# Patient Record
Sex: FEMALE | Race: BLACK OR AFRICAN AMERICAN | Employment: UNEMPLOYED | URBAN - METROPOLITAN AREA
[De-identification: names, ages, dates, MRNs, and addresses within clinical notes are randomized per-mention and may not be internally consistent; named-entity substitution may affect disease eponyms.]

---

## 2017-09-15 ENCOUNTER — HOSPITAL ENCOUNTER (OUTPATIENT)
Dept: MRI IMAGING | Age: 62
Discharge: HOME | End: 2017-09-15
Attending: ORTHOPAEDIC SURGERY
Payer: COMMERCIAL

## 2017-09-15 DIAGNOSIS — M54.50 LOWER BACK PAIN: ICD-10-CM

## 2017-09-15 LAB — CREAT UR-MCNC: 0.8 MG/DL (ref 0.6–1.3)

## 2017-09-15 PROCEDURE — 82565 ASSAY OF CREATININE: CPT

## 2017-09-15 PROCEDURE — 74011250636 HC RX REV CODE- 250/636: Performed by: ORTHOPAEDIC SURGERY

## 2017-09-15 PROCEDURE — A9585 GADOBUTROL INJECTION: HCPCS | Performed by: ORTHOPAEDIC SURGERY

## 2017-09-15 PROCEDURE — 72158 MRI LUMBAR SPINE W/O & W/DYE: CPT

## 2017-09-15 RX ADMIN — GADOBUTROL 7.5 ML: 604.72 INJECTION INTRAVENOUS at 11:46

## 2017-11-15 PROBLEM — M48.061 SPINAL STENOSIS, LUMBAR: Status: ACTIVE | Noted: 2017-11-15

## 2017-11-15 PROBLEM — Z98.890 STATUS POST LUMBAR SURGERY: Status: ACTIVE | Noted: 2017-11-15

## 2017-11-15 PROBLEM — M51.26 HNP (HERNIATED NUCLEUS PULPOSUS), LUMBAR: Status: ACTIVE | Noted: 2017-11-15

## 2017-11-15 RX ORDER — CYCLOBENZAPRINE HCL 10 MG
10 TABLET ORAL
Status: CANCELLED | OUTPATIENT
Start: 2017-11-15

## 2017-11-16 ENCOUNTER — HOSPITAL ENCOUNTER (OUTPATIENT)
Dept: PREADMISSION TESTING | Age: 62
Discharge: HOME | End: 2017-11-16
Payer: COMMERCIAL

## 2017-11-16 VITALS — HEIGHT: 64 IN | BODY MASS INDEX: 42.85 KG/M2 | WEIGHT: 251 LBS

## 2017-11-16 LAB
ANION GAP SERPL CALC-SCNC: 10 MMOL/L (ref 3–18)
ATRIAL RATE: 86 BPM
BACTERIA SPEC CULT: NORMAL
BASOPHILS # BLD: 0 K/UL (ref 0–0.06)
BASOPHILS NFR BLD: 0 % (ref 0–2)
BUN SERPL-MCNC: 19 MG/DL (ref 7–18)
BUN/CREAT SERPL: 21 (ref 12–20)
CALCIUM SERPL-MCNC: 9.6 MG/DL (ref 8.5–10.1)
CALCULATED P AXIS, ECG09: 59 DEGREES
CALCULATED R AXIS, ECG10: 5 DEGREES
CALCULATED T AXIS, ECG11: 98 DEGREES
CHLORIDE SERPL-SCNC: 103 MMOL/L (ref 100–108)
CO2 SERPL-SCNC: 28 MMOL/L (ref 21–32)
CREAT SERPL-MCNC: 0.9 MG/DL (ref 0.6–1.3)
DIAGNOSIS, 93000: NORMAL
DIFFERENTIAL METHOD BLD: ABNORMAL
EOSINOPHIL # BLD: 0.2 K/UL (ref 0–0.4)
EOSINOPHIL NFR BLD: 3 % (ref 0–5)
ERYTHROCYTE [DISTWIDTH] IN BLOOD BY AUTOMATED COUNT: 15 % (ref 11.6–14.5)
GLUCOSE SERPL-MCNC: 91 MG/DL (ref 74–99)
HCT VFR BLD AUTO: 41.7 % (ref 35–45)
HGB BLD-MCNC: 13.4 G/DL (ref 12–16)
LYMPHOCYTES # BLD: 2.4 K/UL (ref 0.9–3.6)
LYMPHOCYTES NFR BLD: 29 % (ref 21–52)
MCH RBC QN AUTO: 27.3 PG (ref 24–34)
MCHC RBC AUTO-ENTMCNC: 32.1 G/DL (ref 31–37)
MCV RBC AUTO: 84.9 FL (ref 74–97)
MONOCYTES # BLD: 0.7 K/UL (ref 0.05–1.2)
MONOCYTES NFR BLD: 9 % (ref 3–10)
NEUTS SEG # BLD: 4.9 K/UL (ref 1.8–8)
NEUTS SEG NFR BLD: 59 % (ref 40–73)
P-R INTERVAL, ECG05: 150 MS
PLATELET # BLD AUTO: 287 K/UL (ref 135–420)
PMV BLD AUTO: 11.1 FL (ref 9.2–11.8)
POTASSIUM SERPL-SCNC: 4 MMOL/L (ref 3.5–5.5)
Q-T INTERVAL, ECG07: 376 MS
QRS DURATION, ECG06: 86 MS
QTC CALCULATION (BEZET), ECG08: 449 MS
RBC # BLD AUTO: 4.91 M/UL (ref 4.2–5.3)
SERVICE CMNT-IMP: NORMAL
SODIUM SERPL-SCNC: 141 MMOL/L (ref 136–145)
VENTRICULAR RATE, ECG03: 86 BPM
WBC # BLD AUTO: 8.2 K/UL (ref 4.6–13.2)

## 2017-11-16 PROCEDURE — 85025 COMPLETE CBC W/AUTO DIFF WBC: CPT | Performed by: ORTHOPAEDIC SURGERY

## 2017-11-16 PROCEDURE — 80048 BASIC METABOLIC PNL TOTAL CA: CPT | Performed by: ORTHOPAEDIC SURGERY

## 2017-11-16 PROCEDURE — 93005 ELECTROCARDIOGRAM TRACING: CPT

## 2017-11-16 PROCEDURE — 87641 MR-STAPH DNA AMP PROBE: CPT | Performed by: ORTHOPAEDIC SURGERY

## 2017-11-16 RX ORDER — ACETAMINOPHEN 500 MG
500 TABLET ORAL
COMMUNITY
End: 2019-11-08

## 2017-11-16 RX ORDER — SODIUM CHLORIDE, SODIUM LACTATE, POTASSIUM CHLORIDE, CALCIUM CHLORIDE 600; 310; 30; 20 MG/100ML; MG/100ML; MG/100ML; MG/100ML
125 INJECTION, SOLUTION INTRAVENOUS CONTINUOUS
Status: CANCELLED | OUTPATIENT
Start: 2017-11-16

## 2017-11-16 RX ORDER — DICLOFENAC SODIUM 75 MG/1
75 TABLET, DELAYED RELEASE ORAL 2 TIMES DAILY
COMMUNITY
End: 2017-11-29

## 2017-11-16 RX ORDER — LOSARTAN POTASSIUM 100 MG/1
100 TABLET ORAL DAILY
COMMUNITY

## 2017-11-16 RX ORDER — CEFAZOLIN SODIUM 2 G/50ML
2 SOLUTION INTRAVENOUS ONCE
Status: CANCELLED | OUTPATIENT
Start: 2017-11-16 | End: 2017-11-16

## 2017-11-16 NOTE — PERIOP NOTES
No sleep apnea or removable prosthetic devices. Care Fusion kit given to patient with instructions. Reviewed [place] wipes. No family history of MH. Pt meets criteria for special populations.

## 2017-11-20 NOTE — H&P
Patient Name:  Jazmyne Dorado   YOB: 1955      Chief Complaint:  Lower back pain and degenerative disc disease. History of Chief Complaint:  Ms. Dolly Vasquez is a 63 y.o female being seen for lower back pain and degenerative disc disease. She says she has been going to the gym and working on her weight. She says she has a lot of excess-hanging tissue which puts a lot of stress on her back. The patient has had no numbness, no weakness, and no bowel or bladder dysfunction. The epidural caused some pain in the right-sided buttock and hip. She still has pain going down the right leg. She has had difficulty bending and turning. She feels like she is getting worse. She says her back and hips are still giving her some problems and pain. She has sharp pain across the lower back and hips. She has difficulty with bending, turning, and twisting. Standing for any length of time or walking for any length of time causes pain. She is doing water aerobics. She is status post L4 to S1 revision decompression and fusion done 07/23/12. Past Medical/Surgical History:    Disease/Disorder Type Date Side Surgery Date Side Comment   Hypertension              Spinal fusion, lumbar 04/2012  Dr. Moises Bowen [facility]       Extension of lumbar fusion 08/2012  Dr. Moises Bowen at [facility]       Gastric bypass 2003         Arthroscopy shoulder 2011 left      Allergies:  No known allergies. Ingredient Reaction Medication Name Comment   NO KNOWN ALLERGIES        Current Medications:    Medication Directions   diclofenac sodium    AMLODIPINE BESYLATE    LOSARTAN POTASSIUM    Duexis 800 mg-26.6 mg tablet take 1 tablet by oral route 3 times every day   cyclobenzaprine 5 mg tablet take 1 tablet by oral route 3 times every day   cyclobenzaprine 10 mg tablet TAKE ONE TABLET BY MOUTH THREE TIMES DAILY     Social History:      SMOKING  Status Tobacco Type Units Per Day Yrs Used   Former smoker Cigarette       ALCOHOL  Type: Wine.  consumed rarely. Family History:    Disease Detail Family Member Age Cause of Death Comments   Family history of Hypertension   N    Family history of Diabetes mellitus   N      Review of Systems:    Pertinent negatives include fever, fainting and fracture/dislocation. Vitals:  Date BP Pulse Temp (F) Resp. (per min.) Height (Total in.) Weight (lbs.) BMI   02/18/2016 161/102 84   65.00  39.11   03/17/2014     65.00  41.60     Physical Examination:    Heart- RRR  Lungs-CTA elysia  Abdomen- +BS,soft,nontender  Musculoskeletal:  There is tenderness around the right PSIS. The thoracolumbar spine has normal kyphosis, a normal appearance, and no scoliosis. There is full range of motion of the cervical, thoracic, and lumbar spine and of the hips, knees, and ankles. Straight leg raising and crossed straight leg raising tests are negative. Neurological:  There is no weakness in the thoracic, lumbar, or sacral spine or in the lower extremities or hips. Deep tendon reflexes are present and normal bilaterally. Ankle and knee jerks are normal with no clonus. Radiograph Examination:  Review of her lumbar spine x-rays reveals L4 to S1 well-healed fusion. Review of her MRI scan of the lumbar spine [facility] 9/15/17 reveals L4 to S1 fusion with L3-4 spinal stenosis. Impression:  Ms. Juliette Sanchez and I had a long discussion about treatments for her lower back pain, hip pain, and buttock pain including surgical intervention, the risks, and benefits as well as the different surgical approaches and decision making. We also discussed nonsurgical care for this condition including medications, injections, physical therapy, rehabilitation, activity modification, and brace utilization. At this point, having failed conservative care, she would like to proceed with operative intervention. We will plan for extension fusion from L3 to S1. The risks versus the benefits as well as the alternatives were fully explained to the patient.   Risks include, but are not limited to, paralysis, death, heart attack, stroke, pulmonary embolism, deep vein thrombosis, infection, failure to relieve pain, increase in back or leg pain, reherniation of disc material, need for revision surgery, scarring, spinal fluid leak, bowel or bladder dysfunction, disease transmission, chronic graft site pain, instrumentation failure, pseudarthrosis, and the need for chronic ambulatory assist devices. The patient states full understanding of the risks and benefits and wishes to proceed.

## 2017-11-29 ENCOUNTER — APPOINTMENT (OUTPATIENT)
Dept: GENERAL RADIOLOGY | Age: 62
DRG: 460 | End: 2017-11-29
Attending: ORTHOPAEDIC SURGERY
Payer: COMMERCIAL

## 2017-11-29 ENCOUNTER — HOSPITAL ENCOUNTER (INPATIENT)
Age: 62
LOS: 2 days | Discharge: HOME HEALTH | DRG: 460 | End: 2017-12-01
Attending: ORTHOPAEDIC SURGERY | Admitting: ORTHOPAEDIC SURGERY
Payer: COMMERCIAL

## 2017-11-29 ENCOUNTER — ANESTHESIA EVENT (OUTPATIENT)
Dept: SURGERY | Age: 62
DRG: 460 | End: 2017-11-29
Payer: COMMERCIAL

## 2017-11-29 ENCOUNTER — ANESTHESIA (OUTPATIENT)
Dept: SURGERY | Age: 62
DRG: 460 | End: 2017-11-29
Payer: COMMERCIAL

## 2017-11-29 LAB
ABO + RH BLD: NORMAL
BLOOD GROUP ANTIBODIES SERPL: NORMAL
SPECIMEN EXP DATE BLD: NORMAL

## 2017-11-29 PROCEDURE — 77030032490 HC SLV COMPR SCD KNE COVD -B: Performed by: ORTHOPAEDIC SURGERY

## 2017-11-29 PROCEDURE — 76010000133 HC OR TIME 3 TO 3.5 HR: Performed by: ORTHOPAEDIC SURGERY

## 2017-11-29 PROCEDURE — 77030006643: Performed by: ANESTHESIOLOGY

## 2017-11-29 PROCEDURE — 76060000037 HC ANESTHESIA 3 TO 3.5 HR: Performed by: ORTHOPAEDIC SURGERY

## 2017-11-29 PROCEDURE — 77030020255 HC SOL INJ LR 1000ML BG: Performed by: ORTHOPAEDIC SURGERY

## 2017-11-29 PROCEDURE — 77030020782 HC GWN BAIR PAWS FLX 3M -B: Performed by: ORTHOPAEDIC SURGERY

## 2017-11-29 PROCEDURE — 77030018836 HC SOL IRR NACL ICUM -A: Performed by: ORTHOPAEDIC SURGERY

## 2017-11-29 PROCEDURE — 77030003029 HC SUT VCRL J&J -B: Performed by: ORTHOPAEDIC SURGERY

## 2017-11-29 PROCEDURE — 77030011640 HC PAD GRND REM COVD -A: Performed by: ORTHOPAEDIC SURGERY

## 2017-11-29 PROCEDURE — 77030020407 HC IV BLD WRMR ST 3M -A: Performed by: ANESTHESIOLOGY

## 2017-11-29 PROCEDURE — C1776 JOINT DEVICE (IMPLANTABLE): HCPCS | Performed by: ORTHOPAEDIC SURGERY

## 2017-11-29 PROCEDURE — 86900 BLOOD TYPING SEROLOGIC ABO: CPT | Performed by: ANESTHESIOLOGY

## 2017-11-29 PROCEDURE — 36415 COLL VENOUS BLD VENIPUNCTURE: CPT | Performed by: ANESTHESIOLOGY

## 2017-11-29 PROCEDURE — 74011250636 HC RX REV CODE- 250/636: Performed by: ORTHOPAEDIC SURGERY

## 2017-11-29 PROCEDURE — 77030034849: Performed by: ORTHOPAEDIC SURGERY

## 2017-11-29 PROCEDURE — 74011000258 HC RX REV CODE- 258: Performed by: PHYSICIAN ASSISTANT

## 2017-11-29 PROCEDURE — 77030003028 HC SUT VCRL J&J -A: Performed by: ORTHOPAEDIC SURGERY

## 2017-11-29 PROCEDURE — 0SG10J1 FUSION OF 2 OR MORE LUMBAR VERTEBRAL JOINTS WITH SYNTHETIC SUBSTITUTE, POSTERIOR APPROACH, POSTERIOR COLUMN, OPEN APPROACH: ICD-10-PCS | Performed by: ORTHOPAEDIC SURGERY

## 2017-11-29 PROCEDURE — C1713 ANCHOR/SCREW BN/BN,TIS/BN: HCPCS | Performed by: ORTHOPAEDIC SURGERY

## 2017-11-29 PROCEDURE — 74011250636 HC RX REV CODE- 250/636: Performed by: ANESTHESIOLOGY

## 2017-11-29 PROCEDURE — 74011250637 HC RX REV CODE- 250/637: Performed by: ANESTHESIOLOGY

## 2017-11-29 PROCEDURE — 77030008683 HC TU ET CUF COVD -A: Performed by: ANESTHESIOLOGY

## 2017-11-29 PROCEDURE — 77030008477 HC STYL SATN SLP COVD -A: Performed by: ANESTHESIOLOGY

## 2017-11-29 PROCEDURE — 77030012406 HC DRN WND PENRS BARD -A: Performed by: ORTHOPAEDIC SURGERY

## 2017-11-29 PROCEDURE — 0SG30J1 FUSION OF LUMBOSACRAL JOINT WITH SYNTHETIC SUBSTITUTE, POSTERIOR APPROACH, POSTERIOR COLUMN, OPEN APPROACH: ICD-10-PCS | Performed by: ORTHOPAEDIC SURGERY

## 2017-11-29 PROCEDURE — 74011250636 HC RX REV CODE- 250/636

## 2017-11-29 PROCEDURE — 77030004402 HC BUR NEUR STRY -C: Performed by: ORTHOPAEDIC SURGERY

## 2017-11-29 PROCEDURE — 77030013708 HC HNDPC SUC IRR PULS STRY –B: Performed by: ORTHOPAEDIC SURGERY

## 2017-11-29 PROCEDURE — 74011000250 HC RX REV CODE- 250: Performed by: ORTHOPAEDIC SURGERY

## 2017-11-29 PROCEDURE — 77030013079 HC BLNKT BAIR HGGR 3M -A: Performed by: ANESTHESIOLOGY

## 2017-11-29 PROCEDURE — 77030008462 HC STPLR SKN PROX J&J -A: Performed by: ORTHOPAEDIC SURGERY

## 2017-11-29 PROCEDURE — 76210000016 HC OR PH I REC 1 TO 1.5 HR: Performed by: ORTHOPAEDIC SURGERY

## 2017-11-29 PROCEDURE — 74011000250 HC RX REV CODE- 250: Performed by: PHYSICIAN ASSISTANT

## 2017-11-29 PROCEDURE — 77030020262 HC SOL INJ SOD CL 0.9% 100ML: Performed by: ORTHOPAEDIC SURGERY

## 2017-11-29 PROCEDURE — 65270000029 HC RM PRIVATE

## 2017-11-29 PROCEDURE — 74011000250 HC RX REV CODE- 250

## 2017-11-29 DEVICE — IMPLANTABLE DEVICE: Type: IMPLANTABLE DEVICE | Site: SPINE LUMBAR | Status: FUNCTIONAL

## 2017-11-29 DEVICE — ENDCAP SPNL PEDCL THORLUM TI LOK FOR 3D HD CLICK: Type: IMPLANTABLE DEVICE | Site: SPINE LUMBAR | Status: FUNCTIONAL

## 2017-11-29 RX ORDER — OXYCODONE AND ACETAMINOPHEN 10; 325 MG/1; MG/1
1 TABLET ORAL
Status: DISCONTINUED | OUTPATIENT
Start: 2017-11-29 | End: 2017-11-30

## 2017-11-29 RX ORDER — DEXAMETHASONE SODIUM PHOSPHATE 4 MG/ML
INJECTION, SOLUTION INTRA-ARTICULAR; INTRALESIONAL; INTRAMUSCULAR; INTRAVENOUS; SOFT TISSUE AS NEEDED
Status: DISCONTINUED | OUTPATIENT
Start: 2017-11-29 | End: 2017-11-29 | Stop reason: HOSPADM

## 2017-11-29 RX ORDER — DIPHENHYDRAMINE HCL 25 MG
25 CAPSULE ORAL
Status: DISCONTINUED | OUTPATIENT
Start: 2017-11-29 | End: 2017-12-01 | Stop reason: HOSPADM

## 2017-11-29 RX ORDER — OXYCODONE AND ACETAMINOPHEN 10; 325 MG/1; MG/1
1-2 TABLET ORAL
Status: DISCONTINUED | OUTPATIENT
Start: 2017-11-29 | End: 2017-11-30

## 2017-11-29 RX ORDER — ZOLPIDEM TARTRATE 5 MG/1
5 TABLET ORAL
Status: DISCONTINUED | OUTPATIENT
Start: 2017-11-29 | End: 2017-12-01 | Stop reason: HOSPADM

## 2017-11-29 RX ORDER — ACETAMINOPHEN 325 MG/1
650 TABLET ORAL
Status: DISCONTINUED | OUTPATIENT
Start: 2017-11-29 | End: 2017-12-01 | Stop reason: HOSPADM

## 2017-11-29 RX ORDER — LIDOCAINE HYDROCHLORIDE 20 MG/ML
INJECTION, SOLUTION EPIDURAL; INFILTRATION; INTRACAUDAL; PERINEURAL AS NEEDED
Status: DISCONTINUED | OUTPATIENT
Start: 2017-11-29 | End: 2017-11-29 | Stop reason: HOSPADM

## 2017-11-29 RX ORDER — SODIUM CHLORIDE, SODIUM LACTATE, POTASSIUM CHLORIDE, CALCIUM CHLORIDE 600; 310; 30; 20 MG/100ML; MG/100ML; MG/100ML; MG/100ML
150 INJECTION, SOLUTION INTRAVENOUS CONTINUOUS
Status: DISCONTINUED | OUTPATIENT
Start: 2017-11-29 | End: 2017-11-29 | Stop reason: HOSPADM

## 2017-11-29 RX ORDER — PREGABALIN 100 MG/1
100 CAPSULE ORAL ONCE
Status: COMPLETED | OUTPATIENT
Start: 2017-11-29 | End: 2017-11-29

## 2017-11-29 RX ORDER — DEXTROSE 50 % IN WATER (D50W) INTRAVENOUS SYRINGE
25-50 AS NEEDED
Status: DISCONTINUED | OUTPATIENT
Start: 2017-11-29 | End: 2017-11-29 | Stop reason: HOSPADM

## 2017-11-29 RX ORDER — DOCUSATE SODIUM 100 MG/1
100 CAPSULE, LIQUID FILLED ORAL 2 TIMES DAILY
Status: DISCONTINUED | OUTPATIENT
Start: 2017-11-30 | End: 2017-12-01 | Stop reason: HOSPADM

## 2017-11-29 RX ORDER — ONDANSETRON 2 MG/ML
4 INJECTION INTRAMUSCULAR; INTRAVENOUS ONCE
Status: DISCONTINUED | OUTPATIENT
Start: 2017-11-29 | End: 2017-11-29 | Stop reason: HOSPADM

## 2017-11-29 RX ORDER — LOSARTAN POTASSIUM 50 MG/1
100 TABLET ORAL DAILY
Status: DISCONTINUED | OUTPATIENT
Start: 2017-11-30 | End: 2017-12-01 | Stop reason: HOSPADM

## 2017-11-29 RX ORDER — SODIUM CHLORIDE 0.9 % (FLUSH) 0.9 %
5-10 SYRINGE (ML) INJECTION AS NEEDED
Status: DISCONTINUED | OUTPATIENT
Start: 2017-11-29 | End: 2017-12-01 | Stop reason: HOSPADM

## 2017-11-29 RX ORDER — CALCIUM CARBONATE 200(500)MG
2 TABLET,CHEWABLE ORAL AS NEEDED
COMMUNITY

## 2017-11-29 RX ORDER — SODIUM CHLORIDE, SODIUM LACTATE, POTASSIUM CHLORIDE, CALCIUM CHLORIDE 600; 310; 30; 20 MG/100ML; MG/100ML; MG/100ML; MG/100ML
125 INJECTION, SOLUTION INTRAVENOUS CONTINUOUS
Status: DISCONTINUED | OUTPATIENT
Start: 2017-11-29 | End: 2017-12-01

## 2017-11-29 RX ORDER — INSULIN LISPRO 100 [IU]/ML
INJECTION, SOLUTION INTRAVENOUS; SUBCUTANEOUS ONCE
Status: DISCONTINUED | OUTPATIENT
Start: 2017-11-29 | End: 2017-11-29 | Stop reason: HOSPADM

## 2017-11-29 RX ORDER — SODIUM CHLORIDE, SODIUM LACTATE, POTASSIUM CHLORIDE, CALCIUM CHLORIDE 600; 310; 30; 20 MG/100ML; MG/100ML; MG/100ML; MG/100ML
125 INJECTION, SOLUTION INTRAVENOUS CONTINUOUS
Status: DISCONTINUED | OUTPATIENT
Start: 2017-11-30 | End: 2017-12-01

## 2017-11-29 RX ORDER — DIPHENHYDRAMINE HYDROCHLORIDE 50 MG/ML
12.5 INJECTION, SOLUTION INTRAMUSCULAR; INTRAVENOUS
Status: DISCONTINUED | OUTPATIENT
Start: 2017-11-29 | End: 2017-11-29 | Stop reason: HOSPADM

## 2017-11-29 RX ORDER — HYDROMORPHONE HYDROCHLORIDE 2 MG/ML
0.5 INJECTION, SOLUTION INTRAMUSCULAR; INTRAVENOUS; SUBCUTANEOUS
Status: DISCONTINUED | OUTPATIENT
Start: 2017-11-29 | End: 2017-11-29 | Stop reason: HOSPADM

## 2017-11-29 RX ORDER — SODIUM CHLORIDE 0.9 % (FLUSH) 0.9 %
5-10 SYRINGE (ML) INJECTION EVERY 8 HOURS
Status: DISCONTINUED | OUTPATIENT
Start: 2017-11-30 | End: 2017-12-01 | Stop reason: HOSPADM

## 2017-11-29 RX ORDER — CEFAZOLIN SODIUM 2 G/50ML
2 SOLUTION INTRAVENOUS EVERY 8 HOURS
Status: COMPLETED | OUTPATIENT
Start: 2017-11-30 | End: 2017-11-30

## 2017-11-29 RX ORDER — OXYCODONE AND ACETAMINOPHEN 5; 325 MG/1; MG/1
1 TABLET ORAL
Status: DISCONTINUED | OUTPATIENT
Start: 2017-11-29 | End: 2017-11-30

## 2017-11-29 RX ORDER — ACETAMINOPHEN 10 MG/ML
1000 INJECTION, SOLUTION INTRAVENOUS ONCE
Status: COMPLETED | OUTPATIENT
Start: 2017-11-29 | End: 2017-11-29

## 2017-11-29 RX ORDER — MIDAZOLAM HYDROCHLORIDE 1 MG/ML
INJECTION, SOLUTION INTRAMUSCULAR; INTRAVENOUS AS NEEDED
Status: DISCONTINUED | OUTPATIENT
Start: 2017-11-29 | End: 2017-11-29 | Stop reason: HOSPADM

## 2017-11-29 RX ORDER — ALBUTEROL SULFATE 0.83 MG/ML
2.5 SOLUTION RESPIRATORY (INHALATION) AS NEEDED
Status: DISCONTINUED | OUTPATIENT
Start: 2017-11-29 | End: 2017-11-29 | Stop reason: HOSPADM

## 2017-11-29 RX ORDER — LUBIPROSTONE 8 UG/1
24 CAPSULE, GELATIN COATED ORAL 2 TIMES DAILY WITH MEALS
Status: DISCONTINUED | OUTPATIENT
Start: 2017-11-30 | End: 2017-12-01 | Stop reason: HOSPADM

## 2017-11-29 RX ORDER — PROPOFOL 10 MG/ML
INJECTION, EMULSION INTRAVENOUS AS NEEDED
Status: DISCONTINUED | OUTPATIENT
Start: 2017-11-29 | End: 2017-11-29 | Stop reason: HOSPADM

## 2017-11-29 RX ORDER — AMLODIPINE BESYLATE 5 MG/1
10 TABLET ORAL DAILY
Status: DISCONTINUED | OUTPATIENT
Start: 2017-11-30 | End: 2017-12-01 | Stop reason: HOSPADM

## 2017-11-29 RX ORDER — MAGNESIUM SULFATE 100 %
4 CRYSTALS MISCELLANEOUS AS NEEDED
Status: DISCONTINUED | OUTPATIENT
Start: 2017-11-29 | End: 2017-11-29 | Stop reason: HOSPADM

## 2017-11-29 RX ORDER — DIAZEPAM 5 MG/1
5 TABLET ORAL
Status: DISCONTINUED | OUTPATIENT
Start: 2017-11-29 | End: 2017-11-30

## 2017-11-29 RX ORDER — NALOXONE HYDROCHLORIDE 0.4 MG/ML
0.1 INJECTION, SOLUTION INTRAMUSCULAR; INTRAVENOUS; SUBCUTANEOUS AS NEEDED
Status: DISCONTINUED | OUTPATIENT
Start: 2017-11-29 | End: 2017-12-01 | Stop reason: HOSPADM

## 2017-11-29 RX ORDER — ONDANSETRON 2 MG/ML
INJECTION INTRAMUSCULAR; INTRAVENOUS AS NEEDED
Status: DISCONTINUED | OUTPATIENT
Start: 2017-11-29 | End: 2017-11-29 | Stop reason: HOSPADM

## 2017-11-29 RX ORDER — CEFAZOLIN SODIUM 2 G/50ML
2 SOLUTION INTRAVENOUS ONCE
Status: COMPLETED | OUTPATIENT
Start: 2017-11-29 | End: 2017-11-29

## 2017-11-29 RX ORDER — NALOXONE HYDROCHLORIDE 0.4 MG/ML
0.1 INJECTION, SOLUTION INTRAMUSCULAR; INTRAVENOUS; SUBCUTANEOUS AS NEEDED
Status: DISCONTINUED | OUTPATIENT
Start: 2017-11-29 | End: 2017-11-29 | Stop reason: HOSPADM

## 2017-11-29 RX ORDER — OXYCODONE HYDROCHLORIDE 5 MG/1
5 TABLET ORAL ONCE
Status: COMPLETED | OUTPATIENT
Start: 2017-11-29 | End: 2017-11-29

## 2017-11-29 RX ORDER — SODIUM CHLORIDE 0.9 % (FLUSH) 0.9 %
5-10 SYRINGE (ML) INJECTION AS NEEDED
Status: DISCONTINUED | OUTPATIENT
Start: 2017-11-29 | End: 2017-11-29 | Stop reason: HOSPADM

## 2017-11-29 RX ORDER — FENTANYL CITRATE 50 UG/ML
INJECTION, SOLUTION INTRAMUSCULAR; INTRAVENOUS AS NEEDED
Status: DISCONTINUED | OUTPATIENT
Start: 2017-11-29 | End: 2017-11-29 | Stop reason: HOSPADM

## 2017-11-29 RX ORDER — ROCURONIUM BROMIDE 10 MG/ML
INJECTION, SOLUTION INTRAVENOUS AS NEEDED
Status: DISCONTINUED | OUTPATIENT
Start: 2017-11-29 | End: 2017-11-29 | Stop reason: HOSPADM

## 2017-11-29 RX ORDER — HYDROCODONE BITARTRATE AND ACETAMINOPHEN 5; 325 MG/1; MG/1
1 TABLET ORAL AS NEEDED
Status: DISCONTINUED | OUTPATIENT
Start: 2017-11-29 | End: 2017-11-29 | Stop reason: HOSPADM

## 2017-11-29 RX ORDER — ONDANSETRON 4 MG/1
4 TABLET, ORALLY DISINTEGRATING ORAL
Status: DISCONTINUED | OUTPATIENT
Start: 2017-11-29 | End: 2017-12-01 | Stop reason: HOSPADM

## 2017-11-29 RX ADMIN — FENTANYL CITRATE 50 MCG: 50 INJECTION, SOLUTION INTRAMUSCULAR; INTRAVENOUS at 20:52

## 2017-11-29 RX ADMIN — FENTANYL CITRATE 50 MCG: 50 INJECTION, SOLUTION INTRAMUSCULAR; INTRAVENOUS at 21:11

## 2017-11-29 RX ADMIN — CEFAZOLIN SODIUM 2 G: 2 SOLUTION INTRAVENOUS at 18:12

## 2017-11-29 RX ADMIN — SODIUM CHLORIDE 1 G: 900 INJECTION, SOLUTION INTRAVENOUS at 18:46

## 2017-11-29 RX ADMIN — ROCURONIUM BROMIDE 20 MG: 10 INJECTION, SOLUTION INTRAVENOUS at 19:03

## 2017-11-29 RX ADMIN — FENTANYL CITRATE 50 MCG: 50 INJECTION, SOLUTION INTRAMUSCULAR; INTRAVENOUS at 21:15

## 2017-11-29 RX ADMIN — ROCURONIUM BROMIDE 50 MG: 10 INJECTION, SOLUTION INTRAVENOUS at 18:08

## 2017-11-29 RX ADMIN — FENTANYL CITRATE 25 MCG: 50 INJECTION, SOLUTION INTRAMUSCULAR; INTRAVENOUS at 20:06

## 2017-11-29 RX ADMIN — OXYCODONE HYDROCHLORIDE 5 MG: 5 TABLET ORAL at 15:29

## 2017-11-29 RX ADMIN — SODIUM CHLORIDE, SODIUM LACTATE, POTASSIUM CHLORIDE, AND CALCIUM CHLORIDE 125 ML/HR: 600; 310; 30; 20 INJECTION, SOLUTION INTRAVENOUS at 14:03

## 2017-11-29 RX ADMIN — ACETAMINOPHEN 1000 MG: 10 INJECTION, SOLUTION INTRAVENOUS at 21:58

## 2017-11-29 RX ADMIN — FENTANYL CITRATE 100 MCG: 50 INJECTION, SOLUTION INTRAMUSCULAR; INTRAVENOUS at 18:15

## 2017-11-29 RX ADMIN — LIDOCAINE HYDROCHLORIDE 60 MG: 20 INJECTION, SOLUTION EPIDURAL; INFILTRATION; INTRACAUDAL; PERINEURAL at 18:06

## 2017-11-29 RX ADMIN — FENTANYL CITRATE 100 MCG: 50 INJECTION, SOLUTION INTRAMUSCULAR; INTRAVENOUS at 18:05

## 2017-11-29 RX ADMIN — SODIUM CHLORIDE, SODIUM LACTATE, POTASSIUM CHLORIDE, AND CALCIUM CHLORIDE: 600; 310; 30; 20 INJECTION, SOLUTION INTRAVENOUS at 20:21

## 2017-11-29 RX ADMIN — FENTANYL CITRATE 25 MCG: 50 INJECTION, SOLUTION INTRAMUSCULAR; INTRAVENOUS at 19:15

## 2017-11-29 RX ADMIN — DEXAMETHASONE SODIUM PHOSPHATE 4 MG: 4 INJECTION, SOLUTION INTRA-ARTICULAR; INTRALESIONAL; INTRAMUSCULAR; INTRAVENOUS; SOFT TISSUE at 18:59

## 2017-11-29 RX ADMIN — FENTANYL CITRATE 50 MCG: 50 INJECTION, SOLUTION INTRAMUSCULAR; INTRAVENOUS at 20:38

## 2017-11-29 RX ADMIN — HYDROMORPHONE HYDROCHLORIDE: 10 INJECTION, SOLUTION INTRAMUSCULAR; INTRAVENOUS; SUBCUTANEOUS at 22:21

## 2017-11-29 RX ADMIN — PREGABALIN 100 MG: 100 CAPSULE ORAL at 15:29

## 2017-11-29 RX ADMIN — ONDANSETRON 4 MG: 2 INJECTION INTRAMUSCULAR; INTRAVENOUS at 19:01

## 2017-11-29 RX ADMIN — FENTANYL CITRATE 50 MCG: 50 INJECTION, SOLUTION INTRAMUSCULAR; INTRAVENOUS at 20:50

## 2017-11-29 RX ADMIN — SODIUM CHLORIDE, SODIUM LACTATE, POTASSIUM CHLORIDE, AND CALCIUM CHLORIDE 125 ML/HR: 600; 310; 30; 20 INJECTION, SOLUTION INTRAVENOUS at 21:39

## 2017-11-29 RX ADMIN — MIDAZOLAM HYDROCHLORIDE 2 MG: 1 INJECTION, SOLUTION INTRAMUSCULAR; INTRAVENOUS at 18:03

## 2017-11-29 RX ADMIN — SODIUM CHLORIDE, SODIUM LACTATE, POTASSIUM CHLORIDE, AND CALCIUM CHLORIDE: 600; 310; 30; 20 INJECTION, SOLUTION INTRAVENOUS at 18:30

## 2017-11-29 RX ADMIN — FENTANYL CITRATE 50 MCG: 50 INJECTION, SOLUTION INTRAMUSCULAR; INTRAVENOUS at 20:12

## 2017-11-29 RX ADMIN — PROPOFOL 160 MG: 10 INJECTION, EMULSION INTRAVENOUS at 18:06

## 2017-11-29 NOTE — INTERVAL H&P NOTE
H&P Update:  Elaine Bob was seen and examined. History and physical has been reviewed. The patient has been examined.  There have been no significant clinical changes since the completion of the originally dated History and Physical.    Signed By: Joe Schwartz MD     November 29, 2017 3:43 PM

## 2017-11-29 NOTE — IP AVS SNAPSHOT
Valerie Davison 
 
 
 [address] [address] 
[phone] Patient: Lisa Aranda MRN: [id number] SFR:4/55/9936 About your hospitalization You were admitted on:  November 29, 2017 You last received care in the:  [facility] [address] You were discharged on:  December 1, 2017 Why you were hospitalized Your primary diagnosis was:  Spinal Stenosis, Lumbar Your diagnoses also included:  Hnp (Herniated Nucleus Pulposus), Lumbar, Status Post Lumbar Surgery Things You Need To Do (next 8 weeks) Follow up with Simone Tao MD  
  
Phone:  [phone] Where:  [address], [address] [address] Follow up with [facility] Chosen to continue managing your healthcare needs. Where:  [phone] Tuesday Dec 12, 2017 Follow up with Javier Rajan MD  
Follow up appointment @ 10:45am  
  
Phone:  [phone] Where:  [address], Orthopedic and [address] [place] [address], [address] [place] [address] Drive [address] Discharge Orders None A check sarabjit indicates which time of day the medication should be taken. My Medications STOP taking these medications BC HEADACHE POWDER PO  
   
  
 DICLOFENAC SODIUM PO  
   
  
  
TAKE these medications as instructed Instructions Each Dose to Equal  
 Morning Noon Evening Bedtime  
 amLODIPine 10 mg tablet Commonly known as:  [place] Your last dose was: Your next dose is: Take 10 mg by mouth daily. Indications: hypertension 10 mg  
    
   
   
   
  
 calcium carbonate 200 mg calcium (500 mg) Chew Commonly known as:  TUMS Your last dose was: Your next dose is: Take 2 Tabs by mouth daily. 2 Tab  
    
   
   
   
  
 diazePAM 2 mg tablet Commonly known as:  VALIUM Your last dose was: Your next dose is: Take 1 Tab by mouth three (3) times daily as needed. Max Daily Amount: 6 mg.  
 2 mg HYDROcodone-acetaminophen 5-325 mg per tablet Commonly known as:  Omari Khoury Your last dose was: Your next dose is: Take 1-1.5 Tabs by mouth every four (4) hours as needed. Max Daily Amount: 9 Tabs.  
 1-1.5 Tab  
    
   
   
   
  
 losartan 100 mg tablet Commonly known as:  COZAAR Your last dose was: Your next dose is: Take 100 mg by mouth daily. Indications: hypertension 100 mg  
    
   
   
   
  
 multivitamin tablet Commonly known as:  ONE A DAY Your last dose was: Your next dose is: Take 1 Tab by mouth daily. 1 Tab  
    
   
   
   
  
 traMADol 50 mg tablet Commonly known as:  ULTRAM  
   
Your last dose was: Your next dose is: Take 1-2 Tabs by mouth every six (6) hours as needed. Max Daily Amount: 400 mg.  
  mg  
    
   
   
   
  
 TYLENOL EXTRA STRENGTH 500 mg tablet Generic drug:  acetaminophen Your last dose was: Your next dose is: Take 500 mg by mouth every six (6) hours as needed for Pain. Indications: TOOTHACHE  
 500 mg  
    
   
   
   
  
 VITAMIN D3 1,000 unit Cap Generic drug:  cholecalciferol Your last dose was: Your next dose is: Take  by mouth daily. ASK your physician about these medications Instructions Each Dose to Equal  
 Morning Noon Evening Bedtime  
 cyclobenzaprine 10 mg tablet Commonly known as:  FLEXERIL Your last dose was: Your next dose is: Take 1 Tab by mouth three (3) times daily as needed for Muscle Spasm(s). 10 mg Where to Get Your Medications Information on where to get these meds will be given to you by the nurse or doctor. ! Ask your nurse or doctor about these medications diazePAM 2 mg tablet HYDROcodone-acetaminophen 5-325 mg per tablet  
 traMADol 50 mg tablet Discharge Instructions [address] Dr. Kimberly Heller *YOU MUST AVOID SMOKING OR BEING AROUND ANYONE WHO SMOKES. AVOID ALL PRODUCTS THAT CONTAIN NICOTINE. DO NOT TAKE IBUPROFEN OR ANTI--INFLAMMATORIES, AS THESE MAY ALTER THE HEALING OF THE FUSION. ACTIVITIES : 
*The first week after surgery 1. You may be up and walking about the house. 2.  Activities around the house, such as washing dishes, fixing light meals, and your own personal care are fine. 3.  Avoid strenuous activities, such as vacuuming, lifting laundry or grocery bags. 4.  Do not lift anything heavier than 1 gallon of milk (or about 5-8 pounds). 5.  Do not bend over to  items from the ground level until 3 months post-op. *Week 2 and beyond 1. You may gradually increase your activities, but still avoid heavy lifting, pushing/pulling. 2.  Walking is the best way to rebuild strength and stamina. Start SLOWLY and gradually increase the distance a little every week. 3.  Walk at a pace that avoids fatigue or severe pain. Do not try to walk several blocks the first day! As you increase the distance, you may feel tired. If so, stop and rest.  
4.  You should be able to walk several blocks by your first clinic visit. 5.  Follow-up with Dr. Jasmyn Pillai in 10-14 days. BATHING and INCISION CARE: 
1. The incision may be tender to touch or feel numb: this is normal.  
2.  Keep the incision clean and dry. Do not get incision wet for 5 days. The incision will be closed with sutures under the skin and the skin will be glued. 3.  Do not apply any lotions, ointments or oils on the incision. 4.  If you notice any excessive swelling, redness, or persistent drainage around the incision, notify the office immediately. DRIVIN. You should not drive until after your follow-up appointment. 2.  You can be in a vehicle for short distances, but if you travel any long distance, please stop about every 30 minutes and walk/stretch. 3.  You should NEVER drive while taking narcotic medication. RETURN TO WORK : 
1. The decision to return to work will be determined on an individual basis. 2.  Many people who have a strenuous job (construction, heavy labor, etc) may need to be off work for up to 12 weeks. 3.  If you need a work note, please let us know as soon as possible, and not the same day you are planning to return to work. NUTRITION : 
1.  Good nutrition is an essential part of healing. 2.  You should eat a balanced diet each day, including fruits, vegetables, dairy products and protein. 3.  Remember to drink plenty of water. 4.  If you have not had a bowel movement within 3 days of surgery, you will need to use a laxative or suppository that can be obtained over-the-counter at your local pharmacy. BONE STIMULATOR: 
1. A spinal bone stimulator may be prescribed for you under certain situations. 2.  A nurse will call you if one has been requested and discuss its use for approximately 4-6 months post-op every day. 3.  This device assists in bone healing and fusion. MEDICATIONS - 
1. You may resume the medications you were taking before surgery, with the general exception of (or DO NOT TAKE) non-steroidal anti-inflammatory medications, such as Motrin, Aleve, Advil Naprosyn, Ibuprofen or aspirin. 2.  You will receive a prescription for pain medication at discharge from the hospital. The pain medication works best if taken before the pain becomes severe. 3.  To reduce stomach upset, always take the medication with food. 4.  Begin to wean yourself off the pain medication during the second week after discharge. 5.  If you need a refill, please call the office during working hours at least 2 days before your prescription runs out.  Do not wait until your bottle is empty to call for a refill. 6.  DO NOT drive if you are taking narcotic pain medications. HOME HEALTH CARE: 
1.   A home health care service has been set-up for you to help assist you once you leave the hospital. 
2.  They will contact you either before you leave the hospital or within 24 hours once you have been discharged home. 3. A nurse will assist you with your dressing changes and a Physical Therapist with help you with your therapy needs. CALL THE OFFICE: 
? If you have severe pain unrelieved by the medications, new numbness or tingling in your legs; 
? If you have a fever of 101.0°F or greater;  
? If you notice excessive swelling, redness, or persistent drainage from the incision or IV site; [facility] office number is [phone] from 8:00am to 5:00pm Monday through Friday. After 5:00pm, on weekends, or holidays, please leave a message with our answering service and the doctor on-call will get back to you shortly. [employer] Announcement We are excited to announce that we are making your provider's discharge notes available to you in [employer]. You will see these notes when they are completed and signed by the physician that discharged you from your recent hospital stay. If you have any questions or concerns about any information you see in [employer], please call the Health Information Department where you were seen or reach out to your Primary Care Provider for more information about your plan of care. Introducing [facility]! Alvaro Lu introduces [employer] patient portal. Now you can access parts of your medical record, email your doctor's office, and request medication refills online. 1. In your internet browser, go to [email or web address] 2. Click on the First Time User? Click Here link in the Sign In box. You will see the New Member Sign Up page. 3. Enter your Jimdo Access Code exactly as it appears below. You will not need to use this code after youve completed the sign-up process. If you do not sign up before the expiration date, you must request a new code. · Jimdo Access Code: [email or web address] Expires: 12/6/2017 12:37 PM 
 
4. Enter the last four digits of your Social Security Number (xxxx) and Date of Birth (mm/dd/yyyy) as indicated and click Submit. You will be taken to the next sign-up page. 5. Create a Jimdo ID. This will be your Jimdo login ID and cannot be changed, so think of one that is secure and easy to remember. 6. Create a Jimdo password. You can change your password at any time. 7. Enter your Password Reset Question and Answer. This can be used at a later time if you forget your password. 8. Enter your e-mail address. You will receive e-mail notification when new information is available in [address] 9. Click Sign Up. You can now view and download portions of your medical record. 10. Click the Download Summary menu link to download a portable copy of your medical information. If you have questions, please visit the Frequently Asked Questions section of the Jimdo website. Remember, [employer] is NOT to be used for urgent needs. For medical emergencies, dial 911. Now available from your iPhone and Android! Providers Seen During Your Hospitalization Provider Specialty Primary office phone Andrey Lynn, [address] [facility] Orthopedic Surgery [phone] Your Primary Care Physician (PCP) Primary Care Physician Office Phone Office Fax Cynthia Noguera [phone] You are allergic to the following No active allergies Recent Documentation Height Weight Breastfeeding? BMI OB Status Smoking Status 1.613 m 118.5 kg No 45.55 kg/m2 Postmenopausal Never Smoker Emergency Contacts Name Discharge Info Relation Home Work Mobile [facility] DISCHARGE CAREGIVER [3] Son [22]   [phone] Patient Belongings The following personal items are in your possession at time of discharge: 
  Dental Appliances: None  Visual Aid: None      Home Medications: None   Jewelry: None  Clothing: None    Other Valuables: Cell Phone  Personal Items Sent to Safe:  (none) Please provide this summary of care documentation to your next provider. Signatures-by signing, you are acknowledging that this After Visit Summary has been reviewed with you and you have received a copy. Patient Signature:  ____________________________________________________________ Date:  ____________________________________________________________  
  
Josph Perfect Provider Signature:  ____________________________________________________________ Date:  ____________________________________________________________

## 2017-11-29 NOTE — IP AVS SNAPSHOT
Sophie Wong 
 
 
 [address] [facility] [address] 
[phone] Patient: Janet Goldberg MRN: [id number] CXO:8/65/0270 My Medications STOP taking these medications BC HEADACHE POWDER PO  
   
  
 DICLOFENAC SODIUM PO  
   
  
  
TAKE these medications as instructed Instructions Each Dose to Equal  
 Morning Noon Evening Bedtime  
 amLODIPine 10 mg tablet Commonly known as:  Adrian Reese Your last dose was: Your next dose is: Take 10 mg by mouth daily. Indications: hypertension 10 mg  
    
   
   
   
  
 calcium carbonate 200 mg calcium (500 mg) Chew Commonly known as:  TUMS Your last dose was: Your next dose is: Take 2 Tabs by mouth daily. 2 Tab  
    
   
   
   
  
 diazePAM 2 mg tablet Commonly known as:  VALIUM Your last dose was: Your next dose is: Take 1 Tab by mouth three (3) times daily as needed. Max Daily Amount: 6 mg.  
 2 mg HYDROcodone-acetaminophen 5-325 mg per tablet Commonly known as:  Omari Khoury Your last dose was: Your next dose is: Take 1-1.5 Tabs by mouth every four (4) hours as needed. Max Daily Amount: 9 Tabs.  
 1-1.5 Tab  
    
   
   
   
  
 losartan 100 mg tablet Commonly known as:  COZAAR Your last dose was: Your next dose is: Take 100 mg by mouth daily. Indications: hypertension 100 mg  
    
   
   
   
  
 multivitamin tablet Commonly known as:  ONE A DAY Your last dose was: Your next dose is: Take 1 Tab by mouth daily. 1 Tab  
    
   
   
   
  
 traMADol 50 mg tablet Commonly known as:  ULTRAM  
   
Your last dose was: Your next dose is: Take 1-2 Tabs by mouth every six (6) hours as needed. Max Daily Amount: 400 mg.  
  mg  
    
   
   
   
  
 TYLENOL EXTRA STRENGTH 500 mg tablet Generic drug:  acetaminophen Your last dose was: Your next dose is: Take 500 mg by mouth every six (6) hours as needed for Pain. Indications: TOOTHACHE  
 500 mg  
    
   
   
   
  
 VITAMIN D3 1,000 unit Cap Generic drug:  cholecalciferol Your last dose was: Your next dose is: Take  by mouth daily. ASK your physician about these medications Instructions Each Dose to Equal  
 Morning Noon Evening Bedtime  
 cyclobenzaprine 10 mg tablet Commonly known as:  FLEXERIL Your last dose was: Your next dose is: Take 1 Tab by mouth three (3) times daily as needed for Muscle Spasm(s). 10 mg Where to Get Your Medications Information on where to get these meds will be given to you by the nurse or doctor. ! Ask your nurse or doctor about these medications  
  diazePAM 2 mg tablet HYDROcodone-acetaminophen 5-325 mg per tablet  
 traMADol 50 mg tablet

## 2017-11-29 NOTE — ANESTHESIA PREPROCEDURE EVALUATION
Anesthetic History   No history of anesthetic complications            Review of Systems / Medical History  Patient summary reviewed and nursing notes reviewed    Pulmonary  Within defined limits                 Neuro/Psych   Within defined limits           Cardiovascular    Hypertension              Exercise tolerance: >4 METS     GI/Hepatic/Renal  Within defined limits              Endo/Other        Morbid obesity and arthritis     Other Findings              Physical Exam    Airway  Mallampati: II  TM Distance: 4 - 6 cm  Neck ROM: normal range of motion   Mouth opening: Normal     Cardiovascular  Regular rate and rhythm,  S1 and S2 normal,  no murmur, click, rub, or gallop             Dental  No notable dental hx       Pulmonary  Breath sounds clear to auscultation               Abdominal  GI exam deferred       Other Findings            Anesthetic Plan    ASA: 3  Anesthesia type: general          Induction: Intravenous  Anesthetic plan and risks discussed with: Patient

## 2017-11-30 ENCOUNTER — HOME HEALTH ADMISSION (OUTPATIENT)
Dept: HOME HEALTH SERVICES | Facility: HOME HEALTH | Age: 62
End: 2017-11-30
Payer: COMMERCIAL

## 2017-11-30 PROBLEM — M51.26 HNP (HERNIATED NUCLEUS PULPOSUS), LUMBAR: Status: RESOLVED | Noted: 2017-11-15 | Resolved: 2017-11-30

## 2017-11-30 PROBLEM — M48.061 SPINAL STENOSIS, LUMBAR: Status: RESOLVED | Noted: 2017-11-15 | Resolved: 2017-11-30

## 2017-11-30 LAB
ERYTHROCYTE [DISTWIDTH] IN BLOOD BY AUTOMATED COUNT: 14.7 % (ref 11.6–14.5)
HCT VFR BLD AUTO: 38.3 % (ref 35–45)
HGB BLD-MCNC: 12.2 G/DL (ref 12–16)
MCH RBC QN AUTO: 27.4 PG (ref 24–34)
MCHC RBC AUTO-ENTMCNC: 31.9 G/DL (ref 31–37)
MCV RBC AUTO: 85.9 FL (ref 74–97)
PLATELET # BLD AUTO: 278 K/UL (ref 135–420)
PMV BLD AUTO: 10.6 FL (ref 9.2–11.8)
RBC # BLD AUTO: 4.46 M/UL (ref 4.2–5.3)
WBC # BLD AUTO: 18 K/UL (ref 4.6–13.2)

## 2017-11-30 PROCEDURE — 97530 THERAPEUTIC ACTIVITIES: CPT

## 2017-11-30 PROCEDURE — 65270000029 HC RM PRIVATE

## 2017-11-30 PROCEDURE — 74011250636 HC RX REV CODE- 250/636: Performed by: ORTHOPAEDIC SURGERY

## 2017-11-30 PROCEDURE — 97161 PT EVAL LOW COMPLEX 20 MIN: CPT

## 2017-11-30 PROCEDURE — 97535 SELF CARE MNGMENT TRAINING: CPT

## 2017-11-30 PROCEDURE — 97165 OT EVAL LOW COMPLEX 30 MIN: CPT

## 2017-11-30 PROCEDURE — 77010033678 HC OXYGEN DAILY

## 2017-11-30 PROCEDURE — 74011250636 HC RX REV CODE- 250/636: Performed by: PHYSICIAN ASSISTANT

## 2017-11-30 PROCEDURE — 97116 GAIT TRAINING THERAPY: CPT

## 2017-11-30 PROCEDURE — 36415 COLL VENOUS BLD VENIPUNCTURE: CPT | Performed by: PHYSICIAN ASSISTANT

## 2017-11-30 PROCEDURE — 74011250637 HC RX REV CODE- 250/637: Performed by: PHYSICIAN ASSISTANT

## 2017-11-30 PROCEDURE — 85027 COMPLETE CBC AUTOMATED: CPT | Performed by: PHYSICIAN ASSISTANT

## 2017-11-30 RX ORDER — DIAZEPAM 2 MG/1
2 TABLET ORAL
Status: DISCONTINUED | OUTPATIENT
Start: 2017-11-30 | End: 2017-12-01 | Stop reason: HOSPADM

## 2017-11-30 RX ORDER — HYDROCODONE BITARTRATE AND ACETAMINOPHEN 5; 325 MG/1; MG/1
1-1.5 TABLET ORAL
Qty: 84 TAB | Refills: 0 | Status: SHIPPED
Start: 2017-11-30 | End: 2019-08-26

## 2017-11-30 RX ORDER — HYDROCODONE BITARTRATE AND ACETAMINOPHEN 5; 325 MG/1; MG/1
1 TABLET ORAL
Status: DISCONTINUED | OUTPATIENT
Start: 2017-11-30 | End: 2017-12-01

## 2017-11-30 RX ORDER — ACETAMINOPHEN 10 MG/ML
1000 INJECTION, SOLUTION INTRAVENOUS
Status: DISCONTINUED | OUTPATIENT
Start: 2017-11-30 | End: 2017-11-30 | Stop reason: ALTCHOICE

## 2017-11-30 RX ORDER — HYDROCODONE BITARTRATE AND ACETAMINOPHEN 5; 325 MG/1; MG/1
1-1.5 TABLET ORAL
Qty: 84 TAB | Refills: 0 | Status: SHIPPED | OUTPATIENT
Start: 2017-11-30 | End: 2017-11-30

## 2017-11-30 RX ORDER — TRAMADOL HYDROCHLORIDE 50 MG/1
50-100 TABLET ORAL
Status: DISCONTINUED | OUTPATIENT
Start: 2017-11-30 | End: 2017-11-30

## 2017-11-30 RX ORDER — TRAMADOL HYDROCHLORIDE 50 MG/1
50-100 TABLET ORAL
Qty: 84 TAB | Refills: 0 | Status: SHIPPED | OUTPATIENT
Start: 2017-11-30 | End: 2019-08-26

## 2017-11-30 RX ORDER — DIAZEPAM 2 MG/1
2 TABLET ORAL
Qty: 60 TAB | Refills: 0 | Status: SHIPPED | OUTPATIENT
Start: 2017-11-30 | End: 2019-08-26

## 2017-11-30 RX ADMIN — DOCUSATE SODIUM 100 MG: 100 CAPSULE ORAL at 08:39

## 2017-11-30 RX ADMIN — Medication 10 ML: at 22:00

## 2017-11-30 RX ADMIN — NALOXONE HYDROCHLORIDE 0.1 MG: 0.4 INJECTION, SOLUTION INTRAMUSCULAR; INTRAVENOUS; SUBCUTANEOUS at 06:16

## 2017-11-30 RX ADMIN — DOCUSATE SODIUM 100 MG: 100 CAPSULE ORAL at 20:50

## 2017-11-30 RX ADMIN — SODIUM CHLORIDE, SODIUM LACTATE, POTASSIUM CHLORIDE, AND CALCIUM CHLORIDE 125 ML/HR: 600; 310; 30; 20 INJECTION, SOLUTION INTRAVENOUS at 13:25

## 2017-11-30 RX ADMIN — DIAZEPAM 2 MG: 2 TABLET ORAL at 23:35

## 2017-11-30 RX ADMIN — NALOXONE HYDROCHLORIDE 0.1 MG: 0.4 INJECTION, SOLUTION INTRAMUSCULAR; INTRAVENOUS; SUBCUTANEOUS at 06:35

## 2017-11-30 RX ADMIN — CEFAZOLIN SODIUM 2 G: 2 SOLUTION INTRAVENOUS at 15:04

## 2017-11-30 RX ADMIN — DIAZEPAM 2 MG: 2 TABLET ORAL at 13:30

## 2017-11-30 RX ADMIN — LUBIPROSTONE 24 MCG: 8 CAPSULE, GELATIN COATED ORAL at 17:01

## 2017-11-30 RX ADMIN — LUBIPROSTONE 24 MCG: 8 CAPSULE, GELATIN COATED ORAL at 08:39

## 2017-11-30 RX ADMIN — LOSARTAN POTASSIUM 100 MG: 50 TABLET ORAL at 08:49

## 2017-11-30 RX ADMIN — NALOXONE HYDROCHLORIDE 0.1 MG: 0.4 INJECTION, SOLUTION INTRAMUSCULAR; INTRAVENOUS; SUBCUTANEOUS at 06:23

## 2017-11-30 RX ADMIN — ACETAMINOPHEN 650 MG: 325 TABLET ORAL at 11:46

## 2017-11-30 RX ADMIN — TRAMADOL HYDROCHLORIDE 50 MG: 50 TABLET, FILM COATED ORAL at 08:49

## 2017-11-30 RX ADMIN — SODIUM CHLORIDE 500 ML: 900 INJECTION, SOLUTION INTRAVENOUS at 07:52

## 2017-11-30 RX ADMIN — LUBIPROSTONE 24 MCG: 8 CAPSULE, GELATIN COATED ORAL at 00:05

## 2017-11-30 RX ADMIN — NALOXONE HYDROCHLORIDE 0.1 MG: 0.4 INJECTION, SOLUTION INTRAMUSCULAR; INTRAVENOUS; SUBCUTANEOUS at 07:16

## 2017-11-30 RX ADMIN — TRAMADOL HYDROCHLORIDE 100 MG: 50 TABLET, FILM COATED ORAL at 15:18

## 2017-11-30 RX ADMIN — CEFAZOLIN SODIUM 2 G: 2 SOLUTION INTRAVENOUS at 07:13

## 2017-11-30 RX ADMIN — NALOXONE HYDROCHLORIDE 0.1 MG: 0.4 INJECTION, SOLUTION INTRAMUSCULAR; INTRAVENOUS; SUBCUTANEOUS at 06:29

## 2017-11-30 RX ADMIN — DOCUSATE SODIUM 100 MG: 100 CAPSULE ORAL at 00:05

## 2017-11-30 RX ADMIN — ACETAMINOPHEN 650 MG: 325 TABLET ORAL at 23:35

## 2017-11-30 RX ADMIN — HYDROCODONE BITARTRATE AND ACETAMINOPHEN 1 TABLET: 5; 325 TABLET ORAL at 20:50

## 2017-11-30 RX ADMIN — AMLODIPINE BESYLATE 10 MG: 5 TABLET ORAL at 00:05

## 2017-11-30 RX ADMIN — ACETAMINOPHEN 650 MG: 325 TABLET ORAL at 06:28

## 2017-11-30 RX ADMIN — CEFAZOLIN SODIUM 2 G: 2 SOLUTION INTRAVENOUS at 00:05

## 2017-11-30 RX ADMIN — HYDROCODONE BITARTRATE AND ACETAMINOPHEN 1 TABLET: 5; 325 TABLET ORAL at 17:01

## 2017-11-30 RX ADMIN — NALOXONE HYDROCHLORIDE 0.1 MG: 0.4 INJECTION, SOLUTION INTRAMUSCULAR; INTRAVENOUS; SUBCUTANEOUS at 07:13

## 2017-11-30 NOTE — PROGRESS NOTES
0725  Pt is  Lying in bed. Pt is still  drowsy but wakes up to verbal stimuli. Pt received narcan from prevoies shift. LILA marroquin aware of  above. 7:55 AM   LILA Marroquin ordered  ml bolus  and discontinued PRN Percocet. 8:57 AM   Pt is awake, alert, oriented x 4. Pt opening eyes spontaneously. Breakfast served. Pain level 7/10. Tramadol 50 mg po given. Dressing to back dry and intact. Lungs are clear. Abdomen obese with hypoactive BS. With hemovac drain on left with sanguinous drainage. Hemovac drain on right side without drainage. With torres cath draining clear yellow urine. BP after bolus is 134/96 .     10:39 AM   Pt remains awake , alert, and oriented x4. Family at bedside. 11:40 AM   Pt ambulated with PT to doorway of room then back in bed. Torres cath removed. 11:46 AM   Medicated with Tyleenol 650 mg po for pain level 7/10.    1:31 PM   Pt medicated with Valium 2 mg po for pain level 7/10.    1505  Pt was seen by Pt. Pt got out to [facility] and voided somesome urine. Will sit on [facility] for few more minutes. 4:12 PM  Pt is back in bed. Pt is awake, alert, oriented x 4. Pain level 7/10. As per pt, Tramadol and valium was not helping.    5:03 PM  LILA Landeros was made aware that medication not giving relief to pt. Also made aware that pt did not clear PT. Medicated with Norco 5/325 mg po for viola as ordered. .   1800  Out of bed to chair and served dinner. Pain level 7/10.

## 2017-11-30 NOTE — PROGRESS NOTES
Problem: Falls - Risk of  Goal: *Absence of Falls  Document Uday Fall Risk and appropriate interventions in the flowsheet.    Outcome: Progressing Towards Goal  Fall Risk Interventions:  Mobility Interventions: Assess mobility with egress test, OT consult for ADLs, Patient to call before getting OOB, Utilize walker, cane, or other assitive device, PT Consult for assist device competence         Medication Interventions: Patient to call before getting OOB    Elimination Interventions: Call light in reach, Toileting schedule/hourly rounds, Patient to call for help with toileting needs

## 2017-11-30 NOTE — PROGRESS NOTES
Problem: Falls - Risk of  Goal: *Absence of Falls  Document Uday Fall Risk and appropriate interventions in the flowsheet.    Outcome: Progressing Towards Goal  Fall Risk Interventions:  Mobility Interventions: Assess mobility with egress test    Mentation Interventions: Door open when patient unattended    Medication Interventions: Patient to call before getting OOB    Elimination Interventions: Call light in reach

## 2017-11-30 NOTE — PERIOP NOTES
TRANSFER - OUT REPORT:    Verbal report given to Jorge Alberto Aguilar RN (name) on Allan Mayela  being transferred to 2 S (unit) for routine progression of care       Report consisted of patients Situation, Background, Assessment and   Recommendations(SBAR). Information from the following report(s) SBAR, Kardex, Procedure Summary and Intake/Output was reviewed with the receiving nurse. Lines:   Peripheral IV 11/29/17 Left Hand (Active)   Site Assessment Clean, dry, & intact 11/29/2017 11:00 PM   Phlebitis Assessment 0 11/29/2017 11:00 PM   Infiltration Assessment 0 11/29/2017 11:00 PM   Dressing Status Clean, dry, & intact 11/29/2017 11:00 PM   Dressing Type Transparent;Tape 11/29/2017 11:00 PM   Hub Color/Line Status Infusing 11/29/2017 11:00 PM   Alcohol Cap Used No 11/29/2017  1:56 PM        Opportunity for questions and clarification was provided.       Patient transported with:   O2 @ 2 liters  Registered Nurse

## 2017-11-30 NOTE — PROGRESS NOTES
2345: Received client from PACU in satisfactory condition. Client is a pt of Dr. Diane Wise. Pt had a L3-S1 Decompression and Fusion today. Client is oriented x 4, VERY DROWSY. Client is calm and cooperative. Clients PERRLA. No numbness or tingling to bilateral lower extremities. palpable Radial,Posterior Tibial and Dorsalis Pedis pulses. Capillary Refill less than 3 seconds. Skin is warm , dry and skin color is appropriate to race. Client is negative for JVD. Bibasilar breath sounds clear. No use of accessory muscles. Bowel sounds hypoactive to all 4 quadrants. Abdomen is soft and non-tender. Client has a torres catheter post-operatively. No bladder distention evident. No complaints of bladder discomfort. Client has a ABD PAD AND TAPE dressing to the mid-lower back. Dressing is clean, dry and intact. No other skin integrity issues present. Jose E hose applied. Sequential compression device applied. Client has 18 gauge PIV present and running LR at 125 ml/hr. Client had DILAUDID PCA 0.1Bolus/0.2Basal/10*times a hour/. Clients pain is 8/10 on 0-10 scale. Client oriented to call bell use as well as bed use. Call bell within reach. Bed in low position. Three side rails up. 2 HEMOVAC DRAINS, LEFT IS DEEP, RIGHT IS SKIN, BOTH INTACT. 0030: Patient still very drowsy, able to take PO meds. Assisted with crackers and water. Patient still c/o of pain to lower back 7-8/10, patient re-educated on PCA use. 0200: Patient repositioned in bed.     0300: 100 ml Serosanguinous output on left side hemovac (deep). 1773: Patient still unable to stay awake, able to answer short questions and falls back asleep. Narcan given per MAR. PCA Dilaudid stopped. 2677: patient awake, able to maintain conversation, eating crackers. Patient c/o 8/10, Tylenol given per [facility].     2061: patient assisted on edge of bed with some difficulty trying to stay awake, despite Narcan given. Patient drowsy,  unable to stay awake on edge of bed. 0725: patient assisted back to bed. Patient rated pain 7/10, drowsy, arouse to verbal stimuli but unable to stay wake for along period of time. LILA Marroquin and Dr. Saint Ducking aware of patient's condition. 0755: NS bolus 500 ml started per new order.

## 2017-11-30 NOTE — PROGRESS NOTES
Problem: Self Care Deficits Care Plan (Adult)  Goal: *Acute Goals and Plan of Care (Insert Text)  Occupational Therapy Goals  Initiated 11/30/2017 within 7 day(s). 1.  Patient will perform lower body dressing with supervision/set-up using AE prn.   2.  Patient will perform toilet transfers with supervision/set-up. 3.  Patient will perform all aspects of toileting with supervision/set-up. 4.  Patient will complete standing with supervision/set-up for 5 minutes during ADL to increase activity tolerance for functional activity. Occupational Therapy EVALUATION    Patient: Malathi Hernandez (31 y.o. female)  Date: 11/30/2017  Primary Diagnosis: FACET ARTHROSIS,LUMBAR DISC DEGENERATION,LUMBAGO,LUMBAR STENOSIS,HYPERTENSION,OBESITY  Spinal stenosis, lumbar  Procedure(s) (LRB):  L3-S1 REVISION DECOMPRESSION AND FUSION W/C-ARM \"SPEC POP\" (N/A) 1 Day Post-Op   Precautions:  Back, Fall, Spinal    ASSESSMENT :  Based on the objective data described below, the patient presents with L3-S1 decompression and fusion revision. Pt completed UB dressing with set-up and LB dressing with mod/max assist. Pt may benefit from AE for LB dressing. Pt completed transfers and functional mobility with CGA using RW and verbal cues for direction. Pt needed additional time to complete activities. Pt education on fall prevention and home safety. Pt could benefit from OT to increase I with above mentioned deficits. Patient will benefit from skilled intervention to address the above impairments.   Patients rehabilitation potential is considered to be Good  Factors which may influence rehabilitation potential include:   []             None noted  []             Mental ability/status  []             Medical condition  []             Home/family situation and support systems  []             Safety awareness  []             Pain tolerance/management  []             Other:      PLAN :  Recommendations and Planned Interventions:  [x] Self Care Training                  [x]        Therapeutic Activities  [x]               Functional Mobility Training    []        Cognitive Retraining  [x]               Therapeutic Exercises           [x]        Endurance Activities  [x]               Balance Training                   []        Neuromuscular Re-Education  []               Visual/Perceptual Training     [x]   Home Safety Training  [x]               Patient Education                 [x]        Family Training/Education  []               Other (comment):    Frequency/Duration: Patient will be followed by occupational therapy 1-2 times per day/4-7 days per week to address goals. Discharge Recommendations: Home Health  Further Equipment Recommendations for Discharge: N/A     SUBJECTIVE:   Patient stated I'm doing better now.     OBJECTIVE DATA SUMMARY:     Past Medical History:   Diagnosis Date    Arthritis     osteo-lower back    Chronic pain     back    Hypertension     10 years    [facility] [address] [address]     Thrombo[address]olus ([facility] [address]     PE- leslie dickinson MD's felt it was from the activity     Past Surgical History:   Procedure Laterality Date    HX  SECTION      X 2    HX GASTRIC BYPASS      HX ORTHOPAEDIC  4-12    L4-L5 surgery    HX ROTATOR CUFF REPAIR Left     HX TUBAL LIGATION       Barriers to Learning/Limitations: None  Compensate with: visual, verbal, tactile, kinesthetic cues/model  Prior Level of Function/Home Situation: I with ADLs prior to admission  Home Situation  Home Environment: Private residence  # Steps to Enter: 1  One/Two Story Residence: Two story  # of Interior Steps: 13  Lift Chair Available: No  Living Alone: No  Support Systems: Family member(s)  Patient Expects to be Discharged to[de-identified] Private residence  Current DME Used/Available at Home: Raised toilet seat, Walker, rolling  []  Right hand dominant   []  Left hand dominant  Cognitive/Behavioral Status:  Neurologic State: Alert  Orientation Level: Oriented X4  Cognition: Appropriate decision making; Follows commands  Safety/Judgement: Awareness of environment; Fall prevention  Skin: incision on lower back covered with dressing  Edema: none noted  Vision/Perceptual:  N/A  Coordination:  Coordination: Within functional limits  Fine Motor Skills-Upper: Left Intact; Right Intact    Gross Motor Skills-Upper: Left Intact; Right Intact  Balance:  Sitting: Intact  Standing: Impaired; With support  Standing - Static: Fair  Standing - Dynamic : Fair  Strength:  Strength: Within functional limits  Tone & Sensation:  Tone: Normal  Sensation: Intact  Range of Motion:  AROM: Within functional limits  PROM: Generally decreased, functional  Functional Mobility and Transfers for ADLs:  Bed Mobility:  Rolling: Contact guard assistance; Additional time  Supine to Sit: Contact guard assistance; Additional time  Sit to Supine: Minimum assistance (VCing)  Scooting: Supervision; Additional time  Transfers:  Sit to Stand: Minimum assistance;Contact guard assistance; Additional time  Bed to Chair: Minimum assistance;Contact guard assistance; Additional time (to [facility])  ADL Assessment:  Upper Body Dressing: Supervision  Lower Body Dressing: Moderate assistance  Toileting: Minimum assistance  ADL Intervention:  Cognitive Retraining  Safety/Judgement: Awareness of environment; Fall prevention    Pain:  Pain Scale 1: Numeric (0 - 10)  Pain Intensity 1: 7  Pain Location 1: Back  Pain Orientation 1: Lower  Pain Description 1: Aching  Pain Intervention(s) 1: Medication (see MAR)  Activity Tolerance:   fair  Please refer to the flowsheet for vital signs taken during this treatment.   After treatment:   [] Patient left in no apparent distress sitting up in chair  [x] Patient left in no apparent distress in bed  [x] Call bell left within reach  [x] Nursing notified  [x] Caregiver present  [] Bed alarm activated    COMMUNICATION/EDUCATION:   [x] Home safety education was provided and the patient/caregiver indicated understanding. [x] Patient/family have participated as able in goal setting and plan of care. [x] Patient/family agree to work toward stated goals and plan of care. [] Patient understands intent and goals of therapy, but is neutral about his/her participation. [] Patient is unable to participate in goal setting and plan of care.     Thank you for this referral.  Prabhakar Martinez, OTR/L  Time Calculation: 44 mins

## 2017-11-30 NOTE — PROGRESS NOTES
Progress Note POD #1      Patient: Terence Yen               Sex: female          DOA: 11/29/2017         YOB: 1955      Surgery: Procedure(s):  L3-S1 REVISION DECOMPRESSION AND FUSION W/C-ARM \"SPEC POP\"           LOS: 1 day               Subjective:      Very sleepy. Objective:      Visit Vitals    /81 (BP 1 Location: Right arm, BP Patient Position: At rest)    Pulse (!) 105    Temp 98.5 °F (36.9 °C)    Resp 16    Ht 5' 3.5\" (1.613 m)    Wt 118.5 kg (261 lb 4 oz)    SpO2 95%    BMI 45.55 kg/m2       Physical Exam:  Neurological: motor strength: 5/5 in lower extremities bilaterally                          sensation: intact to light touch  Patient mobility                         Intake and Output:  Current Shift:     Last three shifts:  11/28 1901 - 11/30 0700  In: 4450 [P.O.:700; I.V.:3750]  Out: 6929 [Urine:1850; Drains:150]    Lab/Data Reviewed:    Lab/Data Reviewed:  Lab Results   Component Value Date/Time    WBC 18.0 11/30/2017 02:54 AM    HGB 12.2 11/30/2017 02:54 AM    HCT 38.3 11/30/2017 02:54 AM    PLATELET 227 00/56/5271 02:54 AM    MCV 85.9 11/30/2017 02:54 AM     Lab Results   Component Value Date/Time    aPTT 30.7 07/19/2012 01:30 PM     Lab Results   Component Value Date/Time    INR 0.9 07/19/2012 01:30 PM    INR 1.0 03/28/2012 02:38 PM    Prothrombin time 12.0 07/19/2012 01:30 PM    Prothrombin time 12.4 03/28/2012 02:38 PM            Assessment/Plan     Principal Problem:    Spinal stenosis, lumbar (11/15/2017)    Active Problems:    HNP (herniated nucleus pulposus), lumbar (11/15/2017)      Status post lumbar surgery (11/15/2017)        1. Stable  2. OOB with PT  3. D/C Planning  4. Somnolence-D/C PCA now. Narcan prn. Give Fluid bolus. 5. D/C torres  6. D/C drain & change dressing prior to discharge home. 7.Discharge to home after being cleared by P.T  8. Low normal BP- give fluid bolus. Set BP med parameters. 9. Reduce narcotics. Tylenol only for now.

## 2017-11-30 NOTE — PERIOP NOTES
Patient received by Pavan Red RN ,Blood pressure (!) 160/96, pulse 95, temperature 97.8 °F (36.6 °C), resp. rate 15, height 5' 3.5\" (1.613 m), weight 118.5 kg (261 lb 4 oz), SpO2 99 %. Patient stable, all questions answered.

## 2017-11-30 NOTE — ROUTINE PROCESS
Bedside and Verbal shift change report given to Brigid Huang RN (oncoming nurse) by Lakeisha Medina RN (offgoing nurse). Report included the following information SBAR, Kardex and MAR.

## 2017-11-30 NOTE — ANESTHESIA POSTPROCEDURE EVALUATION
Post-Anesthesia Evaluation & Assessment    Visit Vitals    BP (!) 155/96    Pulse 99    Temp 36.4 °C (97.5 °F)    Resp 14    Ht 5' 3.5\" (1.613 m)    Wt 118.5 kg (261 lb 4 oz)    SpO2 95%    BMI 45.55 kg/m2       No untreated/active PONV    Post-operative hydration adequate. Adequate post-operative analgesia per PACU discharge criteria    Mental status & level of consciousness: alert and oriented x 3    Respiratory status: patent unassisted airway     No apparent anesthetic complications requiring additional post-anesthetic care    Patient has met all discharge requirements.             Evert Mendez MD

## 2017-11-30 NOTE — PERIOP NOTES
TRANSFER - IN REPORT:    Verbal report received from [facility] (name) on Terence Yen  being received from OR (unit) for routine progression of care      Report consisted of patients Situation, Background, Assessment and   Recommendations(SBAR). Information from the following report(s) OR Summary, Procedure Summary, Intake/Output and MAR was reviewed with the receiving nurse. Opportunity for questions and clarification was provided. Assessment completed upon patients arrival to unit and care assumed.

## 2017-11-30 NOTE — PROGRESS NOTES
Problem: Mobility Impaired (Adult and Pediatric)  Goal: *Acute Goals and Plan of Care (Insert Text)  Physical Therapy Goals LT/ST  Initiated 11/30/2017 and to be accomplished within 3-5 day(s)  1. Patient will move from supine <> sit with S in prep for out of bed activity and change of position. 2.  Patient will perform sit<> stand with S with LRAD in prep for transfers/ambulation. 3.  Patient will transfer from bed <> chair with S with LRAD for time up in chair for completion of ADL activity. 4.  Patient will ambulate 150 feet with LRAD/S for improved functional mobility/safe discharge. 5.  Patient will ascend/descend 3-5 stairs with handrail with contact guard assist for home re-entry as needed. Outcome: Progressing Towards Goal  physical Therapy EVALUATION    Patient: Anthony Barton (89 y.o. female)  Date: 11/30/2017  Primary Diagnosis: FACET ARTHROSIS,LUMBAR DISC DEGENERATION,LUMBAGO,LUMBAR STENOSIS,HYPERTENSION,OBESITY  Spinal stenosis, lumbar  Procedure(s) (LRB):  L3-S1 REVISION DECOMPRESSION AND FUSION W/C-ARM \"SPEC POP\" (N/A) 1 Day Post-Op   Precautions:Back, Fall, Spinal    ASSESSMENT :  Based on the objective data described below, the patient presents with decrease independence w/ bed mobility, transfers, gait and step negotiation. Pt seen in bed prior to session w/ supplemental O2, IV, heme vac (2), torres catheter, and B/L SCDs w/ pt's family present in the room. Pt reported 6/10 pain prior to session. Per nurse pt did not require O2 to participate in session. Pt requiring Vcing and Manual cueing for proper bed mobility task. Once pt was sitting at the EOB, pt reported increase dizziness. Pt's BP assessed at 145/95 at this time w/ O2 levels assessed at 97. Pt educated on how to proper don/dof brace during session. Once pt's dizziness subsided, pt able to perform transfers sit to stand but required VCing for proper hand placement w/ RW.  Pt able to ambulate 30 ft w/RW/GB/LSO but demonstrates step to antalgic gait, decrease bill, decrease stride length, and decrease step clearance. Pt continued to report weakness in numbness of the anterior thigh while ambulating however pt able to maintain stability. Pt reported decrease activity tolerance and was transferred back to bed. Pt left in supine after session, call bell and tray in reach, nurse notified after session. Patient will benefit from skilled intervention to address the above impairments. Patients rehabilitation potential is considered to be Good  Factors which may influence rehabilitation potential include:   []         None noted  []         Mental ability/status  []         Medical condition  [x]         Home/family situation and support systems  [x]         Safety awareness  [x]         Pain tolerance/management  []         Other:      PLAN :  Recommendations and Planned Interventions:  [x]           Bed Mobility Training             []    Neuromuscular Re-Education  [x]           Transfer Training                   []    Orthotic/Prosthetic Training  [x]           Gait Training                          []    Modalities  [x]           Therapeutic Exercises          []    Edema Management/Control  [x]           Therapeutic Activities            [x]    Patient and Family Training/Education  []           Other (comment):    Frequency/Duration: Patient will be followed by physical therapy twice daily to address goals. Discharge Recommendations: Home Health  Further Equipment Recommendations for Discharge: rolling walker     SUBJECTIVE:   Patient stated I'm just so happy I'm moving.     OBJECTIVE DATA SUMMARY:     Past Medical History:   Diagnosis Date    Arthritis     osteo-lower back    Chronic pain     back    Hypertension     10 years    Morbid obesity ([address]     Thromboembolus ([facility] [address]     PE- jumping rope MD's felt it was from the activity     Past Surgical History:   Procedure Laterality Date    HX  SECTION      X 2    HX GASTRIC BYPASS  2005    HX ORTHOPAEDIC  4-12    L4-L5 surgery    HX ROTATOR CUFF REPAIR Left     HX TUBAL LIGATION       Barriers to Learning/Limitations: yes;  physical  Compensate with: Verbal Cues and Tactile Cues  Prior Level of Function/Home Situation:   Home Situation  Home Environment: Private residence  # Steps to Enter: 1  One/Two Story Residence: Two story  # of Interior Steps: 13  Lift Chair Available: No  Living Alone: No  Support Systems: Family member(s)  Patient Expects to be Discharged to[de-identified] Private residence  Current DME Used/Available at Home: Raised toilet seat, Walker, rolling  Critical Behavior:  Neurologic State: Alert  Orientation Level: Oriented X4  Cognition: Appropriate decision making; Follows commands  Safety/Judgement: Awareness of environment; Fall prevention  Strength:    Strength: Generally decreased, functional  Tone & Sensation:   Tone: Normal  Sensation: Intact  Range Of Motion:  AROM: Generally decreased, functional  PROM: Generally decreased, functional  Functional Mobility:  Bed Mobility:  Rolling: Contact guard assistance; Additional time (VCing)  Supine to Sit: Minimum assistance; Additional time (VCing)   Sit to Supine: Minimum assistance; Additional time (VCing)  Scooting: Supervision; Additional time   Transfers:  Sit to Stand: Minimum assistance;Contact guard assistance; Additional time (VCing)  Stand to Sit: Minimum assistance;Contact guard assistance; Additional time (VCing)  Balance:   Sitting: Intact  Standing: Impaired; With support  Standing - Static: Fair  Standing - Dynamic : Fair  Ambulation/Gait Training:  Distance (ft): 30 Feet (ft)  Assistive Device: Gait belt;Brace/Splint; Walker, rolling  Ambulation - Level of Assistance: Minimal assistance;Contact guard assistance; Additional time  Gait Description (WDL): Exceptions to WDL  Gait Abnormalities: Antalgic;Decreased step clearance; Step to gait  Base of Support: Widened  Stance: Time;Weight shift  Speed/Mary: Slow  Step Length: Left shortened;Right shortened  Swing Pattern: Right asymmetrical;Left asymmetrical  Interventions: Safety awareness training;Verbal cues  Pain:  Pain Scale 1: Numeric (0 - 10)  Pain Intensity 1: 7  Pain Location 1: Back  Pain Orientation 1: Lower  Pain Description 1: Aching  Pain Intervention(s) 1: Medication (see MAR)  Activity Tolerance:   Fair  Please refer to the flowsheet for vital signs taken during this treatment. After treatment:   []         Patient left in no apparent distress sitting up in chair  [x]         Patient left in no apparent distress in bed  [x]         Call bell left within reach  [x]         Nursing notified  [x]         Caregiver present  []         Bed alarm activated    COMMUNICATION/EDUCATION:   [x]         Fall prevention education was provided and the patient/caregiver indicated understanding. [x]         Patient/family have participated as able in goal setting and plan of care. [x]         Patient/family agree to work toward stated goals and plan of care. []         Patient understands intent and goals of therapy, but is neutral about his/her participation. []         Patient is unable to participate in goal setting and plan of care.     Thank you for this referral.  Aryan Mendoza, PT   Time Calculation: 42 mins

## 2017-11-30 NOTE — PROGRESS NOTES
Chart reviewed. Met with patient at bedside. Pt states her sister (who she states is a CNA) will be at home to assist.  Initially patient stated she would like to decline home health due to co-payment. Pt offered FOC and given list.  Pt has chosen [facility] for [address], if she is able to afford co-pay. Referral given to [facility] liaison and co-pay concerns expressed to xenia. Pt states she has RW at home. CM will remain available for discharge planning. Care Management Interventions  PCP Verified by CM:  Yes  Transition of Care Consult (CM Consult): Home Health, Other (pt states she will accept [facility] if she can afford co-pay)  [address]: Yes  Discharge Durable Medical Equipment: No  Physical Therapy Consult: Yes  Occupational Therapy Consult: Yes  Current Support Network: Own Home  Plan discussed with Pt/Family/Caregiver: Yes  Freedom of Choice Offered: Yes  Discharge Location  Discharge Placement: Home

## 2017-11-30 NOTE — PERIOP NOTES
Family updated and given room 208, going to 2 [facility], [place], RN reviewing SBAR and will call back for report

## 2017-11-30 NOTE — OP NOTES
Patient: Bharath Forde MRN: [id number]  SSN: [id number]    YOB: 1955  Age: 63 y.o. Sex: female        Date of Procedure: 11/29/2017   Preoperative Diagnosis: FACET ARTHROSIS,LUMBAR DISC DEGENERATION,LUMBAGO,LUMBAR STENOSIS,HYPERTENSION,OBESITY  Postoperative Diagnosis: FACET ARTHROSIS,LUMBAR DISC     Procedure: Procedure(s):  L3-S1 REVISION DECOMPRESSION AND FUSION W/C-ARM \"SPEC POP\"  Surgeon(s) and Role:     * Karo Daly MD - Primary  Assistant: Dangelo Wilson PA-C  Anesthesia: General   Estimated Blood Loss: 225cc  Fluids: 1000cc   Specimens: * No specimens in log *   Findings: same  Complications: none  Implants:   Implant Name Type Inv. Item Serial No.  Lot No. LRB No. Used Action   GRAFT PUTTY DBM H-GENIN 10CC --  - [id number]  GRAFT PUTTY DBM H-GENIN 10CC --  [id number] SPINEFRONTIER [id number] N/A 1 Implanted   CAP LCK CLICKX E/7-O HD TI --  - [id number]  CAP LCK CLICKX N/8-C HD TI --   [employer] [place] [id number] N/A 6 Implanted   [facility] 6X85 --  - [id number]  [facility] 6X85 --   [employer] [place] [id number] N/A 2 Implanted   [facility] 42-55 --  - [id number]  [facility] 42-55 --   [employer] [place] [id number] N/A 1 Implanted   SCR SPNE PEDCL PA [id number] TI -- CLICKX - [id number]  SCR SPNE PEDCL PA [id number] TI -- CLICKX  [employer] [place] [id number] N/A 2 Implanted   SCR SPNE PEDCL PA [id number] TI -- CLICKX - [id number]  SCR SPNE PEDCL PA [id number] TI -- CLICKX  [employer] [place] [id number] N/A 3 Implanted   SCR SPNE PEDCL PA [id number] TI -- CLICKX - [id number]   SCR SPNE PEDCL PA [id number] TI -- CLICKX   [employer] [place] [id number] N/A 1 Implanted         Indications: This is a 63y.o. year-old female who presents with significant back   and bilateral lower extremity pain, worsening ability to do activities of daily living. X-rays and MRI scan revealing severe spinal stenosis, degenerative disk disease and disk herniation.  Having failed conservative care, he comes for operative intervention. DESCRIPTION OF PROCEDURE: After correct identification, the patient was   taken to the operating room, placed supine on the table, general   endotracheal anesthesia induced. 2grams of Ancef was given. Patient was then rotated prone onto the [facility]le[facility] [place] table. Lumbar spine was prepped and draped in the usual sterile fashion. Midline incision was made in the lumbar spine, taken down to the spinous processes of L3-S1. The posterior transverse processes bilaterally were expose at L3-S1 as seen on intraoperative fluoroscopy. Gelpi retractors were then placed. The wound was then irrigated. Complete wide revision  laminectomy was performed at L4 as well as the inferior   half of L3 bilaterally. Removal of more than 1/2 of the medial facets bilaterally allowed excellent midline decompression. Foraminotomies which included undercutting the pars intra-articularis were then performed bilaterally at L3-4 until a [place] 3 was easily passed through each of the neural foramen. This allowed visualization of the L3 and L4 nerve roots. The wound   was then irrigated. Bur was then used to open the posterior aspect of the   pedicles bilaterally at L3. A gearshift probe was then placed through   each of these posterior bur holes, through the pedicle, into vertebral body   under intraoperative fluoroscopy. Ball-tipped probe was then placed through   each gearshift tracts, ensuring the gearshift had only gone through the bone. The pedicle screws at L4-S1 were tested and found to be loose. All screws were removed. It was found that the screw holes at L4 had been eroded to large to accept a 9mm screw so it was elected to not replace those screws. Pedicle screws from [employer] spinal system were then placed bilaterally at L3-S1 with 8mm replacement screws at L5 and S1. Rods were then fixed to the pedicle screws using the locking caps. Each of these caps were then torqued into position.  Intraoperative x-ray revealed excellent alignment of the  hardware and anatomy. Wound was then irrigated with 1000cc of pulse lavage irrigation containing Bacitracin. Bur was then used to open the posterior aspect of the facet joints and transverse process bilaterally as well as removing of the cartilage surface of the facet joints. Bone graft that was taken from the laminectomy site was then placed in the   posterolateral gutters as well as in facet joints. Drain was then placed. Fascia was then closed using #1 Vicryl suture. Subcutaneous tissue   approximated with 2-0 Vicryl suture. Skin approximated with staples. Sterile dressing was applied. The patient tolerated the procedure well and taken to Recovery room in good   condition.

## 2017-12-01 VITALS
RESPIRATION RATE: 17 BRPM | BODY MASS INDEX: 44.6 KG/M2 | HEART RATE: 112 BPM | WEIGHT: 261.25 LBS | TEMPERATURE: 98.4 F | DIASTOLIC BLOOD PRESSURE: 85 MMHG | HEIGHT: 64 IN | SYSTOLIC BLOOD PRESSURE: 140 MMHG | OXYGEN SATURATION: 96 %

## 2017-12-01 LAB
ERYTHROCYTE [DISTWIDTH] IN BLOOD BY AUTOMATED COUNT: 14.5 % (ref 11.6–14.5)
HCT VFR BLD AUTO: 33.7 % (ref 35–45)
HGB BLD-MCNC: 10.9 G/DL (ref 12–16)
MCH RBC QN AUTO: 27.6 PG (ref 24–34)
MCHC RBC AUTO-ENTMCNC: 32.3 G/DL (ref 31–37)
MCV RBC AUTO: 85.3 FL (ref 74–97)
PLATELET # BLD AUTO: 234 K/UL (ref 135–420)
PMV BLD AUTO: 10.2 FL (ref 9.2–11.8)
RBC # BLD AUTO: 3.95 M/UL (ref 4.2–5.3)
WBC # BLD AUTO: 13.3 K/UL (ref 4.6–13.2)

## 2017-12-01 PROCEDURE — 74011250637 HC RX REV CODE- 250/637: Performed by: ORTHOPAEDIC SURGERY

## 2017-12-01 PROCEDURE — 74011250637 HC RX REV CODE- 250/637: Performed by: PHYSICIAN ASSISTANT

## 2017-12-01 PROCEDURE — 97530 THERAPEUTIC ACTIVITIES: CPT

## 2017-12-01 PROCEDURE — 36415 COLL VENOUS BLD VENIPUNCTURE: CPT | Performed by: PHYSICIAN ASSISTANT

## 2017-12-01 PROCEDURE — 85027 COMPLETE CBC AUTOMATED: CPT | Performed by: PHYSICIAN ASSISTANT

## 2017-12-01 PROCEDURE — 97116 GAIT TRAINING THERAPY: CPT

## 2017-12-01 PROCEDURE — 77030011256 HC DRSG MEPILEX <16IN NO BORD MOLN -A

## 2017-12-01 RX ORDER — HYDROCODONE BITARTRATE AND ACETAMINOPHEN 5; 325 MG/1; MG/1
1-2 TABLET ORAL
Status: DISCONTINUED | OUTPATIENT
Start: 2017-12-01 | End: 2017-12-01 | Stop reason: HOSPADM

## 2017-12-01 RX ADMIN — Medication 10 ML: at 06:59

## 2017-12-01 RX ADMIN — LOSARTAN POTASSIUM 100 MG: 50 TABLET ORAL at 07:32

## 2017-12-01 RX ADMIN — LUBIPROSTONE 24 MCG: 8 CAPSULE, GELATIN COATED ORAL at 07:32

## 2017-12-01 RX ADMIN — HYDROCODONE BITARTRATE AND ACETAMINOPHEN 1 TABLET: 5; 325 TABLET ORAL at 01:06

## 2017-12-01 RX ADMIN — AMLODIPINE BESYLATE 10 MG: 5 TABLET ORAL at 07:32

## 2017-12-01 RX ADMIN — HYDROCODONE BITARTRATE AND ACETAMINOPHEN 1 TABLET: 5; 325 TABLET ORAL at 13:33

## 2017-12-01 RX ADMIN — DOCUSATE SODIUM 100 MG: 100 CAPSULE ORAL at 07:32

## 2017-12-01 RX ADMIN — HYDROCODONE BITARTRATE AND ACETAMINOPHEN 1 TABLET: 5; 325 TABLET ORAL at 04:38

## 2017-12-01 RX ADMIN — HYDROCODONE BITARTRATE AND ACETAMINOPHEN 2 TABLET: 5; 325 TABLET ORAL at 09:36

## 2017-12-01 NOTE — PROGRESS NOTES
Problem: Mobility Impaired (Adult and Pediatric)  Goal: *Acute Goals and Plan of Care (Insert Text)  Physical Therapy Goals LT/ST  Initiated 11/30/2017 and to be accomplished within 3-5 day(s)  1. Patient will move from supine <> sit with S in prep for out of bed activity and change of position. 2.  Patient will perform sit<> stand with S with LRAD in prep for transfers/ambulation. 3.  Patient will transfer from bed <> chair with S with LRAD for time up in chair for completion of ADL activity. 4.  Patient will ambulate 150 feet with LRAD/S for improved functional mobility/safe discharge. 5.  Patient will ascend/descend 3-5 stairs with handrail with contact guard assist for home re-entry as needed. Outcome: Resolved/Met Date Met: 12/01/17  physical Therapy TREATMENT/DISCHARGE    Patient: Janet Goldberg (35 y.o. female)  Date: 12/1/2017  Diagnosis: FACET ARTHROSIS,LUMBAR DISC DEGENERATION,LUMBAGO,LUMBAR STENOSIS,HYPERTENSION,OBESITY  Spinal stenosis, lumbar Spinal stenosis, lumbar  Procedure(s) (LRB):  L3-S1 REVISION DECOMPRESSION AND FUSION W/C-ARM \"SPEC POP\" (N/A) 2 Days Post-Op  Precautions: Back, Fall, Spinal  Chart, physical therapy assessment, plan of care and goals were reviewed. ASSESSMENT:  Pt seen in bed prior to session w/ B/L SCDs donned. Pt reported 7/10 pain prior to session in lower back. Pt able to perform bed mobility task w/ VCing. Pt reports anterior thigh numbness however pt does not demonstrate any weakness of B/L LE during mobility task. Pt reported that she will be sleeping in a recliner upon d/c home. Pt able to don/doff brace independently w/ Vcing required. Pt initially required Vcing for proper hand placement w/ RW to ensure pt's safety to perform transfer task sit<>stand. Pt able to ambulate 150 ft w/ RW/GB/LSO w/ no signs of LOB but does demonstrate decrease bill and decrease step clearance.  Pt able to demonstrate step negotiation using box step for home entry and side step technique w/ L HR in order to enter into the bedroom once at home w/ no signs of LOB. Pt transferred to bed where back kit (reacher, sock aid, shoe aid) were administered to pt per pt's request. Pt educated on tools and reports understanding. Pt left sitting at the EOB, call bell and tray in reach, RW in reach, nurse notified after session. Pt has met all goals at this time, d/c from acute PT. Progression toward goals:  [x]      Goals met  []      Improving appropriately and progressing toward goals  []      Improving slowly and progressing toward goals  []      Not making progress toward goals and plan of care will be adjusted     PLAN:  Patient will be discharged from physical therapy at this time. Rationale for discharge:  [x] Goals Achieved  [] [address]  [] Patient not participating in therapy  [] Other:  Discharge Recommendations:  Home Health  Further Equipment Recommendations for Discharge:  N/A     SUBJECTIVE:   Patient stated My thighs still feel numb.     OBJECTIVE DATA SUMMARY:   Critical Behavior:  Neurologic State: Alert, Appropriate for age  Orientation Level: Oriented X4  Cognition: Appropriate decision making, Appropriate for age attention/concentration, Appropriate safety awareness, Follows commands  Safety/Judgement: Awareness of environment, Fall prevention  Functional Mobility Training:  Bed Mobility:  Rolling: Supervision; Additional time  Supine to Sit: Supervision; Additional time  Transfers:  Sit to Stand: Supervision  Stand to Sit: Supervision  Balance:  Sitting: Intact  Standing: Intact; With support  Standing - Static: Good  Standing - Dynamic : Fair  Ambulation/Gait Training:  Distance (ft): 150 Feet (ft)  Assistive Device: Brace/Splint;Gait belt;Walker, rolling  Ambulation - Level of Assistance: Supervision  Gait Abnormalities: Decreased step clearance  Base of Support: Widened  Stance: Weight shift  Speed/Mary: Slow  Step Length: Left shortened;Right shortened  Swing Pattern: Left asymmetrical;Right asymmetrical  Interventions: Safety awareness training; Tactile cues; Verbal cues  Stairs:  Number of Stairs Trained: 7  Stairs - Level of Assistance: Contact guard assistance   Rail Use: Left     Therapeutic Exercises:   None  Pain:  Pain Scale 1: Numeric (0 - 10)  Pain Intensity 1: 7  Pain Location 1: Back  Pain Orientation 1: Lower  Pain Description 1: Aching; Sharp  Pain Intervention(s) 1: Medication (see MAR)  Activity Tolerance:   Good  Please refer to the flowsheet for vital signs taken during this treatment.   After treatment:   [] Patient left in no apparent distress sitting up in chair  [x] Patient left in no apparent distress in bed  [x] Call bell left within reach  [x] Nursing notified  [] Caregiver present  [] Bed alarm activated  Ismael Kidney, PT   Time Calculation: 42 mins

## 2017-12-01 NOTE — PROGRESS NOTES
2020: patient ambulated in the hallway with asistance for 10 minutes, then back to back. 2100: Assessment completed and documented. Patient alert and oriented x 4, incision to LOWER BACK. ABD pads and tape dressing clean, dry and intact. Pain 8/10 to lower back on a 0-10/10 numeric scale. Patient denies numbness or tingling to bilateral lower and upper extremities. No nausea, no SOB, no distress. Patient educated on IS, and \"up for dinner program\", patient verbalized understanding. Hemovac deep to left, serosanguinous output. Hemovac skin to right side, no drainage noted. Patient medicated for pain per [facility]     8818: Patient assisted out of bed to the bathroom, patient c/o burning pain to lower back 8/10, patient given Tylenol and Valium per [facility]     0106: Patient given 1 tab of Norco for pain 8/10 to lower back. 0300: Patient was assisted to the bathroom by cna and c/o of pain 10/10 to lower back. 15 min later once back in bed, patient sleeping quietly in bed. Patient given 1 tab of [place] [facility]. Will medicate when med available per [facility]     9122: Patient assisted to the bathroom, tearfully when back to bed, rating pain 10/10. Patient given 1 tab of Norco, repositioned in bed on her right side with pillows supporting her back and in between her knees. 0787: patient assisted out of bed to the bathroom with back brace and walker. Patient rated pain 6/10. Sitting up to chair. 1222: /103, patient given all morning meds including BP meds.

## 2017-12-01 NOTE — DISCHARGE INSTRUCTIONS
[facility]  Dr. Bettie Shay ANYONE WHO SMOKES. AVOID ALL PRODUCTS THAT CONTAIN NICOTINE. DO NOT TAKE IBUPROFEN OR ANTI--INFLAMMATORIES, AS THESE MAY ALTER THE HEALING OF THE FUSION. ACTIVITIES :  *The first week after surgery   1. You may be up and walking about the house. 2.  Activities around the house, such as washing dishes, fixing light meals, and your own personal care are fine. 3.  Avoid strenuous activities, such as vacuuming, lifting laundry or grocery bags. 4.  Do not lift anything heavier than 1 gallon of milk (or about 5-8 pounds). 5.  Do not bend over to  items from the ground level until 3 months post-op. *Week 2 and beyond  1. You may gradually increase your activities, but still avoid heavy lifting, pushing/pulling. 2.  Walking is the best way to rebuild strength and stamina. Start SLOWLY and gradually increase the distance a little every week. 3.  Walk at a pace that avoids fatigue or severe pain. Do not try to walk several blocks the first day! As you increase the distance, you may feel tired. If so, stop and rest.   4.  You should be able to walk several blocks by your first clinic visit. 5.  Follow-up with Dr. Ivy Lentz in 10-14 days. BATHING and INCISION CARE:  1. The incision may be tender to touch or feel numb: this is normal.   2.  Keep the incision clean and dry. Do not get incision wet for 5 days. The incision will be closed with sutures under the skin and the skin will be glued. 3.  Do not apply any lotions, ointments or oils on the incision. 4.  If you notice any excessive swelling, redness, or persistent drainage around the incision, notify the office immediately. DRIVIN. You should not drive until after your follow-up appointment. 2.  You can be in a vehicle for short distances, but if you travel any long distance, please stop about every 30 minutes and walk/stretch. 3.  You should NEVER drive while taking narcotic medication. RETURN TO WORK :  1. The decision to return to work will be determined on an individual basis. 2.  Many people who have a strenuous job (construction, heavy labor, etc) may need to be off work for up to 12 weeks. 3.  If you need a work note, please let us know as soon as possible, and not the same day you are planning to return to work. NUTRITION :  1.  Good nutrition is an essential part of healing. 2.  You should eat a balanced diet each day, including fruits, vegetables, dairy products and protein. 3.  Remember to drink plenty of water. 4.  If you have not had a bowel movement within 3 days of surgery, you will need to use a laxative or suppository that can be obtained over-the-counter at your local pharmacy. BONE STIMULATOR:  1. A spinal bone stimulator may be prescribed for you under certain situations. 2.  A nurse will call you if one has been requested and discuss its use for approximately 4-6 months post-op every day. 3.  This device assists in bone healing and fusion. MEDICATIONS -  1. You may resume the medications you were taking before surgery, with the general exception of (or DO NOT TAKE) non-steroidal anti-inflammatory medications, such as Motrin, Aleve, Advil Naprosyn, Ibuprofen or aspirin. 2.  You will receive a prescription for pain medication at discharge from the hospital. The pain medication works best if taken before the pain becomes severe. 3.  To reduce stomach upset, always take the medication with food. 4.  Begin to wean yourself off the pain medication during the second week after discharge. 5.  If you need a refill, please call the office during working hours at least 2 days before your prescription runs out. Do not wait until your bottle is empty to call for a refill. 6.  DO NOT drive if you are taking narcotic pain medications.     HOME HEALTH CARE:  1.   A home health care service has been set-up for you to help assist you once you leave the hospital.  2.  They will contact you either before you leave the hospital or within 24 hours once you have been discharged home. 3. A nurse will assist you with your dressing changes and a Physical Therapist with help you with your therapy needs. CALL THE OFFICE:   If you have severe pain unrelieved by the medications, new numbness or tingling in your legs;   If you have a fever of 101.0°F or greater;    If you notice excessive swelling, redness, or persistent drainage from the incision or IV site; [facility] office number is [phone] from 8:00am to 5:00pm Monday through Friday. After 5:00pm, on weekends, or holidays, please leave a message with our answering service and the doctor on-call will get back to you shortly.

## 2017-12-01 NOTE — PROGRESS NOTES
Progress Note POD #      Patient: Jyotsna Jacobs               Sex: female          DOA: 11/29/2017         YOB: 1955      Surgery: Procedure(s):  L3-S1 REVISION DECOMPRESSION AND FUSION W/C-ARM \"SPEC POP\"           LOS: 2 days               Subjective:     No new complaints, More awake so pain is now in back    Objective:      Visit Vitals    BP (!) 150/103 (BP 1 Location: Right arm, BP Patient Position: Sitting)    Pulse 92    Temp 98.4 °F (36.9 °C)    Resp 17    Ht 5' 3.5\" (1.613 m)    Wt 118.5 kg (261 lb 4 oz)    SpO2 96%    Breastfeeding No    BMI 45.55 kg/m2       Physical Exam:  Neurological: motor strength: 5/5 in lower extremities bilaterally                          sensation: intact to light touch  Patient mobility  Bed Mobility Training  Rolling: Contact guard assistance, Additional time  Supine to Sit: Contact guard assistance, Additional time  Sit to Supine: Minimum assistance (VCing)  Scooting: Supervision, Additional time  Transfer Training  Sit to Stand: Minimum assistance, Contact guard assistance, Additional time  Stand to Sit: Contact guard assistance, Additional time  Bed to Chair: Minimum assistance, Contact guard assistance, Additional time (to [facility])      Gait Training  Assistive Device: Gait belt, Brace/Splint, Walker, rolling  Ambulation - Level of Assistance: Minimal assistance, Additional time  Distance (ft): 2 Feet (ft)  Interventions: Safety awareness training, Verbal cues       Patient Response  Patient Response: fair    Intake and Output:  Current Shift:  12/01 0701 - 12/01 1900  In: 240 [P.O.:240]  Out: -   Last three shifts:  11/29 1901 - 12/01 0700  In: 6910.4 [P.O.:1850;  I.V.:5060.4]  Out: 8550 [Urine:7875; Drains:450]    Lab/Data Reviewed:    Lab/Data Reviewed:  Lab Results   Component Value Date/Time    WBC 13.3 12/01/2017 03:24 AM    HGB 10.9 12/01/2017 03:24 AM    HCT 33.7 12/01/2017 03:24 AM    PLATELET 886 14/93/5886 03:24 AM    MCV 85.3 12/01/2017 03:24 AM     Lab Results   Component Value Date/Time    aPTT 30.7 07/19/2012 01:30 PM     Lab Results   Component Value Date/Time    INR 0.9 07/19/2012 01:30 PM    INR 1.0 03/28/2012 02:38 PM    Prothrombin time 12.0 07/19/2012 01:30 PM    Prothrombin time 12.4 03/28/2012 02:38 PM            Assessment/Plan     Active Problems:    Status post lumbar surgery (11/15/2017)        1. Stable  2. OOB with PT  3. D/C Planning  4.  D/C drain prior to discharge

## 2017-12-01 NOTE — PROGRESS NOTES
0666: Received report from [address] [place] [address] Mervin PETERSEN. Assumed pt care at this time. 9325: Assessment completed. Patient is A&O Clear. Patient is calm and cooperative. Patient PERRLA, oral mucosa pink and moist, strong  on both upper extremities. Lung sound clear, not appear distress. Bowel sounds active. Pedal pulses are palpable, no complain of any numbness or tingling. IV site dry and dressing intact. ABD dressing to back is clean and dry. SCD and Jose E hose are applied bilateral. Pain is 7/10. Bed is in lower position, call bell within reach. Assisted pt ambulated to the bathroom. Pt tolerated well. Pt stated that Pain is 8/10. PRN Norco is given per [facility] Side effects is educated and will continue to monitor the effectiveness. 1334:. Both Hemovac is removed, 2X2 and transparent dressings applied on both side. ABD dressing in the back is removed and Mepilex dressing applied. Pt tolerated well. IV removed at this time too. Discharge dual review with Analy BENNETT RN. Discharge instruction is reviewed with pt and family. Allowed time to ask question, pt verbalized understanding. Pain medication is given prior discharge.

## 2017-12-01 NOTE — ROUTINE PROCESS
1925  Bedside and Verbal shift change report given to Krishna Montana RN (oncoming nurse) by Joseph Fraire RN (offgoing nurse). Report included the following information SBAR, Kardex and MAR.

## 2017-12-01 NOTE — DISCHARGE SUMMARY
Discharge Summary    Patient: Alexus Manjarrez               Sex: female          DOA: 11/29/2017         YOB: 1955      Age:  63 y.o.        LOS:  LOS: 2 day                Admit Date: 11/29/2017    Discharge Date: 11/31/2017    Admission Diagnoses: FACET ARTHROSIS,LUMBAR [address]  Spinal stenosis, lumbar    Discharge Diagnoses:    Problem List as of 11/30/2017  Date Reviewed: 11/29/2017          Codes Class Noted - Resolved    Status post lumbar surgery ICD-10-CM: Z98.890  ICD-9-CM: V45.89  11/15/2017 - Present        Spondylolisthesis of lumbar region ICD-10-CM: M43.16  ICD-9-CM: 738.4  7/22/2012 - Present        DDD (degenerative disc disease), lumbar ICD-10-CM: M51.36  ICD-9-CM: 722.52  7/22/2012 - Present        * (Principal)RESOLVED: Spinal stenosis, lumbar ICD-10-CM: M48.061  ICD-9-CM: 724.02  11/15/2017 - 11/30/2017        RESOLVED: HNP (herniated nucleus pulposus), lumbar ICD-10-CM: M51.26  ICD-9-CM: 722.10  11/15/2017 - 11/30/2017        RESOLVED: Fixation hardware in spine ICD-10-CM: Z96.7  ICD-9-CM: V45.89  7/22/2012 - 7/24/2012              Discharge Condition: Good    Hospital Course: The patient underwent L3-S1 revision decompression & fusion on 11/29/2017. The patient tolerated the procedure well. Vital signs remained stable and the patient was transferred to  2nd floor surgical unit without complications. The patient remained neurovascularly intact and began getting out of bed with physical therapy on  POD #1. Pain was controlled with Dilaudid PCA and Ultram pills for break through pain, later changed to Norco.  Narcan given for somnolence on POD #1. The PCA pump and torres catheter were discontinued on POD#1. The drain was discontinued on POD#1. The patient progressed slolwy with physical therapy and was ambulating 30 feet on POD #1 and was therefore discharged the evening of POD#2.      Consults: None    Significant Diagnostic Studies:   Recent Labs      11/30/17 0254   HGB  12.2     Recent Labs      11/30/17 0254   HCT  38.3       Current Discharge Medication List      START taking these medications    Details   diazePAM (VALIUM) 2 mg tablet Take 1 Tab by mouth three (3) times daily as needed. Max Daily Amount: 6 mg. Qty: 60 Tab, Refills: 0      traMADol (ULTRAM) 50 mg tablet Take 1-2 Tabs by mouth every six (6) hours as needed. Max Daily Amount: 400 mg. Qty: 84 Tab, Refills: 0      HYDROcodone-acetaminophen (NORCO) 5-325 mg per tablet Take 1-1.5 Tabs by mouth every four (4) hours as needed. Max Daily Amount: 9 Tabs. Qty: 84 Tab, Refills: 0         CONTINUE these medications which have NOT CHANGED    Details   calcium carbonate (TUMS) 200 mg calcium (500 mg) chew Take 2 Tabs by mouth daily. losartan (COZAAR) 100 mg tablet Take 100 mg by mouth daily. Indications: hypertension      acetaminophen (TYLENOL EXTRA STRENGTH) 500 mg tablet Take 500 mg by mouth every six (6) hours as needed for Pain. Indications: TOOTHACHE      amLODIPine (NORVASC) 10 mg tablet Take 10 mg by mouth daily. Indications: hypertension      Cholecalciferol, Vitamin D3, (VITAMIN D3) 1,000 unit Cap Take  by mouth daily. multivitamin (ONE A DAY) tablet Take 1 Tab by mouth daily. STOP taking these medications       ASPIRIN/SALICYLAMIDE/CAFFEINE (BC HEADACHE POWDER PO) Comments:   Reason for Stopping:         DICLOFENAC SODIUM PO Comments:   Reason for Stopping:               Activity: activity as tolerated, weight bearing as tolerated. Wear lumbar brace when out of bed. No lifting over 20 pounds. No anti- inflammatory medications for 3 months. Use stool softeners at home while taking pain medications since  they are constipating. Diet: Regular Diet    Wound Care: Keep wound clean and dry, Reinforce dressing PRN and ice to area for comfort. Do not get wound wet for 5 days.     Follow-up: 10 days with Dr Tree Hook

## 2017-12-01 NOTE — ROUTINE PROCESS
Bedside and Verbal shift change report given to Fartun Penny RN (oncoming nurse) by Jonathan Fenton RN (offgoing nurse). Report included the following information SBAR, Kardex and MAR.

## 2017-12-01 NOTE — PROGRESS NOTES
Attempted PT treatment at this time, pt reported dizziness and wanted to rest prior to treatment session. Will f/u w/ PT treatment once pt's schedule allows.

## 2017-12-01 NOTE — PROGRESS NOTES
Problem: Falls - Risk of  Goal: *Absence of Falls  Document Uday Fall Risk and appropriate interventions in the flowsheet.    Outcome: Progressing Towards Goal  Fall Risk Interventions:  Mobility Interventions: Assess mobility with egress test, OT consult for ADLs, PT Consult for assist device competence, Utilize walker, cane, or other assitive device, Patient to call before getting OOB    Mentation Interventions: Adequate sleep, hydration, pain control    Medication Interventions: Patient to call before getting OOB    Elimination Interventions: Patient to call for help with toileting needs, Call light in reach

## 2017-12-02 ENCOUNTER — HOME CARE VISIT (OUTPATIENT)
Dept: SCHEDULING | Facility: HOME HEALTH | Age: 62
End: 2017-12-02
Payer: COMMERCIAL

## 2017-12-02 VITALS
DIASTOLIC BLOOD PRESSURE: 104 MMHG | TEMPERATURE: 98 F | HEART RATE: 92 BPM | SYSTOLIC BLOOD PRESSURE: 162 MMHG | OXYGEN SATURATION: 97 %

## 2017-12-02 PROCEDURE — G0151 HHCP-SERV OF PT,EA 15 MIN: HCPCS

## 2017-12-02 PROCEDURE — 400013 HH SOC

## 2017-12-02 NOTE — PROGRESS NOTES
Problem: Mobility Impaired (Adult and Pediatric)  Goal: *Acute Goals and Plan of Care (Insert Text)  Physical Therapy Goals LT/ST  Initiated 11/30/2017 and to be accomplished within 3-5 day(s)  1. Patient will move from supine <> sit with S in prep for out of bed activity and change of position. 2.  Patient will perform sit<> stand with S with LRAD in prep for transfers/ambulation. 3.  Patient will transfer from bed <> chair with S with LRAD for time up in chair for completion of ADL activity. 4.  Patient will ambulate 150 feet with LRAD/S for improved functional mobility/safe discharge. 5.  Patient will ascend/descend 3-5 stairs with handrail with contact guard assist for home re-entry as needed. Outcome: Progressing Towards Goal  physical Therapy TREATMENT    Patient: Hakeem Keller (26 y.o. female)  Date: 11/30/2017 (late entry)  Diagnosis: FACET ARTHROSIS,LUMBAR DISC DEGENERATION,LUMBAGO,LUMBAR STENOSIS,HYPERTENSION,OBESITY  Spinal stenosis, lumbar Spinal stenosis, lumbar  Procedure(s) (LRB):  L3-S1 REVISION DECOMPRESSION AND FUSION W/C-ARM \"SPEC POP\" (N/A) 3 Days Post-Op  Precautions: Back, Fall, Spinal  Chart, physical therapy assessment, plan of care and goals were reviewed. ASSESSMENT:  Pt progressing slowly. Only able to transfer to stand and to [facility] at this time. Dizzy upon standing only. Activity limited by same and by pain. Pt left on [facility] and nurse notified. All needs in reach. Progression toward goals:  []      Improving appropriately and progressing toward goals  [x]      Improving slowly and progressing toward goals  []      Not making progress toward goals and plan of care will be adjusted     PLAN:  Patient continues to benefit from skilled intervention to address the above impairments. Continue treatment per established plan of care.   Discharge Recommendations:  Rehab vs Home Health  Further Equipment Recommendations for Discharge:  rolling walker     SUBJECTIVE:   Patient stated I have to use the bathroom.     OBJECTIVE DATA SUMMARY:   Critical Behavior:  Neurologic State: Alert, Appropriate for age  Orientation Level: Oriented X4  Cognition: Appropriate decision making, Appropriate for age attention/concentration, Appropriate safety awareness, Follows commands  Safety/Judgement: Awareness of environment, Fall prevention  Functional Mobility Training:  Bed Mobility:  Rolling: Contact Guard Assistance, Additional time  Supine to Sit: [employer], Additional time  Scooting: Supervision, Additional time  Transfers:  Sit to Stand: Minimal assistance, Contact Guard Assistance, Additional time  Stand to Sit: [employer], Additional time  Bed to Chair: Minimum assistance, Contact guard assistance, Additional time (to [facility])  Balance:  Sitting: Intact  Standing: Impaired, With support  Standing - Static: Fair  Standing - Dynamic : Fair  Ambulation/Gait Training:  Distance (ft): 2 Feet (ft)  Assistive Device: Brace/Splint, Gait belt, Walker, rolling  Ambulation - Level of Assistance: Supervision  Gait Description (WDL): Exceptions to WDL  Gait Abnormalities: Antalgic, Decreased step clearance, Step to gait  Base of Support: Widened  Stance: Weight shift  Speed/Mary: Slow  Step Length: Left shortened, Right shortened  Swing Pattern: Left asymmetrical, Right asymmetrical  Interventions: Safety awareness training, Tactile cues, Verbal cues  Pain:  Pain Scale 1: Numeric (0 - 10)  Pain Intensity 1: 7  Pain Location 1: Back  Pain Orientation 1: Lower  Pain Description 1: Aching  Pain Intervention(s) 1: Medication (see MAR)  Activity Tolerance:   Fair   Please refer to the flowsheet for vital signs taken during this treatment.   After treatment:   [x] Patient left in no apparent distress sitting up on [facility]  [] Patient left in no apparent distress in bed  [x] Call bell left within reach  [x] Nursing notified  [] Caregiver present  [] Bed alarm activated      Pramod Bob, PT   Time Calculation: 42 mins

## 2017-12-03 ENCOUNTER — HOME CARE VISIT (OUTPATIENT)
Dept: SCHEDULING | Facility: HOME HEALTH | Age: 62
End: 2017-12-03
Payer: COMMERCIAL

## 2017-12-03 ENCOUNTER — HOME CARE VISIT (OUTPATIENT)
Dept: HOME HEALTH SERVICES | Facility: HOME HEALTH | Age: 62
End: 2017-12-03
Payer: COMMERCIAL

## 2017-12-03 PROCEDURE — G0299 HHS/HOSPICE OF RN EA 15 MIN: HCPCS

## 2017-12-04 ENCOUNTER — HOME CARE VISIT (OUTPATIENT)
Dept: HOME HEALTH SERVICES | Facility: HOME HEALTH | Age: 62
End: 2017-12-04
Payer: COMMERCIAL

## 2017-12-04 VITALS
RESPIRATION RATE: 16 BRPM | HEART RATE: 78 BPM | TEMPERATURE: 97 F | SYSTOLIC BLOOD PRESSURE: 130 MMHG | OXYGEN SATURATION: 97 % | DIASTOLIC BLOOD PRESSURE: 80 MMHG

## 2017-12-05 ENCOUNTER — HOME CARE VISIT (OUTPATIENT)
Dept: HOME HEALTH SERVICES | Facility: HOME HEALTH | Age: 62
End: 2017-12-05
Payer: COMMERCIAL

## 2017-12-07 ENCOUNTER — HOME CARE VISIT (OUTPATIENT)
Dept: HOME HEALTH SERVICES | Facility: HOME HEALTH | Age: 62
End: 2017-12-07
Payer: COMMERCIAL

## 2018-03-08 NOTE — BRIEF OP NOTE
BRIEF OPERATIVE NOTE    Date of Procedure: 11/29/2017   Preoperative Diagnosis: FACET ARTHROSIS,LUMBAR DISC DEGENERATION,LUMBAGO,LUMBAR STENOSIS,HYPERTENSION,OBESITY  Postoperative Diagnosis: FACET ARTHROSIS,LUMBAR DISC     Procedure: Procedure(s):  L3-S1 REVISION DECOMPRESSION AND FUSION W/C-ARM \"SPEC POP\"  Surgeon(s) and Role:     * Lisandra Young MD - Primary  Assistant: Mendel Lamb PA-C  Anesthesia: General   Estimated Blood Loss: 225cc  Specimens: * No specimens in log *   Findings: spinal stenosis L3-4, previous O5-N7 fusion  Complications: none  Implants:   Implant Name Type Inv.  Item Serial No.  Lot No. LRB No. Used Action   GRAFT PUTTY DBM H-GENIN 10CC --  - [id number]  GRAFT PUTTY DBM H-GENIN 10CC --  [id number] SPINEFRONTIER [id number] N/A 1 Implanted   CAP LCK CLICKX E/6-J HD TI --  - [id number]  CAP LCK CLICKX J/5-A HD TI --   [address] [id number] N/A 6 Implanted   GODWIN SPNE [facility] 6X85 --  - [id number]  GODWIN [facility] 6X85 --   [address] [id number] N/A 2 Implanted   [facility] 42-55 --  - [id number]  [facility] 42-55 --   [employer] [place] [id number] N/A 1 Implanted   SCR SPNE PEDCL PA [id number] TI -- CLICKX - [id number]  SCR SPNE PEDCL PA [id number] TI -- CLICKX  SY[employer] [place] [id number] N/A 2 Implanted   SCR SPNE PEDCL PA [id number] TI -- CLICKX - [id number]  SCR SPNE PEDCL PA [id number] TI -- CLICKX  SY[employer] [place] [id number] N/A 3 Implanted   SCR SPNE PEDCL PA [id number] TI -- CLICKX - [id number]   SCR SPNE PEDCL PA [id number] TI -- CLICKX   [employer] [place] [id number] N/A 1 Implanted Release of information faxed to [facility]  [phone].

## 2019-08-26 ENCOUNTER — OFFICE VISIT (OUTPATIENT)
Dept: SURGERY | Age: 64
End: 2019-08-26

## 2019-08-26 ENCOUNTER — HOSPITAL ENCOUNTER (OUTPATIENT)
Dept: LAB | Age: 64
Discharge: HOME | End: 2019-08-26
Payer: COMMERCIAL

## 2019-08-26 VITALS
OXYGEN SATURATION: 98 % | DIASTOLIC BLOOD PRESSURE: 78 MMHG | SYSTOLIC BLOOD PRESSURE: 128 MMHG | WEIGHT: 255 LBS | HEART RATE: 82 BPM | BODY MASS INDEX: 42.49 KG/M2 | RESPIRATION RATE: 16 BRPM | HEIGHT: 65 IN

## 2019-08-26 DIAGNOSIS — K90.9 INTESTINAL MALABSORPTION, UNSPECIFIED TYPE: Primary | ICD-10-CM

## 2019-08-26 DIAGNOSIS — K90.9 INTESTINAL MALABSORPTION, UNSPECIFIED TYPE: ICD-10-CM

## 2019-08-26 LAB
25(OH)D3 SERPL-MCNC: 25.2 NG/ML (ref 30–100)
ALBUMIN SERPL-MCNC: 3.7 G/DL (ref 3.4–5)
ALBUMIN/GLOB SERPL: 1.1 {RATIO} (ref 0.8–1.7)
ALP SERPL-CCNC: 113 U/L (ref 45–117)
ALT SERPL-CCNC: 31 U/L (ref 13–56)
ANION GAP SERPL CALC-SCNC: 5 MMOL/L (ref 3–18)
AST SERPL-CCNC: 23 U/L (ref 10–38)
BASOPHILS # BLD: 0.1 K/UL (ref 0–0.1)
BASOPHILS NFR BLD: 1 % (ref 0–2)
BILIRUB SERPL-MCNC: 0.2 MG/DL (ref 0.2–1)
BUN SERPL-MCNC: 24 MG/DL (ref 7–18)
BUN/CREAT SERPL: 28 (ref 12–20)
CALCIUM SERPL-MCNC: 9 MG/DL (ref 8.5–10.1)
CHLORIDE SERPL-SCNC: 104 MMOL/L (ref 100–111)
CO2 SERPL-SCNC: 32 MMOL/L (ref 21–32)
CREAT SERPL-MCNC: 0.87 MG/DL (ref 0.6–1.3)
DIFFERENTIAL METHOD BLD: ABNORMAL
EOSINOPHIL # BLD: 0.2 K/UL (ref 0–0.4)
EOSINOPHIL NFR BLD: 2 % (ref 0–5)
ERYTHROCYTE [DISTWIDTH] IN BLOOD BY AUTOMATED COUNT: 14.8 % (ref 11.6–14.5)
FERRITIN SERPL-MCNC: 51 NG/ML (ref 8–388)
FOLATE SERPL-MCNC: 16.8 NG/ML (ref 3.1–17.5)
GLOBULIN SER CALC-MCNC: 3.3 G/DL (ref 2–4)
GLUCOSE SERPL-MCNC: 129 MG/DL (ref 74–99)
HCT VFR BLD AUTO: 41.6 % (ref 35–45)
HGB BLD-MCNC: 13.2 G/DL (ref 12–16)
IRON SERPL-MCNC: 44 UG/DL (ref 50–175)
LYMPHOCYTES # BLD: 2.8 K/UL (ref 0.9–3.6)
LYMPHOCYTES NFR BLD: 28 % (ref 21–52)
MCH RBC QN AUTO: 27.1 PG (ref 24–34)
MCHC RBC AUTO-ENTMCNC: 31.7 G/DL (ref 31–37)
MCV RBC AUTO: 85.4 FL (ref 74–97)
MONOCYTES # BLD: 0.6 K/UL (ref 0.05–1.2)
MONOCYTES NFR BLD: 6 % (ref 3–10)
NEUTS SEG # BLD: 6.1 K/UL (ref 1.8–8)
NEUTS SEG NFR BLD: 63 % (ref 40–73)
PLATELET # BLD AUTO: 223 K/UL (ref 135–420)
PMV BLD AUTO: 10.2 FL (ref 9.2–11.8)
POTASSIUM SERPL-SCNC: 3.5 MMOL/L (ref 3.5–5.5)
PROT SERPL-MCNC: 7 G/DL (ref 6.4–8.2)
RBC # BLD AUTO: 4.87 M/UL (ref 4.2–5.3)
SODIUM SERPL-SCNC: 141 MMOL/L (ref 136–145)
VIT B12 SERPL-MCNC: >2000 PG/ML (ref 211–911)
WBC # BLD AUTO: 9.7 K/UL (ref 4.6–13.2)

## 2019-08-26 PROCEDURE — 85025 COMPLETE CBC W/AUTO DIFF WBC: CPT

## 2019-08-26 PROCEDURE — 82306 VITAMIN D 25 HYDROXY: CPT

## 2019-08-26 PROCEDURE — 80053 COMPREHEN METABOLIC PANEL: CPT

## 2019-08-26 PROCEDURE — 83540 ASSAY OF IRON: CPT

## 2019-08-26 PROCEDURE — 36415 COLL VENOUS BLD VENIPUNCTURE: CPT

## 2019-08-26 PROCEDURE — 82728 ASSAY OF FERRITIN: CPT

## 2019-08-26 PROCEDURE — 82607 VITAMIN B-12: CPT

## 2019-08-26 PROCEDURE — 84425 ASSAY OF VITAMIN B-1: CPT

## 2019-08-26 RX ORDER — DICLOFENAC SODIUM 75 MG/1
75 TABLET, DELAYED RELEASE ORAL 2 TIMES DAILY
COMMUNITY
End: 2019-11-08

## 2019-08-26 NOTE — PATIENT INSTRUCTIONS
New patient Instructions      1. Ensure all pre-operative insurances requirements are complete (ie; dietary visits, psychology consults, primary care documentation, etc)    2. Adhere to pre-operative weight loss / weight maintenance plan discussed in the office today. 3. Contact the office with any questions on pre-operative clearance issues (ie; cardiology work-up, pulmonary work-up, upper GI study, etc). 4. If a barium upper GI study has been ordered for your evaluation, make sure you are on liquids only the morning of the procedure. Walking for Exercise: Care Instructions  Your Care Instructions    Walking is one of the easiest ways to get the exercise you need for good health. A brisk, 30-minute walk each day can help you feel better and have more energy. It can help you lower your risk of disease. Walking can help you keep your bones strong and your heart healthy. Check with your doctor before you start a walking plan if you have heart problems, other health issues, or you have not been active in a long time. Follow your doctor's instructions for safe levels of exercise. Follow-up care is a key part of your treatment and safety. Be sure to make and go to all appointments, and call your doctor if you are having problems. It's also a good idea to know your test results and keep a list of the medicines you take. How can you care for yourself at home? Getting started  · Start slowly and set a short-term goal. For example, walk for 5 or 10 minutes every day. · Bit by bit, increase the amount you walk every day. Try for at least 30 minutes on most days of the week. You also may want to swim, bike, or do other activities. · If finding enough time is a problem, it is fine to be active in blocks of 10 minutes or more throughout your day and week.   · To get the heart-healthy benefits of walking, you need to walk briskly enough to increase your heart rate and breathing, but not so fast that you cannot talk comfortably. · Wear comfortable shoes that fit well and provide good support for your feet and ankles. Staying with your plan  · After you've made walking a habit, set a longer-term goal. You may want to set a goal of walking briskly for longer or walking farther. Experts say to do 2½ hours of moderate activity a week. A faster heartbeat is what defines moderate-level activity. · To stay motivated, walk with friends, coworkers, or pets. · Use a phone donald or pedometer to track your steps each day. Set a goal to increase your steps. Once you get there, set a higher goal. Aim for 10,000 steps a day. · If the weather keeps you from walking outside, go for walks at the mall with a friend. Local schools and churches may have indoor gyms where you can walk. Fitting a walk into your workday  · Park several blocks away from work, or get off the bus a few stops early. · Use the stairs instead of the elevator, at least for a few floors. · Suggest holding meetings with colleagues during a walk inside or outside the building. · Use the restroom that is the farthest from your desk or workstation. · Use your morning and afternoon breaks to take quick 15-minute walks. Staying safe  · Know your surroundings. Walk in a well-lighted, safe place. If it is dark, walk with a partner. Wear light-colored clothing. If you can, buy a vest or jacket that reflects light. · Carry a cell phone for emergencies. · Drink plenty of water. Take a water bottle with you when you walk. This is very important if it is hot out. · Be careful not to slip on wet or icy ground. You can buy \"grippers\" for your shoes to help keep you from slipping. · Pay attention to your walking surface. Use sidewalks and paths. · If you have breathing problems like asthma or COPD, ask your doctor when it is safe for you to walk outdoors. Cold, dry air, smog, pollen, or other things in the air could cause breathing problems. Where can you learn more?   Go to http://kiarra-ruy.info/. Enter R159 in the search box to learn more about \"Walking for Exercise: Care Instructions. \"  Current as of: December 7, 2017  Content Version: 11.8  © 3063-9996 [employer]. Care instructions adapted under license by [employer] (which disclaims liability or warranty for this information). If you have questions about a medical condition or this instruction, always ask your healthcare professional. [place] [address] any warranty or liability for your use of this information. Patient Instructions      1. Continue to monitor carbohydrate and protein intake- remember to keep your           total  carbohydrates to 50 grams or less per day for best results. 2. Remember hydration goals - usually 48 to 64 ounces of liquids per day  3. Continue to work towards exercise goals - minimum 3 days per week of 45          minutes to  1 hour at a time. 4. Remember to take vitamins as directed        Supplement Resource Guide    Importance of Protein:   Maintains lean body mass, produces antibodies to fight off infections, heals wounds, minimizes hair loss, helps to give you energy, helps with satiety, and keeping you full between meals. Importance of Calcium:  Needed for healthy bones and teeth, normal blood clotting, and nervous system functioning, higher risk of osteoporosis and bone disease with non-compliance. Importance of Multivitamins: Many functions. Supply you with extra nutrients that you may be missing from food. May lead to iron deficiency anemia, weakness, fatigue, and many other symptoms with non-compliance. Importance of B Vitamins:  Important for red blood cell formation, metabolism, energy, and helps to maintain a healthy nervous system. Protein Supplement  Find one you like now. Use immediately after surgery.    Look for:  35-50g protein each day from your protein supplement once you reach the progression diet. 0-3 g fat per serving  0-3 g sugar per serving    Protein drinks should be split in separate dosages. Recommend: Lifelong  1 year + Calcium Supplement:     Start taking within a month after surgery. Look for: Calcium Citrate Plus D (1500 mg per day)  Recommend: Citracal     .          Avoid chocolate chewable calcium. Can use chewable bariatric or [employer] brand or similar chewable. The body cannot absorb more than 500-600 mg @ a time. Take for Life Multi-vitamin Supplement:      1st Month After Surgery: Any complete chewable, such as: [employer]s Complete chewables. Avoid [employer] sours or gummies. They lack iron and other important nutrients and also have added sugar. Continue with chewable vitamin or change to adult complete multivitamin one month after surgery. Menstruating women can take a prenatal vitamin. Make sure has at least 18 mg iron and 445-173 mcg folic acid):    Vitamin B12, B Complex Vitamin, and Biotin  Start taking within a month after surgery. Vitamin B12:  1000 mcg of Vitamin B12 three times weekly    Must take sublingually (meaning you take it under your tongue) or in a liquid drop form for easy absorption. B Complex Vitamin: Take a pill or liquid drop form once daily. Biotin: This vitamin can help prevent hair loss.     Recommend 5mg   (5000 mcg) a day  Biotin is Optional

## 2019-08-26 NOTE — PROGRESS NOTES
Revision Surgery Consultation    Subjective: The patient is a 60 y.o. obese female with a Body mass index is 42.43 kg/m². .  The patient had a Lap gastric bypass procedure done approximatly 13 years ago in [facility] by Dr. Negro Tabares.  her starting weight prior to surgery was 300. she ultimately lost approximately 70 lbs with a subsequent weight regain of 35lbs. her peak EBWL at 2 years out from surgery was 45% and now her current EBWL is 20%. her last bariatric follow-up was many years ago with Dr. Negro Tabares. Radha Escalante notes that she had no issues in the immediate post-op phase and had no hospital readmissions in the remote post-op phase. she currently is having the following issues related to his health:Weight regain and poor weight loss initially. she is here today to discuss a possible revision of her gastric bypass because of weight regain and poor weight loss with her original surgery. All of their prior evaluations available by both their PCP's and specialists physicians have been reviewed today either in the Care Everywhere portal or scanned under the media tab. I have spent a large portion of my initial consultation today reviewing the patients current dietary habits which have contributed to their health issues, weight regain and  their current obesity. They understand that generally speaking,  weight regain is  a function of resuming less that ideal dietary habits instead of being a procedural issue. They understand that older procedures are more likely to be associated with a less that perfect procedural result, such as a prior vertical banded gastroplasty or non divided gastric bypass. These procedures are more likely to result in staple line failures with resultant weight regain. This has been explained to the patient via diagrams of these older procedures and given to the patient.     I have suggested to them personally a dietary regimen that they can initiate now to help with their status as it pertains to their weight. They understand that the most important aspect of their journey through their weight loss endeavor will be their adherence to a new lifestyle of healthy eating behavior. They also understand that an adherence to an exercise program will not only help with weight loss but is ultimately important in weight maintenance. Patient Active Problem List    Diagnosis Date Noted    Intestinal malabsorption 2019    Hypertension     Chronic pain     Arthritis     Morbid obesity with BMI of 40.0-44.9, adult ([address]     Morbid obesity ([address]     Arthritis of left knee     Status post lumbar surgery 11/15/2017    Spondylolisthesis of lumbar region 2012    DDD (degenerative disc disease), lumbar 2012    [address]bo[address] ([facility] [address] 1977      Past Surgical History:   Procedure Laterality Date    HX  SECTION      X 2    HX GASTRIC BYPASS      HX ORTHOPAEDIC  4-12    L4-L5 surgery    HX ROTATOR CUFF REPAIR Left     HX TUBAL LIGATION        Social History     Tobacco Use    Smoking status: Never Smoker    Smokeless tobacco: Never Used   Substance Use Topics    Alcohol use: Yes     Comment: rarely      Family History   Problem Relation Age of Onset    Diabetes Mother     Diabetes Sister     Diabetes Brother     Diabetes Sister     Diabetes Sister     Malignant Hyperthermia Neg Hx     Pseudocholinesterase Deficiency Neg Hx     Delayed Awakening Neg Hx     Post-op Nausea/Vomiting Neg Hx     Emergence Delirium Neg Hx     Post-op Cognitive Dysfunction Neg Hx     Other Neg Hx       Current Outpatient Medications   Medication Sig Dispense Refill    diclofenac EC (VOLTAREN) 75 mg EC tablet Take 75 mg by mouth two (2) times a day.  calcium carbonate (TUMS) 200 mg calcium (500 mg) chew Take 2 Tabs by mouth daily.  losartan (COZAAR) 100 mg tablet Take 100 mg by mouth daily.  Indications: hypertension      acetaminophen (TYLENOL EXTRA STRENGTH) 500 mg tablet Take 500 mg by mouth every six (6) hours as needed for Pain. Indications: TOOTHACHE      amLODIPine (NORVASC) 10 mg tablet Take 10 mg by mouth daily. Indications: hypertension      Cholecalciferol, Vitamin D3, (VITAMIN D3) 1,000 unit Cap Take 1,000 Units by mouth daily.  multivitamin (ONE A DAY) tablet Take 1 Tab by mouth daily.          No Known Allergies       Review of Systems:            General - No history or complaints of unexpected fever, chills, or weight loss  Head/Neck - No history or complaints of headache, diplopia, dysphagia, hearing loss  Cardiac - No history or complaints of chest pain, palpitations, murmur, or shortness of breath  Pulmonary - No history or complaints of shortness of breath, productive cough, hemoptysis  Gastrointestinal - No history or complaints of reflux,  abdominal pain, obstipation/constipation, blood per rectum  Genitourinary - No history or complaints of hematuria/dysuria, stress urinary incontinence symptoms, or renal lithiasis  Musculoskeletal - No history or complaints of joint pain or muscular weakness  Hematologic - No history or complaints of bleeding disorders, blood transfusions, sickle cell anemia  Neurologic - No history or complaints of  migraine headaches, seizure activity, syncopal episodes, TIA or stroke  Integumentary - No history or complaints of rashes, abnormal nevi, skin cancer  Gynecological - No history of heavy menses/abnormal menses           Objective:     Visit Vitals  /78   Pulse 82   Resp 16   Ht 5' 5\" (1.651 m)   Wt 115.7 kg (255 lb)   SpO2 98%   BMI 42.43 kg/m²       Physical Examination: General appearance - alert, well appearing, and in no distress and oriented to person, place, and time  Mental status - alert, oriented to person, place, and time, normal mood, behavior, speech, dress, motor activity, and thought processes  Eyes - pupils equal and reactive, extraocular eye movements intact, sclera anicteric, left eye normal, right eye normal  Ears - bilateral TM's and external ear canals normal, right ear normal  Nose - normal and patent, no erythema, discharge or polyps  Mouth - mucous membranes moist, pharynx normal without lesions  Neck - supple, no significant adenopathy  Lymphatics - no palpable lymphadenopathy, no hepatosplenomegaly  Chest - clear to auscultation, no wheezes, rales or rhonchi, symmetric air entry  Heart - normal rate, regular rhythm, normal S1, S2, no murmurs, rubs, clicks or gallops  Abdomen - soft, nontender, nondistended, no masses or organomegaly  Back exam - full range of motion, no tenderness, palpable spasm or pain on motion  Neurological - alert, oriented, normal speech, no focal findings or movement disorder noted  Musculoskeletal - no joint tenderness, deformity or swelling  Extremities - peripheral pulses normal, no pedal edema, no clubbing or cyanosis  Skin - normal coloration and turgor, no rashes, no suspicious skin lesions noted    Labs / Old Records: Old operative reports reviewed if available and are scanned under the media tab or reviewed under Care Everywhere        Assessment:     Morbid obesity status post Lap gastric bypass procedure approximately 13 years ago with complaint of weight regain. Plan: 1. Weight regain-Today in our office I had a lengthy discussion with Radha Escalante regarding the nature of their prior procedure. We discussed the anatomical changes to their anatomy and how this relates to  contributing weight regain. Our office will continue to attempt to obtain any medical records, if not obtained or available already ,related to their procedure. It was also discussed today that before any decisions can be made regarding a possible revision of their initial  procedure that an upper GI swallow study must be obtained to evaluate their post surgical anatomy.   They understand that in patients with a prior open gastric bypass, those patients are not revision candidates due to adhesive disease usually, and the fact that post surgical anatomy  usually can not be improved upon. They understand if their gastric bypass was performed laparoscopically, then this situation might be more amendable to gastric band placement over their prior gastric bypass. They understand, as I have explained today, that the adhesive disease associated with prior  procedures is at times a rate limiting factor. This precludes our ability to perform a revision procedure safely. The factors that contribute to this are increased risk such as age, health issues and increased risk from a procedural standpoint and have been discussed today. We will proceed with the UGI swallow study as described above. The patient understands all of the above and wishes to proceed with the study. 2.Nutrition-  I have discussed in detail the pitfalls in diet that have contributed to their weight regain and the importance of adhering to a lifelong regimen of dietary goals and proper eating habits. I have discussed the proper lifelong bariatric diet  in detail spending in excess of 20 minutes discussing this. We will schedule them for a dietary consultation with our nutritionist and urge them to continue on a regular follow-up schedule with her. 3.Maintenance vitamins- Today we have discussed the importance of vitamins as it pertains to their procedure and we will obtain appropriate lab to check all levels. They have been provided a handout regarding this today. Total time spent with the patient reviewing their complex history of bariatric surgery,diet, and plan is in excess of 60 minutes. Secondary Diagnoses:     Hypertension - The patient has a clear understanding of how weight loss improves hypertension as a whole, but also they understand that there is a significant genetic component to this disease process.  We will monitor the patients blood pressure while in the hospital and the plan would be to continue those medications postoperatively.  If a diuretic is being used we will stop them on discharge to prevent dehydration particularly with the sleeve gastrectomy and the gastric bypass procedures.  They will be instructed to monitor their blood pressure postoperatively while at home and notify their primary care physician in the event of any significantly high or uncharacteristic readings. Weight Related Arthritis -The patient understands the benefits that weight loss surgery can have on their arthritis but also understands that weight loss is not a guaranteed cure and relief of symptoms is often dependent on the severity of the underlying disease. The patient also understands that traditional pharmaceutical treatments for this diagnosis are usually unavailable to post-operative weight loss patients due to the effects on the gastrointestinal tract. Any changes to the patients medication treatment will ultimately be made the patients PCP with input by our office.     Signed By: Anastasia Roberts MD     August 26, 2019

## 2019-08-28 LAB — VIT B1 BLD-SCNC: 125.2 NMOL/L (ref 66.5–200)

## 2019-08-28 RX ORDER — ERGOCALCIFEROL 1.25 MG/1
CAPSULE ORAL
Qty: 52 CAP | Refills: 1 | Status: SHIPPED | OUTPATIENT
Start: 2019-08-28 | End: 2020-10-14

## 2019-08-28 NOTE — PROGRESS NOTES
Spoke with pt she is aware of what Dr Arnol Bird stated: Call in Vitamin D 50,000 units twice weekly for 1 year    Rx sent to pharmacy for pt.

## 2019-09-19 ENCOUNTER — CLINICAL SUPPORT (OUTPATIENT)
Dept: SURGERY | Age: 64
End: 2019-09-19

## 2019-09-19 VITALS — HEIGHT: 65 IN | WEIGHT: 257 LBS | BODY MASS INDEX: 42.82 KG/M2

## 2019-09-19 DIAGNOSIS — E66.01 MORBID OBESITY (HCC): Primary | ICD-10-CM

## 2019-09-19 NOTE — PROGRESS NOTES
Medical Weight Loss Multi-Disciplinary Program    Name: Karie Montero   : 1955    Session# 1  Date: 2019     Height: 5' 5\" (165.1 cm)    Weight: 116.6 kg (257 lb) lbs. Body mass index is 42.77 kg/m². Pounds Lost: 0 Pounds Gained: 2 LBS    Behavior Modification    Positive attitude  Comments: Pt is working on the following goals:    Dietitian: Alyssa Chaudhary is a 60 y.o. female who present for a pre-op evaluation. Visit Vitals  Ht 5' 5\" (1.651 m)   Wt 116.6 kg (257 lb)   BMI 42.77 kg/m²     Past Medical History:   Diagnosis Date    Arthritis     osteo-lower back    Arthritis of left knee     Chronic pain     back    Hypertension     10 years    Morbid obesity ([address]     Morbid obesity with BMI of 40.0-44.9, adult ([address]     Thromboembolus ([address]     PE- jumping rope MD's felt it was from the activity           Procedure:  Patient seeking revision of RYGB      Reasons for Surgery:  BMI > 40 with one or more medically significant comorbidities and HTN    Summary:  Pt given brief pre/post-op diet ed and diet hx reviewed. Pt instructed to follow a low calorie, low carbohydrate, high protein diet of about 7002-0213 calories daily. Pt set several goals. See below. What have you done in the past to try to lose weight? Calorie controlled diet, exercise program, previous weight loss surgery     Why didn't you lose weight or keep the weight off?: She reports with previous weight loss attempts she hit a plateau and was unable to lose past a certain point      Dietary Instructions    Nutritional Hx: What is the number of meals you eat per day? 3  Comment:     Do you eat between meals / snack? yes    How fast do you eat your meals? slow    How often do you eat fast food? occasionally    How many sodas/sugared beverages do you drink per day? None avoids sugar sweetened beverages    How many caffeinated drinks do you have per day?  Coffee- 3 cups     How much milk and/or juice do you drink per day? None     How much water do you drink per day? 3-4 (8oz glasses)    How often do you consume alcohol? occasionally; 1 Glasses of wine (1-2 times per month)     Current Vitamins: Reports taking MVI, Vitamin B12, Calcium Vitamin D    Diet History:    Typical intake is as follows:  Breakfast: 1 egg OR Oatmeal (flavored package) made with water - 2-3 cups of coffee with sweetened creamer   -Premier protein shake   - Exercise for 2 hours (9-30 am)  Lunch: 2 slices of bread with turkey, cheese, emery (2-3 oz) - OR Homemade juice (celery, beets, carrots, apple, spinach)  Snack: cheese and crackers OR peanut butter and jelly sandwich  Dinner: Thin sliced pizza - 2 pieces OR Fried chicken ( wings)   Snacks: Belvita (3-4 cracker/cookies)  Fluids: 32 oz water, 2-3 cups coffee per day,  Diet coke ( 12 oz - 1- 2 times per week)    Reviewed intake  Understanding low carbohydrates, low sugar, higher protein meals  Understanding proper portions  Dining outside home  Instruction given for personal dietary changes  Discussed perceived compliance  Comments: Pt given brief pre/post-op diet ed and diet hx reviewed. Patient Education and Materials Provided:  Supplement [facility], B Vitamin Information, MVI Recommendations, Calcium Citrate Information, Bariatric Supplement Companies, Protein Supplement Information, Fluid Requirements, No Caffeine or Carbonation, No Alcohol for One Year Post Op, 3 Balanced Meals a Day, Food Group Guide, Good Choices Dining Out, No Snacks, No Concentrated Sweets, Support System at Home, Exercising, Support Group Information and Addressed Current Habits / Changes to make     Physical Activity/Exercise    Discussed Perceived Compliance  Reasonable Goals Set  Motivation  Comments: none    Exercise:  Do you currently have an exercise routine? yes  What kind of exercise do you do? Water aerobics . For how long? 3-5 days per week For how often? 1-2 hours    Goals:   Goals:  1. Continue working to consistently eat three meals a day - using your protein shake and your protein water as a meal replacement or snack (so you can drink up to 2 a day)              - for each meal focus on                           - 3-5 ounces of protein at each meal (1 ounce = 1/4 cup, so around 3/4 - 1 cup of protein or a piece of protein the size of the palm of your hand)                          - 1-2 servings of non-starchy vegetables (1/2 - 1 cup, avoid peas, corn and potatoes those are starchy vegetables - if you do eat those measure them out                      to only 1/2 cup serving a day = 15 grams of carbohydrates)                           - Carbohydrates: 50-75 grams or less per day - any time you have a carbohydrate subtract that from your total for the day               - for snacks: make sure your snacks provide at least 100-200 calories - focusing on lean protein and fiber      2. Daily food tracking: use an donald like [employer] to track your daily intake      3. Work on meal planning/prepping - focus on midday meal and evening. 4. Continue your current exercise routine of water aerboics 3-4 days per week for 1-2 hours per day. 5. Increase non caloric fluid to 64 oz per day. Eliminate caffeine, added sugar, carbonation, and straws. - non-caloric fluid is any kind of beverage that is sugar-free/diet, decaf and non-carbonated      6.. Carbohydrates:  Pick complex carbohydrates (whole grain starches) over simple carbohydrates (white starches) and remember to measure them out, following the measurements below:  Carbohydrate foods include:  · Fruit, Grains, Starchy vegetables  One serving of a carbohydrate food = 15 grams of carbohydrate and 60-80 calories. Also, sugar and sweets in all forms (regular white sugar, brown sugar, sugar in the raw, honey, syrup, agave nectar, molasses, regular flavored coffee creamer, etc.), are carbohydrates.    You are working to eliminate added sugar in your diet prior to surgery. In general:  1 serving of  fruit = ½ cup fruit (chopped, grapes, or berries), ½ cup canned fruit in natural juice or water (discard fluid), 1 small fresh fruit, ½ banana, 2 tablespoons dried fruit, 4 oz 100% fruit juice  1 serving of starchy vegetables = ½ cup cooked (most common are potatoes of all kinds, corn, or green peas)  1 serving of grains (these vary the most so read labels and use computer/phone application if able!) =   · -Bread: 1 slice small sandwich bread, ¼ large bagel, ½ English muffin, ½ hot dog or hamburger bun, 1 small roll, 1 (6in across) tortilla  · Cereal: ¼ cup granola, ½ cup cooked oatmeal, ½ cup cooked cream of wheat, ½ cup cooked grits, ½ cup cereal   · Pasta/Rice/Quinoa/Barley: 1/3 cup cooked pasta, 1/3 cup cooked couscous, 1/3 cup cooked rice, ½ cup cooked wild rice, 1/3 cup cooked quinoa, 1/3 cup cooked barley - ALWAYS MEASURE YOUR GRAINS AFTER THEY'VE BEEN COOKED     Candidate for surgery (per RD): PENDING   Anton Zheng RD  09/19/19

## 2019-10-02 ENCOUNTER — APPOINTMENT (OUTPATIENT)
Dept: GENERAL RADIOLOGY | Age: 64
End: 2019-10-02
Attending: SPECIALIST
Payer: COMMERCIAL

## 2019-10-02 ENCOUNTER — HOSPITAL ENCOUNTER (OUTPATIENT)
Age: 64
Setting detail: OUTPATIENT SURGERY
Discharge: HOME | End: 2019-10-02
Attending: SPECIALIST | Admitting: SPECIALIST
Payer: COMMERCIAL

## 2019-10-02 VITALS
RESPIRATION RATE: 15 BRPM | HEART RATE: 61 BPM | SYSTOLIC BLOOD PRESSURE: 134 MMHG | BODY MASS INDEX: 43.28 KG/M2 | WEIGHT: 259.8 LBS | HEIGHT: 65 IN | TEMPERATURE: 96.6 F | OXYGEN SATURATION: 96 % | DIASTOLIC BLOOD PRESSURE: 93 MMHG

## 2019-10-02 DIAGNOSIS — E66.01 MORBID OBESITY (HCC): ICD-10-CM

## 2019-10-02 PROCEDURE — 77030040361 HC SLV COMPR DVT MDII -B: Performed by: SPECIALIST

## 2019-10-02 PROCEDURE — 74240 X-RAY XM UPR GI TRC 1CNTRST: CPT

## 2019-10-02 PROCEDURE — 76040000019: Performed by: SPECIALIST

## 2019-10-02 PROCEDURE — 74011000255 HC RX REV CODE- 255: Performed by: SPECIALIST

## 2019-10-02 NOTE — PROCEDURES
13 years post gastric bypass in [place] seeking revision with BMI of 40+. UGI shows normal anatomy. Plan for band over bypass.   See dictation

## 2019-10-03 NOTE — OP NOTES
[facility]  OPERATIVE REPORT    Name:  Darius Jhaveri  MR#:   [id number]  :  1955  ACCOUNT #:  [de-identified]  DATE OF SERVICE:  10/02/2019    PREOPERATIVE DIAGNOSIS:  The patient is 13 years postoperative from laparoscopic gastric bypass procedure performed in [place] with poor weight maintenance. POSTOPERATIVE DIAGNOSIS:  The patient is 13 years postoperative from laparoscopic gastric bypass procedure performed in [place] with poor weight maintenance. PROCEDURE PERFORMED:  Upper gastrointestinal study with barium. SURGEON:  Vinay Sandoval. MENDEZ Otero:  None. ANESTHESIA:  None. COMPLICATIONS:  None. SPECIMENS REMOVED:  None. IMPLANTS:  None. ESTIMATED BLOOD LOSS:  None. STATEMENT OF MEDICAL NECESSITY:  The patient is a 62-year-old female with gastric bypass procedure performed laparoscopically in [place], 13 years ago. She is now at about 20% excess body weight loss and is presenting today for possible revision surgery. PROCEDURE: The patient was brought to the fluoro unit where she was given thin barium. On swallowing barium, she was noted to have normal peristalsis of her esophagus. She had prompt filling of the distal esophagus with tapering into and through the gastroesophageal junction. Contrast then filled a very tiny gastric pouch which was vertically oriented. Contrast then flowed through the gastrojejunal anastomosis in normal fashion and the only significant finding was that the candy cane of the Hannah limb was opposite of what is considered normal with the redundant limb being medial and the Hannah limb being lateral, but there was no obstruction present and the pouch is small. We discharged her. She is not a candidate for revision, but she is a candidate for lap band placement. I have discussed with her the procedure in detail. She has been given a paper on the procedure.   She will review this and let us know if she wishes to proceed after medical weight loss plan is completed.       Sindy Mcardle, MD      [id number]  D:  10/02/2019 18:51  T:  10/03/2019 4:23  JOB #:  [id number]

## 2019-10-22 ENCOUNTER — CLINICAL SUPPORT (OUTPATIENT)
Dept: SURGERY | Age: 64
End: 2019-10-22

## 2019-10-22 VITALS — WEIGHT: 257 LBS | BODY MASS INDEX: 42.82 KG/M2 | HEIGHT: 65 IN

## 2019-10-22 DIAGNOSIS — E66.01 MORBID OBESITY (HCC): Primary | ICD-10-CM

## 2019-10-22 NOTE — PROGRESS NOTES
Medical Weight Loss Multi-Disciplinary Program    Name: Rox Oswald   : 1955    Session# 2  Date: 10/22/2019     Height: 5' 5\" (165.1 cm)    Weight: 116.6 kg (257 lb) lbs. Body mass index is 42.77 kg/m². Pounds Lost: 0 Pounds Gained: 0    Dietary Instructions    Reviewed intake  Understanding low carbohydrates, low sugar, higher protein meals  Understanding proper portions  Instruction given for personal dietary changes  Discussed perceived compliance  Comments: Today the majority of our visit was spent reviewing patient's food recall and identifying areas for improvement. Educated  on the importance of eating 3 meals/day at regularly scheduled times including breakfast within 1st 1-2 hours of waking. Suggested patient use a protein supplement as a meal replacement instead of skipping OR as a between meal high protein snack. Also educated on the importance of including a protein with every meal and snack and reviewed lean sources. Lastly introduced  the Plate Method for Meal Planning and used food models to assist with visualizing recommended serving sizes. Typical intake is as follows:  Breakfast: Protein shakes with 2 hard boiled egg   Lunch: Cucumber, red onion, celery,  1/4 cup berries and 4-5 walnuts   Snack: String cheese, OR Nabs crackers   Dinner: Chicken (fried, stir mckeon, baked) -   Snacks: Working to avoid snacking at night after dinner and in the middle of the night   Fluids: Eliminated diet soda, and juicing (wants to restart juicing vegetables), 48 oz water, 3 cups of coffee, 1 premier protein shake     Physical Activity/Exercise    Discussed Perceived Compliance  Reasonable Goals Set  Comments: none    Exercise:  Do you currently have an exercise routine? yes  What kind of exercise do you do? Water aerobics . For how long? 3-5 days per week For how often? 1-2 hours     Goals:   Goals:  1.  Continue working to consistently eat three meals a day - using your protein shake and your protein water as a meal replacement or snack (so you can drink up to 2 a day)              - for each meal focus on                           - 3-5 ounces of protein at each meal (1 ounce = 1/4 cup, so around 3/4 - 1 cup of protein or a piece of protein the size of the palm of your hand)                          - 1-2 servings of non-starchy vegetables (1/2 - 1 cup, avoid peas, corn and potatoes those are starchy vegetables - if you do eat those measure them out                      to only 1/2 cup serving a day = 15 grams of carbohydrates)                           - Carbohydrates: 50-75 grams or less per day - any time you have a carbohydrate subtract that from your total for the day               - for snacks: make sure your snacks provide at least 100-200 calories - focusing on lean protein and fiber      2. Daily food tracking: use an donald like [employer] to track your daily intake      3. Work on meal planning/prepping - focus on midday meal and evening.     4. Continue your current exercise routine of water aerboics 3-4 days per week for 1-2 hours per day.      5. Increase non caloric fluid to 64 oz per day. Eliminate caffeine, added sugar, carbonation, and straws.              - non-caloric fluid is any kind of beverage that is sugar-free/diet, decaf and non-carbonated      6. . Carbohydrates:  Pick complex carbohydrates (whole grain starches) over simple carbohydrates (white starches) and remember to measure them out, following the measurements below:  Carbohydrate foods include:  · Fruit, Grains, Starchy vegetables  One serving of a carbohydrate food = 15 grams of carbohydrate and 60-80 calories.   Also, sugar and sweets in all forms (regular white sugar, brown sugar, sugar in the raw, honey, syrup, agave nectar, molasses, regular flavored coffee creamer, etc.), are carbohydrates. You are working to eliminate added sugar in your diet prior to surgery.    In general:  1 serving of  fruit = ½ cup fruit (chopped, grapes, or berries), ½ cup canned fruit in natural juice or water (discard fluid), 1 small fresh fruit, ½ banana, 2 tablespoons dried fruit, 4 oz 100% fruit juice  1 serving of starchy vegetables = ½ cup cooked (most common are potatoes of all kinds, corn, or green peas)  1 serving of grains (these vary the most so read labels and use computer/phone application if able!) =   · -Bread: 1 slice small sandwich bread, ¼ large bagel, ½ English muffin, ½ hot dog or hamburger bun, 1 small roll, 1 (6in across) tortilla  · Cereal: ¼ cup granola, ½ cup cooked oatmeal, ½ cup cooked cream of wheat, ½ cup cooked grits, ½ cup cereal   · Pasta/Rice/Quinoa/Barley: 1/3 cup cooked pasta, 1/3 cup cooked couscous, 1/3 cup cooked rice, ½ cup cooked wild rice, 1/3 cup cooked quinoa, 1/3 cup cooked barley - ALWAYS MEASURE YOUR GRAINS AFTER THEY'VE BEEN COOKED      Candidate for surgery (per RD): PENDING   Lillie Baez RD

## 2019-10-24 ENCOUNTER — HOSPITAL ENCOUNTER (OUTPATIENT)
Dept: PREADMISSION TESTING | Age: 64
Discharge: HOME | End: 2019-10-24
Attending: ORTHOPAEDIC SURGERY
Payer: COMMERCIAL

## 2019-10-24 LAB
ABO + RH BLD: NORMAL
ALBUMIN SERPL-MCNC: 3.9 G/DL (ref 3.4–5)
ALBUMIN/GLOB SERPL: 1.2 {RATIO} (ref 0.8–1.7)
ALP SERPL-CCNC: 106 U/L (ref 45–117)
ALT SERPL-CCNC: 33 U/L (ref 13–56)
ANION GAP SERPL CALC-SCNC: 6 MMOL/L (ref 3–18)
AST SERPL-CCNC: 27 U/L (ref 10–38)
BACTERIA SPEC CULT: NORMAL
BILIRUB SERPL-MCNC: 0.3 MG/DL (ref 0.2–1)
BLOOD GROUP ANTIBODIES SERPL: NORMAL
BUN SERPL-MCNC: 20 MG/DL (ref 7–18)
BUN/CREAT SERPL: 28 (ref 12–20)
CALCIUM SERPL-MCNC: 9.1 MG/DL (ref 8.5–10.1)
CHLORIDE SERPL-SCNC: 105 MMOL/L (ref 100–111)
CO2 SERPL-SCNC: 30 MMOL/L (ref 21–32)
CREAT SERPL-MCNC: 0.72 MG/DL (ref 0.6–1.3)
ERYTHROCYTE [DISTWIDTH] IN BLOOD BY AUTOMATED COUNT: 14.2 % (ref 11.6–14.5)
GLOBULIN SER CALC-MCNC: 3.2 G/DL (ref 2–4)
GLUCOSE SERPL-MCNC: 90 MG/DL (ref 74–99)
HCT VFR BLD AUTO: 42.7 % (ref 35–45)
HGB BLD-MCNC: 13.7 G/DL (ref 12–16)
MCH RBC QN AUTO: 27.5 PG (ref 24–34)
MCHC RBC AUTO-ENTMCNC: 32.1 G/DL (ref 31–37)
MCV RBC AUTO: 85.6 FL (ref 74–97)
PLATELET # BLD AUTO: 228 K/UL (ref 135–420)
PMV BLD AUTO: 10.6 FL (ref 9.2–11.8)
POTASSIUM SERPL-SCNC: 4.2 MMOL/L (ref 3.5–5.5)
PROT SERPL-MCNC: 7.1 G/DL (ref 6.4–8.2)
RBC # BLD AUTO: 4.99 M/UL (ref 4.2–5.3)
SERVICE CMNT-IMP: NORMAL
SODIUM SERPL-SCNC: 141 MMOL/L (ref 136–145)
SPECIMEN EXP DATE BLD: NORMAL
WBC # BLD AUTO: 7.8 K/UL (ref 4.6–13.2)

## 2019-10-24 PROCEDURE — 85027 COMPLETE CBC AUTOMATED: CPT

## 2019-10-24 PROCEDURE — 87641 MR-STAPH DNA AMP PROBE: CPT

## 2019-10-24 PROCEDURE — 80053 COMPREHEN METABOLIC PANEL: CPT

## 2019-10-24 PROCEDURE — 36415 COLL VENOUS BLD VENIPUNCTURE: CPT

## 2019-10-24 PROCEDURE — 86900 BLOOD TYPING SEROLOGIC ABO: CPT

## 2019-10-24 PROCEDURE — 93005 ELECTROCARDIOGRAM TRACING: CPT

## 2019-10-24 NOTE — H&P
Patient Name:  Jerardo Mejia   YOB: 1955      Chief Complaint:  Left knee pain and osteoarthritis     History of Chief Complaint:  Ms. Dani Anderson is A 60 y.o female being seen for left knee pain and osteoarthritis . She has been having some problems with the left knee. She had a corticosteroid injection in the left knee that seemed to help for a short period of time. She says this is what is giving her the most problems and most pain. Status post L3 to S1 revision decompression and fusion done 11/29/17. Past Medical/Surgical History:    Disease/Disorder Date Side Surgery Date Side Comment   Hypertension            Arthroscopy shoulder 2011 left       Gastric bypass 2003        Spinal fusion, lumbar 04/03/2012  DL 03/21/2019 -L4-5      Spinal fusion, lumbar 11/29/2017  DL 03/21/2019 -L3-S1 revision      Spinal fusion, lumbar 07/23/2012  DL 03/21/2019 -extension fusion, L4-S1     Allergies:  No known allergies. Ingredient Reaction Medication Name Comment   NO KNOWN ALLERGIES        Current Medications:    Medication Directions   AMLODIPINE BESYLATE    LOSARTAN POTASSIUM    diclofenac sodium 75 mg tablet,delayed release take 1 tablet by oral route 2 times every day with food   multivitamin tablet    vitamin E    Omega 3-6-9    Vitamin B-12    Vitamin D3      Social History:      SMOKING  Status Tobacco Type Units Per Day Yrs Used   Former smoker Cigarette       ALCOHOL  There is a history of alcohol use. Type: Wine. consumed rarely. Family History:    Disease Detail Family Member Age Cause of Death Comments   Family history of Hypertension   N    Family history of Diabetes mellitus   N      Review of Systems:    Pertinent positives include joint pain, joint stiffness and numbness/tingling.   Pertinent negatives include blurred vision, chest pain, chills, cold, discharge of the eyes, dizziness, double vision, fever, headache, hearing loss, heart murmur, itching of the eyes, palpitations, redness of the eyes, rheumatic fever, ringing in ears, sore throat/hoarseness, weight change, abdominal pain, anxiety, bipolar disorder, bladder/kidney infection, bloody stool, blood in urine, burning sensation, changes in mood, chronic cough, depression, diarrhea, difficulty breathing, difficulty swallowing, fainting, frequent urinating, fracture/dislocation, gas/bloating, gout, hemorrhoids, incontinence, loss of balance, memory loss, muscle weakness, nausea/vomiting, pain on breathing, painful urination, psoriasis, rash/itching, Raynaud's phenomenon, rheumatoid disease, seizure disorder, shortness of breath, sprain/strain, swelling of feet, tendonitis, varicose veins and wheezing. Physical Examination:    General:  The patient appears healthy. Cardiovascular:  Arterial pulses are normal.  Skin:  The skin is normal appearing with no contusions or wounds noted. Heart- RRR  Lungs-CTA elysia  Abdomen- +BS,soft,nontender  Musculoskeletal:  The left knee appears normal and has no effusion. There is tenderness around the medial tibial joint line and patellofemoral crepitus with varus deformity. Otherwise, the knee demonstrates normal movement and no medial or lateral instability. The quadriceps tendon of the leg is not tender on palpation. The knee has no anterior or posterior drawer signs. Results of the Robert and apprehension tests of the knee are negative. There is tenderness around the right PSIS. The thoracolumbar spine has normal kyphosis, a normal appearance, and no scoliosis. There is full range of motion of the thoracic and lumbar spine and of the hips and ankles. Straight leg raising and crossed straight leg raising tests are negative. The cervical spine has a normal appearance, no tenderness to palpation, and no spasm of the paracervical muscle. There is no tenderness on palpation of the trapezius muscle.   Flexion, extension, and right and left rotation of the cervical spine are normal.  Examination of the shoulder shows no warmth, no deformity, and no muscle atrophy. There is no tenderness on palpation of the subacromial bursa, the glenohumeral joint region, or the bicipital groove. Range of motion of the shoulder is normal in abduction, forward flexion, extension, and in internal and external rotation. No pain is elicited by motion of the shoulder or by impingement testing. No instability of the shoulder is noted. She has positive Tinel's sign in the left hand. Neurological:    There is no weakness in the thoracic, lumbar, or sacral spine or in the lower extremities or hips. Deep tendon reflexes are present and normal bilaterally. Sensory and motor examination of the cervical spine elicited no tactile dysesthesia or hyperesthesia and demonstrated normal motor strength of the upper extremities. Shoulder strength is normal in flexion, abduction, adduction, and internal rotation. Reflexes and peripheral nerves are intact. Normal reflexes are present in the biceps, brachioradialis, and triceps. There is no weakness in the fingers. Gait and stance are normal.  Knee and ankle jerks are normal with no clonus. Radiograph Examination:  AP, lateral, bilateral oblique, flexion and extension views of the lumbar spine were obtained and interpreted in the office 7/25/19  and reveal well-healed L3 to S1 fusion. AP, lateral, bilateral oblique, flexion and extension views of the cervical spine were obtained and interpreted in the office today and reveal C3 to C7 degenerative disc disease. An AP view of the pelvis was obtained and interpreted in the office today and reveals no periosteal reaction, no medullary lesions, no osteopenia, well-aligned joint spaces, no chondrolysis, and no fractures. Review of her left knee x-rays done reveals severe lateral osteoarthritis with bone-on-bone changes. Impression:   Ms. Ok Slade has left-sided knee pain and osteoarthritis.     Plan:We discussed treatments for the condition including surgical intervention, the risks, and benefits as well as the different surgical approaches and decision making. We also discussed nonsurgical care for this condition including medications, injections, physical therapy, rehabilitation, activity modification, and brace utilization. At this point, she would like to proceed with operative intervention. We will plan for left total knee arthroplasty at her earliest convenience. The risks and benefits include infection, bleeding, deep venous thrombosis, pulmonary embolism, heart attack, stroke, death, arthrofibrosis, need for manipulation, neurovascular compromise, need for revision surgical intervention, persistent long-term pain, need for chronic pain management, and metallic allergies.

## 2019-10-25 LAB
ATRIAL RATE: 63 BPM
CALCULATED P AXIS, ECG09: 60 DEGREES
CALCULATED R AXIS, ECG10: 33 DEGREES
CALCULATED T AXIS, ECG11: 73 DEGREES
DIAGNOSIS, 93000: NORMAL
P-R INTERVAL, ECG05: 162 MS
Q-T INTERVAL, ECG07: 456 MS
QRS DURATION, ECG06: 86 MS
QTC CALCULATION (BEZET), ECG08: 466 MS
VENTRICULAR RATE, ECG03: 63 BPM

## 2019-10-29 PROBLEM — M17.12 PRIMARY OSTEOARTHRITIS OF LEFT KNEE: Status: ACTIVE | Noted: 2019-10-29

## 2019-11-06 ENCOUNTER — ANESTHESIA EVENT (OUTPATIENT)
Dept: SURGERY | Age: 64
DRG: 470 | End: 2019-11-06
Payer: COMMERCIAL

## 2019-11-06 ENCOUNTER — ANESTHESIA (OUTPATIENT)
Dept: SURGERY | Age: 64
DRG: 470 | End: 2019-11-06
Payer: COMMERCIAL

## 2019-11-06 ENCOUNTER — HOSPITAL ENCOUNTER (INPATIENT)
Age: 64
LOS: 2 days | Discharge: HOME HEALTH | DRG: 470 | End: 2019-11-08
Attending: ORTHOPAEDIC SURGERY | Admitting: ORTHOPAEDIC SURGERY
Payer: COMMERCIAL

## 2019-11-06 DIAGNOSIS — M17.12 PRIMARY OSTEOARTHRITIS OF LEFT KNEE: Primary | ICD-10-CM

## 2019-11-06 PROCEDURE — 97116 GAIT TRAINING THERAPY: CPT

## 2019-11-06 PROCEDURE — 74011000250 HC RX REV CODE- 250: Performed by: ORTHOPAEDIC SURGERY

## 2019-11-06 PROCEDURE — 76010000153 HC OR TIME 1.5 TO 2 HR: Performed by: ORTHOPAEDIC SURGERY

## 2019-11-06 PROCEDURE — 74011250636 HC RX REV CODE- 250/636: Performed by: ORTHOPAEDIC SURGERY

## 2019-11-06 PROCEDURE — 77030038010: Performed by: ORTHOPAEDIC SURGERY

## 2019-11-06 PROCEDURE — 74011000258 HC RX REV CODE- 258: Performed by: PHYSICIAN ASSISTANT

## 2019-11-06 PROCEDURE — 76942 ECHO GUIDE FOR BIOPSY: CPT | Performed by: ORTHOPAEDIC SURGERY

## 2019-11-06 PROCEDURE — C1776 JOINT DEVICE (IMPLANTABLE): HCPCS | Performed by: ORTHOPAEDIC SURGERY

## 2019-11-06 PROCEDURE — 77030012508 HC MSK AIRWY LMA AMBU -A: Performed by: SPECIALIST

## 2019-11-06 PROCEDURE — 77030020813 HC INST SCULP CEM KT DISP S&N -B: Performed by: ORTHOPAEDIC SURGERY

## 2019-11-06 PROCEDURE — 77030028925 HC PIN/DRL SET HUM S&N -C: Performed by: ORTHOPAEDIC SURGERY

## 2019-11-06 PROCEDURE — 77030012406 HC DRN WND PENRS BARD -A: Performed by: ORTHOPAEDIC SURGERY

## 2019-11-06 PROCEDURE — 77030020782 HC GWN BAIR PAWS FLX 3M -B: Performed by: ORTHOPAEDIC SURGERY

## 2019-11-06 PROCEDURE — 76210000001 HC OR PH I REC 2.5 TO 3 HR: Performed by: ORTHOPAEDIC SURGERY

## 2019-11-06 PROCEDURE — 74011250637 HC RX REV CODE- 250/637: Performed by: PHYSICIAN ASSISTANT

## 2019-11-06 PROCEDURE — 74011250637 HC RX REV CODE- 250/637: Performed by: SPECIALIST

## 2019-11-06 PROCEDURE — 77030016060 HC NDL NRV BLK TELE -A: Performed by: SPECIALIST

## 2019-11-06 PROCEDURE — 64450 NJX AA&/STRD OTHER PN/BRANCH: CPT | Performed by: SPECIALIST

## 2019-11-06 PROCEDURE — 77030003028 HC SUT VCRL J&J -A: Performed by: ORTHOPAEDIC SURGERY

## 2019-11-06 PROCEDURE — 77030010785: Performed by: ORTHOPAEDIC SURGERY

## 2019-11-06 PROCEDURE — 77010033678 HC OXYGEN DAILY

## 2019-11-06 PROCEDURE — 97162 PT EVAL MOD COMPLEX 30 MIN: CPT

## 2019-11-06 PROCEDURE — 77030040361 HC SLV COMPR DVT MDII -B: Performed by: ORTHOPAEDIC SURGERY

## 2019-11-06 PROCEDURE — C1713 ANCHOR/SCREW BN/BN,TIS/BN: HCPCS | Performed by: ORTHOPAEDIC SURGERY

## 2019-11-06 PROCEDURE — L1830 KO IMMOB CANVAS LONG PRE OTS: HCPCS | Performed by: ORTHOPAEDIC SURGERY

## 2019-11-06 PROCEDURE — 74011250636 HC RX REV CODE- 250/636: Performed by: PHYSICIAN ASSISTANT

## 2019-11-06 PROCEDURE — 77030000032 HC CUF TRNQT ZIMM -B: Performed by: ORTHOPAEDIC SURGERY

## 2019-11-06 PROCEDURE — 77030018836 HC SOL IRR NACL ICUM -A: Performed by: ORTHOPAEDIC SURGERY

## 2019-11-06 PROCEDURE — 77030008462 HC STPLR SKN PROX J&J -A: Performed by: ORTHOPAEDIC SURGERY

## 2019-11-06 PROCEDURE — 74011250636 HC RX REV CODE- 250/636: Performed by: SPECIALIST

## 2019-11-06 PROCEDURE — 74011000250 HC RX REV CODE- 250: Performed by: PHYSICIAN ASSISTANT

## 2019-11-06 PROCEDURE — 77030013708 HC HNDPC SUC IRR PULS STRY –B: Performed by: ORTHOPAEDIC SURGERY

## 2019-11-06 PROCEDURE — 0SRD0J9 REPLACEMENT OF LEFT KNEE JOINT WITH SYNTHETIC SUBSTITUTE, CEMENTED, OPEN APPROACH: ICD-10-PCS | Performed by: ORTHOPAEDIC SURGERY

## 2019-11-06 PROCEDURE — 74011000250 HC RX REV CODE- 250: Performed by: SPECIALIST

## 2019-11-06 PROCEDURE — 65270000029 HC RM PRIVATE

## 2019-11-06 PROCEDURE — 76060000034 HC ANESTHESIA 1.5 TO 2 HR: Performed by: ORTHOPAEDIC SURGERY

## 2019-11-06 PROCEDURE — 74011250636 HC RX REV CODE- 250/636: Performed by: ANESTHESIOLOGY

## 2019-11-06 PROCEDURE — 77030027138 HC INCENT SPIROMETER -A: Performed by: ORTHOPAEDIC SURGERY

## 2019-11-06 DEVICE — CEMENT BNE 40GM FULL DOSE PMMA W/ GENT M VISC RADPQ FAST: Type: IMPLANTABLE DEVICE | Site: KNEE | Status: FUNCTIONAL

## 2019-11-06 DEVICE — GENESIS II OXINIUM FEMORAL SIZE 5 LEFT
Type: IMPLANTABLE DEVICE | Site: KNEE | Status: FUNCTIONAL
Brand: GENESIS II

## 2019-11-06 DEVICE — LEGION HIGHLY CROSS LINKED                                    POLYETHYLENE DISHED INSERT SIZE 3-4 9MM
Type: IMPLANTABLE DEVICE | Site: KNEE | Status: FUNCTIONAL
Brand: LEGION

## 2019-11-06 DEVICE — GENESIS II RESURFACING PATELLAR                                    PROSTHESIS  32MM
Type: IMPLANTABLE DEVICE | Site: KNEE | Status: FUNCTIONAL
Brand: GENESIS II

## 2019-11-06 DEVICE — IMPLANTABLE DEVICE: Type: IMPLANTABLE DEVICE | Site: KNEE | Status: FUNCTIONAL

## 2019-11-06 DEVICE — GENESIS II NON-POROUS TIBIAL                                    BASEPLATE SIZE 4 LEFT
Type: IMPLANTABLE DEVICE | Site: KNEE | Status: FUNCTIONAL
Brand: GENESIS II

## 2019-11-06 RX ORDER — AMOXICILLIN 250 MG
1 CAPSULE ORAL 2 TIMES DAILY
Status: DISCONTINUED | OUTPATIENT
Start: 2019-11-06 | End: 2019-11-08 | Stop reason: HOSPADM

## 2019-11-06 RX ORDER — OXYCODONE AND ACETAMINOPHEN 5; 325 MG/1; MG/1
1 TABLET ORAL
Status: DISCONTINUED | OUTPATIENT
Start: 2019-11-06 | End: 2019-11-07

## 2019-11-06 RX ORDER — SODIUM CHLORIDE, SODIUM LACTATE, POTASSIUM CHLORIDE, CALCIUM CHLORIDE 600; 310; 30; 20 MG/100ML; MG/100ML; MG/100ML; MG/100ML
125 INJECTION, SOLUTION INTRAVENOUS CONTINUOUS
Status: DISCONTINUED | OUTPATIENT
Start: 2019-11-06 | End: 2019-11-08 | Stop reason: HOSPADM

## 2019-11-06 RX ORDER — NALOXONE HYDROCHLORIDE 0.4 MG/ML
0.4 INJECTION, SOLUTION INTRAMUSCULAR; INTRAVENOUS; SUBCUTANEOUS AS NEEDED
Status: DISCONTINUED | OUTPATIENT
Start: 2019-11-06 | End: 2019-11-08 | Stop reason: HOSPADM

## 2019-11-06 RX ORDER — ZOLPIDEM TARTRATE 5 MG/1
5 TABLET ORAL
Status: DISCONTINUED | OUTPATIENT
Start: 2019-11-06 | End: 2019-11-08 | Stop reason: HOSPADM

## 2019-11-06 RX ORDER — SODIUM CHLORIDE 0.9 % (FLUSH) 0.9 %
5-40 SYRINGE (ML) INJECTION AS NEEDED
Status: DISCONTINUED | OUTPATIENT
Start: 2019-11-06 | End: 2019-11-08 | Stop reason: HOSPADM

## 2019-11-06 RX ORDER — DIPHENHYDRAMINE HCL 25 MG
25 CAPSULE ORAL
Status: DISCONTINUED | OUTPATIENT
Start: 2019-11-06 | End: 2019-11-08 | Stop reason: HOSPADM

## 2019-11-06 RX ORDER — ONDANSETRON 2 MG/ML
4 INJECTION INTRAMUSCULAR; INTRAVENOUS ONCE
Status: DISCONTINUED | OUTPATIENT
Start: 2019-11-06 | End: 2019-11-06 | Stop reason: HOSPADM

## 2019-11-06 RX ORDER — LUBIPROSTONE 24 UG/1
24 CAPSULE, GELATIN COATED ORAL 2 TIMES DAILY WITH MEALS
Status: DISCONTINUED | OUTPATIENT
Start: 2019-11-06 | End: 2019-11-08 | Stop reason: HOSPADM

## 2019-11-06 RX ORDER — CEFAZOLIN SODIUM/WATER 2 G/20 ML
2 SYRINGE (ML) INTRAVENOUS ONCE
Status: COMPLETED | OUTPATIENT
Start: 2019-11-06 | End: 2019-11-06

## 2019-11-06 RX ORDER — FENTANYL CITRATE 50 UG/ML
25 INJECTION, SOLUTION INTRAMUSCULAR; INTRAVENOUS
Status: COMPLETED | OUTPATIENT
Start: 2019-11-06 | End: 2019-11-06

## 2019-11-06 RX ORDER — ACETAMINOPHEN 500 MG
1000 TABLET ORAL
Status: COMPLETED | OUTPATIENT
Start: 2019-11-06 | End: 2019-11-06

## 2019-11-06 RX ORDER — HYDRALAZINE HYDROCHLORIDE 20 MG/ML
10 INJECTION INTRAMUSCULAR; INTRAVENOUS ONCE
Status: COMPLETED | OUTPATIENT
Start: 2019-11-06 | End: 2019-11-06

## 2019-11-06 RX ORDER — PROPOFOL 10 MG/ML
INJECTION, EMULSION INTRAVENOUS AS NEEDED
Status: DISCONTINUED | OUTPATIENT
Start: 2019-11-06 | End: 2019-11-06 | Stop reason: HOSPADM

## 2019-11-06 RX ORDER — ACETAMINOPHEN 325 MG/1
650 TABLET ORAL
Status: DISCONTINUED | OUTPATIENT
Start: 2019-11-06 | End: 2019-11-08 | Stop reason: HOSPADM

## 2019-11-06 RX ORDER — ONDANSETRON 2 MG/ML
INJECTION INTRAMUSCULAR; INTRAVENOUS AS NEEDED
Status: DISCONTINUED | OUTPATIENT
Start: 2019-11-06 | End: 2019-11-06 | Stop reason: HOSPADM

## 2019-11-06 RX ORDER — CEFAZOLIN SODIUM/WATER 2 G/20 ML
2 SYRINGE (ML) INTRAVENOUS EVERY 8 HOURS
Status: COMPLETED | OUTPATIENT
Start: 2019-11-06 | End: 2019-11-07

## 2019-11-06 RX ORDER — ENOXAPARIN SODIUM 100 MG/ML
30 INJECTION SUBCUTANEOUS EVERY 12 HOURS
Status: DISCONTINUED | OUTPATIENT
Start: 2019-11-07 | End: 2019-11-08 | Stop reason: HOSPADM

## 2019-11-06 RX ORDER — HYDROMORPHONE HYDROCHLORIDE 2 MG/ML
INJECTION, SOLUTION INTRAMUSCULAR; INTRAVENOUS; SUBCUTANEOUS AS NEEDED
Status: DISCONTINUED | OUTPATIENT
Start: 2019-11-06 | End: 2019-11-06 | Stop reason: HOSPADM

## 2019-11-06 RX ORDER — DEXAMETHASONE SODIUM PHOSPHATE 4 MG/ML
INJECTION, SOLUTION INTRA-ARTICULAR; INTRALESIONAL; INTRAMUSCULAR; INTRAVENOUS; SOFT TISSUE AS NEEDED
Status: DISCONTINUED | OUTPATIENT
Start: 2019-11-06 | End: 2019-11-06 | Stop reason: HOSPADM

## 2019-11-06 RX ORDER — MIDAZOLAM HYDROCHLORIDE 1 MG/ML
INJECTION, SOLUTION INTRAMUSCULAR; INTRAVENOUS AS NEEDED
Status: DISCONTINUED | OUTPATIENT
Start: 2019-11-06 | End: 2019-11-06 | Stop reason: HOSPADM

## 2019-11-06 RX ORDER — LABETALOL HYDROCHLORIDE 5 MG/ML
10 INJECTION, SOLUTION INTRAVENOUS
Status: COMPLETED | OUTPATIENT
Start: 2019-11-06 | End: 2019-11-06

## 2019-11-06 RX ORDER — KETOROLAC TROMETHAMINE 15 MG/ML
15 INJECTION, SOLUTION INTRAMUSCULAR; INTRAVENOUS
Status: DISPENSED | OUTPATIENT
Start: 2019-11-06 | End: 2019-11-07

## 2019-11-06 RX ORDER — KETAMINE HYDROCHLORIDE 10 MG/ML
INJECTION, SOLUTION INTRAMUSCULAR; INTRAVENOUS AS NEEDED
Status: DISCONTINUED | OUTPATIENT
Start: 2019-11-06 | End: 2019-11-06 | Stop reason: HOSPADM

## 2019-11-06 RX ORDER — LIDOCAINE HYDROCHLORIDE 20 MG/ML
INJECTION, SOLUTION EPIDURAL; INFILTRATION; INTRACAUDAL; PERINEURAL AS NEEDED
Status: DISCONTINUED | OUTPATIENT
Start: 2019-11-06 | End: 2019-11-06 | Stop reason: HOSPADM

## 2019-11-06 RX ORDER — LABETALOL HCL 20 MG/4 ML
SYRINGE (ML) INTRAVENOUS AS NEEDED
Status: DISCONTINUED | OUTPATIENT
Start: 2019-11-06 | End: 2019-11-06 | Stop reason: HOSPADM

## 2019-11-06 RX ORDER — HYDROMORPHONE HYDROCHLORIDE 2 MG/ML
0.5 INJECTION, SOLUTION INTRAMUSCULAR; INTRAVENOUS; SUBCUTANEOUS
Status: DISCONTINUED | OUTPATIENT
Start: 2019-11-06 | End: 2019-11-06 | Stop reason: HOSPADM

## 2019-11-06 RX ORDER — FACIAL-BODY WIPES
10 EACH TOPICAL DAILY PRN
Status: DISCONTINUED | OUTPATIENT
Start: 2019-11-06 | End: 2019-11-08 | Stop reason: HOSPADM

## 2019-11-06 RX ORDER — FENTANYL CITRATE 50 UG/ML
INJECTION, SOLUTION INTRAMUSCULAR; INTRAVENOUS AS NEEDED
Status: DISCONTINUED | OUTPATIENT
Start: 2019-11-06 | End: 2019-11-06 | Stop reason: HOSPADM

## 2019-11-06 RX ORDER — LANOLIN ALCOHOL/MO/W.PET/CERES
1 CREAM (GRAM) TOPICAL 2 TIMES DAILY WITH MEALS
Status: DISCONTINUED | OUTPATIENT
Start: 2019-11-06 | End: 2019-11-08 | Stop reason: HOSPADM

## 2019-11-06 RX ORDER — AMLODIPINE BESYLATE 5 MG/1
10 TABLET ORAL DAILY
Status: DISCONTINUED | OUTPATIENT
Start: 2019-11-07 | End: 2019-11-08

## 2019-11-06 RX ORDER — NALOXONE HYDROCHLORIDE 0.4 MG/ML
0.1 INJECTION, SOLUTION INTRAMUSCULAR; INTRAVENOUS; SUBCUTANEOUS
Status: DISCONTINUED | OUTPATIENT
Start: 2019-11-06 | End: 2019-11-06 | Stop reason: HOSPADM

## 2019-11-06 RX ORDER — ROPIVACAINE HYDROCHLORIDE 5 MG/ML
INJECTION, SOLUTION EPIDURAL; INFILTRATION; PERINEURAL AS NEEDED
Status: DISCONTINUED | OUTPATIENT
Start: 2019-11-06 | End: 2019-11-06 | Stop reason: HOSPADM

## 2019-11-06 RX ORDER — SODIUM CHLORIDE, SODIUM LACTATE, POTASSIUM CHLORIDE, CALCIUM CHLORIDE 600; 310; 30; 20 MG/100ML; MG/100ML; MG/100ML; MG/100ML
50 INJECTION, SOLUTION INTRAVENOUS CONTINUOUS
Status: DISCONTINUED | OUTPATIENT
Start: 2019-11-06 | End: 2019-11-06 | Stop reason: HOSPADM

## 2019-11-06 RX ORDER — OXYCODONE AND ACETAMINOPHEN 10; 325 MG/1; MG/1
1 TABLET ORAL
Status: DISCONTINUED | OUTPATIENT
Start: 2019-11-06 | End: 2019-11-07

## 2019-11-06 RX ORDER — CELECOXIB 100 MG/1
200 CAPSULE ORAL
Status: COMPLETED | OUTPATIENT
Start: 2019-11-06 | End: 2019-11-06

## 2019-11-06 RX ORDER — ONDANSETRON 4 MG/1
4 TABLET, ORALLY DISINTEGRATING ORAL
Status: DISCONTINUED | OUTPATIENT
Start: 2019-11-06 | End: 2019-11-08 | Stop reason: HOSPADM

## 2019-11-06 RX ORDER — SODIUM CHLORIDE 0.9 % (FLUSH) 0.9 %
5-40 SYRINGE (ML) INJECTION EVERY 8 HOURS
Status: DISCONTINUED | OUTPATIENT
Start: 2019-11-06 | End: 2019-11-08 | Stop reason: HOSPADM

## 2019-11-06 RX ORDER — HYDROMORPHONE HYDROCHLORIDE 1 MG/ML
1 INJECTION, SOLUTION INTRAMUSCULAR; INTRAVENOUS; SUBCUTANEOUS
Status: DISCONTINUED | OUTPATIENT
Start: 2019-11-06 | End: 2019-11-08

## 2019-11-06 RX ORDER — DEXMEDETOMIDINE HYDROCHLORIDE 4 UG/ML
INJECTION, SOLUTION INTRAVENOUS AS NEEDED
Status: DISCONTINUED | OUTPATIENT
Start: 2019-11-06 | End: 2019-11-06 | Stop reason: HOSPADM

## 2019-11-06 RX ORDER — PREGABALIN 50 MG/1
50 CAPSULE ORAL
Status: COMPLETED | OUTPATIENT
Start: 2019-11-06 | End: 2019-11-06

## 2019-11-06 RX ORDER — LOSARTAN POTASSIUM 50 MG/1
100 TABLET ORAL DAILY
Status: DISCONTINUED | OUTPATIENT
Start: 2019-11-07 | End: 2019-11-08

## 2019-11-06 RX ADMIN — LABETALOL 20 MG/4 ML (5 MG/ML) INTRAVENOUS SYRINGE 10 MG: at 15:24

## 2019-11-06 RX ADMIN — LUBIPROSTONE 24 MCG: 24 CAPSULE, GELATIN COATED ORAL at 18:13

## 2019-11-06 RX ADMIN — FERROUS SULFATE TAB 325 MG (65 MG ELEMENTAL FE) 325 MG: 325 (65 FE) TAB at 18:14

## 2019-11-06 RX ADMIN — LABETALOL 20 MG/4 ML (5 MG/ML) INTRAVENOUS SYRINGE 10 MG: at 15:14

## 2019-11-06 RX ADMIN — ONDANSETRON HYDROCHLORIDE 4 MG: 2 INJECTION INTRAMUSCULAR; INTRAVENOUS at 11:17

## 2019-11-06 RX ADMIN — PREGABALIN 50 MG: 50 CAPSULE ORAL at 09:26

## 2019-11-06 RX ADMIN — FENTANYL CITRATE 25 MCG: 50 INJECTION, SOLUTION INTRAMUSCULAR; INTRAVENOUS at 11:54

## 2019-11-06 RX ADMIN — FENTANYL CITRATE 25 MCG: 50 INJECTION INTRAMUSCULAR; INTRAVENOUS at 14:09

## 2019-11-06 RX ADMIN — Medication 2 G: at 18:12

## 2019-11-06 RX ADMIN — MIDAZOLAM 2 MG: 1 INJECTION INTRAMUSCULAR; INTRAVENOUS at 10:06

## 2019-11-06 RX ADMIN — HYDROMORPHONE HYDROCHLORIDE 0.5 MG: 2 INJECTION, SOLUTION INTRAMUSCULAR; INTRAVENOUS; SUBCUTANEOUS at 12:29

## 2019-11-06 RX ADMIN — LABETALOL 20 MG/4 ML (5 MG/ML) INTRAVENOUS SYRINGE 10 MG: at 15:06

## 2019-11-06 RX ADMIN — ROPIVACAINE HYDROCHLORIDE 25 ML: 5 INJECTION, SOLUTION EPIDURAL; INFILTRATION; PERINEURAL at 10:11

## 2019-11-06 RX ADMIN — ACETAMINOPHEN 1000 MG: 500 TABLET ORAL at 09:26

## 2019-11-06 RX ADMIN — SODIUM CHLORIDE, SODIUM LACTATE, POTASSIUM CHLORIDE, AND CALCIUM CHLORIDE 125 ML/HR: 600; 310; 30; 20 INJECTION, SOLUTION INTRAVENOUS at 08:23

## 2019-11-06 RX ADMIN — DEXMEDETOMIDINE HYDROCHLORIDE 5 MCG: 4 INJECTION, SOLUTION INTRAVENOUS at 11:38

## 2019-11-06 RX ADMIN — PROPOFOL 40 MG: 10 INJECTION, EMULSION INTRAVENOUS at 12:26

## 2019-11-06 RX ADMIN — HYDROMORPHONE HYDROCHLORIDE 0.5 MG: 2 INJECTION, SOLUTION INTRAMUSCULAR; INTRAVENOUS; SUBCUTANEOUS at 12:12

## 2019-11-06 RX ADMIN — DEXMEDETOMIDINE HYDROCHLORIDE 5 MCG: 4 INJECTION, SOLUTION INTRAVENOUS at 11:34

## 2019-11-06 RX ADMIN — DEXMEDETOMIDINE HYDROCHLORIDE 5 MCG: 4 INJECTION, SOLUTION INTRAVENOUS at 11:19

## 2019-11-06 RX ADMIN — KETAMINE HYDROCHLORIDE 20 MG: 10 INJECTION, SOLUTION INTRAMUSCULAR; INTRAVENOUS at 11:05

## 2019-11-06 RX ADMIN — DEXMEDETOMIDINE HYDROCHLORIDE 5 MCG: 4 INJECTION, SOLUTION INTRAVENOUS at 11:43

## 2019-11-06 RX ADMIN — KETAMINE HYDROCHLORIDE 20 MG: 10 INJECTION, SOLUTION INTRAMUSCULAR; INTRAVENOUS at 11:09

## 2019-11-06 RX ADMIN — HYDROMORPHONE HYDROCHLORIDE 0.5 MG: 2 INJECTION INTRAMUSCULAR; INTRAVENOUS; SUBCUTANEOUS at 13:18

## 2019-11-06 RX ADMIN — HYDROMORPHONE HYDROCHLORIDE 0.5 MG: 2 INJECTION INTRAMUSCULAR; INTRAVENOUS; SUBCUTANEOUS at 12:56

## 2019-11-06 RX ADMIN — ENOXAPARIN SODIUM 30 MG: 30 INJECTION SUBCUTANEOUS at 23:23

## 2019-11-06 RX ADMIN — ONDANSETRON HYDROCHLORIDE 4 MG: 2 INJECTION INTRAMUSCULAR; INTRAVENOUS at 11:28

## 2019-11-06 RX ADMIN — SODIUM CHLORIDE, SODIUM LACTATE, POTASSIUM CHLORIDE, AND CALCIUM CHLORIDE 125 ML/HR: 600; 310; 30; 20 INJECTION, SOLUTION INTRAVENOUS at 14:43

## 2019-11-06 RX ADMIN — PROPOFOL 40 MG: 10 INJECTION, EMULSION INTRAVENOUS at 12:24

## 2019-11-06 RX ADMIN — HYDROMORPHONE HYDROCHLORIDE 0.5 MG: 2 INJECTION INTRAMUSCULAR; INTRAVENOUS; SUBCUTANEOUS at 13:06

## 2019-11-06 RX ADMIN — SENNOSIDES, DOCUSATE SODIUM 1 TABLET: 50; 8.6 TABLET, FILM COATED ORAL at 22:00

## 2019-11-06 RX ADMIN — PROPOFOL 200 MG: 10 INJECTION, EMULSION INTRAVENOUS at 11:06

## 2019-11-06 RX ADMIN — HYDRALAZINE HYDROCHLORIDE 10 MG: 20 INJECTION INTRAMUSCULAR; INTRAVENOUS at 14:08

## 2019-11-06 RX ADMIN — FENTANYL CITRATE 25 MCG: 50 INJECTION INTRAMUSCULAR; INTRAVENOUS at 13:49

## 2019-11-06 RX ADMIN — HYDRALAZINE HYDROCHLORIDE 10 MG: 20 INJECTION INTRAMUSCULAR; INTRAVENOUS at 13:37

## 2019-11-06 RX ADMIN — SODIUM CHLORIDE, SODIUM LACTATE, POTASSIUM CHLORIDE, AND CALCIUM CHLORIDE: 600; 310; 30; 20 INJECTION, SOLUTION INTRAVENOUS at 12:32

## 2019-11-06 RX ADMIN — CELECOXIB 200 MG: 100 CAPSULE ORAL at 09:26

## 2019-11-06 RX ADMIN — ONDANSETRON 4 MG: 4 TABLET, ORALLY DISINTEGRATING ORAL at 16:54

## 2019-11-06 RX ADMIN — TRANEXAMIC ACID 1 G: 100 INJECTION, SOLUTION INTRAVENOUS at 11:09

## 2019-11-06 RX ADMIN — DEXMEDETOMIDINE HYDROCHLORIDE 5 MCG: 4 INJECTION, SOLUTION INTRAVENOUS at 11:51

## 2019-11-06 RX ADMIN — FENTANYL CITRATE 25 MCG: 50 INJECTION, SOLUTION INTRAMUSCULAR; INTRAVENOUS at 11:05

## 2019-11-06 RX ADMIN — LABETALOL 20 MG/4 ML (5 MG/ML) INTRAVENOUS SYRINGE 5 MG: at 12:23

## 2019-11-06 RX ADMIN — LIDOCAINE HYDROCHLORIDE 60 MG: 20 INJECTION, SOLUTION EPIDURAL; INFILTRATION; INTRACAUDAL; PERINEURAL at 11:06

## 2019-11-06 RX ADMIN — FENTANYL CITRATE 25 MCG: 50 INJECTION INTRAMUSCULAR; INTRAVENOUS at 13:59

## 2019-11-06 RX ADMIN — SODIUM CHLORIDE, SODIUM LACTATE, POTASSIUM CHLORIDE, AND CALCIUM CHLORIDE: 600; 310; 30; 20 INJECTION, SOLUTION INTRAVENOUS at 11:37

## 2019-11-06 RX ADMIN — Medication 2 G: at 11:06

## 2019-11-06 RX ADMIN — DEXMEDETOMIDINE HYDROCHLORIDE 5 MCG: 4 INJECTION, SOLUTION INTRAVENOUS at 11:25

## 2019-11-06 RX ADMIN — FENTANYL CITRATE 25 MCG: 50 INJECTION INTRAMUSCULAR; INTRAVENOUS at 14:43

## 2019-11-06 RX ADMIN — FENTANYL CITRATE 25 MCG: 50 INJECTION, SOLUTION INTRAMUSCULAR; INTRAVENOUS at 11:27

## 2019-11-06 RX ADMIN — OXYCODONE HYDROCHLORIDE AND ACETAMINOPHEN 1 TABLET: 10; 325 TABLET ORAL at 18:14

## 2019-11-06 RX ADMIN — OXYCODONE HYDROCHLORIDE AND ACETAMINOPHEN 1 TABLET: 10; 325 TABLET ORAL at 23:23

## 2019-11-06 RX ADMIN — SODIUM CHLORIDE, SODIUM LACTATE, POTASSIUM CHLORIDE, AND CALCIUM CHLORIDE 1000 ML: 600; 310; 30; 20 INJECTION, SOLUTION INTRAVENOUS at 17:07

## 2019-11-06 RX ADMIN — LABETALOL 20 MG/4 ML (5 MG/ML) INTRAVENOUS SYRINGE 7.5 MG: at 11:54

## 2019-11-06 RX ADMIN — KETAMINE HYDROCHLORIDE 10 MG: 10 INJECTION, SOLUTION INTRAMUSCULAR; INTRAVENOUS at 11:15

## 2019-11-06 RX ADMIN — KETOROLAC TROMETHAMINE 15 MG: 15 INJECTION, SOLUTION INTRAMUSCULAR; INTRAVENOUS at 16:55

## 2019-11-06 RX ADMIN — DEXAMETHASONE SODIUM PHOSPHATE 4 MG: 4 INJECTION, SOLUTION INTRAMUSCULAR; INTRAVENOUS at 11:17

## 2019-11-06 RX ADMIN — FENTANYL CITRATE 25 MCG: 50 INJECTION, SOLUTION INTRAMUSCULAR; INTRAVENOUS at 11:35

## 2019-11-06 NOTE — PROGRESS NOTES
Problem: Mobility Impaired (Adult and Pediatric)  Goal: *Acute Goals and Plan of Care (Insert Text)  Description  In 1-7 days pt will be able to perform:  ST.  Bed mobility:  Rolling L to R to L modified independent for positioning. 2.  Supine to sit to supine S with HR for meals. 3.  Sit to stand to sit S with RW in prep for ambulation. LT.  Gait:  Ambulate >150ft S with RW, WBAT, for home/community mobility. 2.  Stair Negotiation:  Ascend/descend >4 steps CGA with HR for home entry. 3.  Activity tolerance: Tolerate up in chair 1-2 hours for ADLs. 4.  Patient/Family Education:  Patient/family to be independent with HEP for follow-up care and safe discharge. Note:   PHYSICAL THERAPY EVALUATION    Patient: Richard Hedrick (88 y.o. female)  Date: 2019  Primary Diagnosis: Osteoarthritis of left knee [M17.12]  Procedure(s) (LRB):  LEFT TOTAL KNEE ARTHROPLASTY **SPEC POP** (Left) Day of Surgery   Precautions:   Fall, WBAT    ASSESSMENT :  Based on the objective data described below, the patient presents with decreased functional mobility and independence in regard to bed mobility, transfers, gt quality and tolerance, L knee AROM, L knee strength, pain, balance, activity tolerance, stair negotiation and safety due to recent L TKA surgery. Pt very drowsy but able to intermittently follow commands and attend to tasks. Pt rating pain on numerical pain scale 9/10. Pt required additional time and CGA/min A for supine<>sit<>stand. Pt required vc for safe techniques. Pt able to participate in gt training w/ RW, L KI, WBAT, GB and min A w/ antalgic pattern. Pt able to void on bedside commode. Pt returned to sitting EOB to eat meal w/ all needs within reach. Pt c/o nausea at end of session but cont to want to try and eat. Nurse James Curtis aware. Recommend [facility] d/c. Hugo Rodriguez Patient will benefit from skilled intervention to address the above impairments.   Patients rehabilitation potential is considered to be Fair  Factors which may influence rehabilitation potential include:   ? None noted  ? Mental ability/status  ? Medical condition  ? Home/family situation and support systems  ? Safety awareness  ? Pain tolerance/management  ? Other:      PLAN :  Recommendations and Planned Interventions:  ?           Bed Mobility Training             ? Neuromuscular Re-Education  ? Transfer Training                   ? Orthotic/Prosthetic Training  ? Gait Training                          ? Modalities  ? Therapeutic Exercises          ? Edema Management/Control  ? Therapeutic Activities            ? Patient and Family Training/Education  ? Other (comment):    Frequency/Duration: Patient will be followed by physical therapy twice daily to address goals. Discharge Recommendations: Home Health  Further Equipment Recommendations for Discharge: N/A     SUBJECTIVE:   Patient stated I do need to go to the bathroom.     OBJECTIVE DATA SUMMARY:     Past Medical History:   Diagnosis Date    Arthritis     osteo-lower back    Arthritis of left knee     Chronic pain     back    Hypertension     10 years    Morbid obesity ([address]     Morbid obesity with BMI of 40.0-44.9, adult ([facility] [address]     Thromboembolus ([facility] [address]     PE- jumping teena ZAMORA's felt it was from the activity     Past Surgical History:   Procedure Laterality Date    HX  SECTION      X 2    HX GASTRIC BYPASS  2005    HX ORTHOPAEDIC  4-12    L4-L5 surgery    HX ROTATOR CUFF REPAIR Left     HX TUBAL LIGATION       Barriers to Learning/Limitations: None  Compensate with: visual, verbal, tactile, kinesthetic cues/model  Prior Level of Function/Home Situation:   Home Situation  Home Environment: Private residence  # Steps to Enter: 1  One/Two Story Residence: Two story  # of Interior Steps: 13  Interior Rails: Left  Lift Chair Available: No  Living Alone: No  Support Systems: Spouse/Significant Other/Partner  Patient Expects to be Discharged to[de-identified] Private residence  Current DME Used/Available at Home: joanna Holloway  Critical Behavior:  Neurologic State: Drowsy  Orientation Level: Oriented X4  Cognition: Follows commands  Safety/Judgement: Awareness of environment  Psychosocial  Patient Behaviors: Calm; Cooperative  Purposeful Interaction: Yes  Pt Identified Daily Priority: Clinical issues (comment)  Caritas Process: Nurture loving kindness  Caring Interventions: Reassure  Reassure: Informing; Therapeutic listening  Skin Integrity: Incision (comment)(L knee)  Skin Integumentary  Skin Integrity: Incision (comment)(L knee)  Strength:    Strength: Generally decreased, functional  Tone & Sensation:   Tone: Normal  Sensation: Intact  Range Of Motion:  AROM: Generally decreased, functional  Functional Mobility:  Bed Mobility:  Supine to Sit: Contact guard assistance;Minimum assistance; Additional time(vc)  Scooting: Contact guard assistance; Additional time(vc)  Transfers:  Sit to Stand: Minimum assistance(vc)  Stand to Sit: Minimum assistance(vc)  Balance:   Sitting: Intact  Standing: Impaired; With support  Standing - Static: Fair;Constant support  Standing - Dynamic : Fair;Constant support  Ambulation/Gait Training:  Distance (ft): 10 Feet (ft)  Assistive Device: Walker, rolling;Gait belt  Ambulation - Level of Assistance: Minimal assistance; Additional time(vc)  Gait Abnormalities: Antalgic;Decreased step clearance; Step to gait  Left Side Weight Bearing: As tolerated  Base of Support: Shift to right  Stance: Weight shift;Left decreased  Speed/Mary: Slow  Step Length: Left shortened;Right shortened  Swing Pattern: Left asymmetrical;Right asymmetrical  Interventions: Safety awareness training; Tactile cues; Verbal cues; Visual/Demos  Therapeutic Exercises:   HEP written copy issued to pt per MD protocol.    Pain:  Pain Scale 1: Numeric (0 - 10)  Pain Intensity 1: 9  Pain Location 1: Knee  Pain Orientation 1: Left  Pain Description 1: Aching  Pain Intervention(s) 1: Medication (see MAR)  Activity Tolerance:   Fair   Please refer to the flowsheet for vital signs taken during this treatment. After treatment:   ?         Patient left in no apparent distress sitting up EOB  ? Patient left in no apparent distress in bed  ? Call bell left within reach  ? Nursing notified  ? Caregiver present  ? Bed alarm activated    COMMUNICATION/EDUCATION:   ?         Fall prevention education was provided and the patient/caregiver indicated understanding. ? Patient/family have participated as able in goal setting and plan of care. ?         Patient/family agree to work toward stated goals and plan of care. ?         Patient understands intent and goals of therapy, but is neutral about his/her participation. ? Patient is unable to participate in goal setting and plan of care.     Thank you for this referral.  Jun Roa, PT   Time Calculation: 28 mins       Eval Complexity: History: HIGH Complexity :3+ comorbidities / personal factors will impact the outcome/ POC Exam:MEDIUM Complexity : 3 Standardized tests and measures addressing body structure, function, activity limitation and / or participation in recreation  Presentation: MEDIUM Complexity : Evolving with changing characteristics  Clinical Decision Making:Medium Complexity    Overall Complexity:MEDIUM

## 2019-11-06 NOTE — PERIOP NOTES
TRANSFER - OUT REPORT:    Verbal report given to [facility], RN(name) on Neida Cornea  being transferred to (unit) for routine post - op       Report consisted of patients Situation, Background, Assessment and   Recommendations(SBAR). Information from the following report(s) SBAR, Kardex, OR Summary, Procedure Summary, Intake/Output and MAR was reviewed with the receiving nurse. Lines:   Peripheral IV 11/06/19 Posterior;Right Hand (Active)   Site Assessment Clean, dry, & intact 11/6/2019  1:00 PM   Phlebitis Assessment 0 11/6/2019  1:00 PM   Infiltration Assessment 0 11/6/2019  1:00 PM   Dressing Status Clean, dry, & intact 11/6/2019  1:00 PM   Dressing Type Transparent;Tape 11/6/2019  1:00 PM   Hub Color/Line Status Infusing 11/6/2019  1:00 PM        Opportunity for questions and clarification was provided.       Patient transported with:   Registered Nurse  Tech

## 2019-11-06 NOTE — ANESTHESIA POSTPROCEDURE EVALUATION
Post-Anesthesia Evaluation and Assessment    Cardiovascular Function/Vital Signs  Visit Vitals  /83   Pulse 92   Temp 36.8 °C (98.2 °F)   Resp 14   Ht 5' 5\" (1.651 m)   Wt 117.3 kg (258 lb 8 oz)   SpO2 97%   BMI 43.02 kg/m²       Patient is status post Procedure(s):  LEFT TOTAL KNEE ARTHROPLASTY **SPEC POP**. Nausea/Vomiting: Controlled. Postoperative hydration reviewed and adequate. Pain:  Pain Scale 1: FLACC (11/06/19 1352)  Pain Intensity 1: 4 (11/06/19 1352)   Managed. Neurological Status:   Neuro (WDL): Exceptions to WDL (11/06/19 1240)   At baseline. Mental Status and Level of Consciousness: Baseline and appropriate for discharge. Pulmonary Status:   O2 Device: Nasal cannula (11/06/19 1454)   Adequate oxygenation and airway patent. Complications related to anesthesia: None    Post-anesthesia assessment completed. No concerns. Patient has met all discharge requirements.     Signed By: Brandin Palacios MD    November 6, 2019

## 2019-11-06 NOTE — PERIOP NOTES
Verbal hand off at the bedside with Delpihne Rojo RN provided opportunity for questions. Family member updated and informed of room assignment #[id number].

## 2019-11-06 NOTE — OP NOTES
OPERATIVE NOTE    Patient: Yanick Fierro MRN: [id number]  SSN: [id number]    YOB: 1955  Age: 60 y.o. Sex: female      Indications: This is a 60y.o. year-old female who presents with left knee pain. X-rays have revealed significant OA. The patient was admitted for surgery as conservative measures have failed. Date of Procedure: 11/6/2019     Preoperative Diagnosis: OBESITY, LEFT KNEE OSTEOARTHRITIS, LEFT KNEE PAIN, HYPERTENSION, HISTORY OF GASTRIC BYPASS (15 YEARS AGO)    Postoperative Diagnosis: OBESITY, LEFT KNEE OSTEOARTHRITIS, LEFT KNEE PAIN, HYPERTENSION, HISTORY OF GASTRIC BYPASS (15 YEARS AGO)      Procedure: Procedure(s):  LEFT TOTAL KNEE ARTHROPLASTY **SPEC POP**    Surgeon(s) and Role:     Teri Hood MD - Primary    Assistant: Thierry Watt PA-C    OR Assitance: Physician Assistant: LILA Vaz    Anesthesia: @ORANEST    Estimated Blood Loss: 50cc    Tourniquet Time: 45min    Specimens: * No specimens in log *     Implants:   Implant Name Type Inv. Item Serial No.  Lot No. LRB No. Used Action   CEMENT BNE MED VISCO 40GM --  - [id number]  CEMENT BNE MED VISCO 40GM --   [employer] [id number] Left 2 Implanted   PAT BCNVX UHMWPE 32MM -- MAEVE II - [id number]  PAT BCNVX UHMWPE 32MM -- MAEVE II  [employer] [id number] Left 1 Implanted   INSERT LGN XLPE [facility] SZ3-4 9MM --  - [id number]  INSERT LGN XLPE [facility] SZ3-4 9MM --   [employer] [id number] Left 1 Implanted   BASEPLT FEM BA NP 4 LT -- MAEVE II - [id number]  BASEPLT FEM BA NP 4 LT -- MAEVE II  [employer] [id number] Left 1 Implanted   FEM OXIN NP MAEVE II 5 LT --  - [id number]  FEM OXIN NP MAEVE II 5 LT --   [employer] [id number] Left 1 Implanted       Complications: None; patient tolerated the procedure well. Procedure:  The patient was greeted by Anesthesia and taken to the operative suite, where the patient underwent general endotracheal anesthesia. Tourniquet was placed on the upper thigh. The leg was sterilely prepped and draped in a standard fashion. The leg was exsanguinated with an Esmarch bandage and tourniquet was elevated to 350mmHg. An incision was carried out centered over the patella, extending two fingerbreadth's over the patella and to the level of the tibial tubercle. A medial parapatellar approach was utilized. The knee was taken into flexion. The medial and lateral meniscus, as well as the anterior cruciate ligament were resected. The posterior cruciate ligament was preserved. The intramedullary canal was opened with a drill and the intramedulary guide was placed with the distal femoral cutting block. This was subsequently pinned and an oscillating saw was ultilized to create an anterior cut. The femur had previously sized to a size 5. A size 5 four-in-one cutting block was utilized to make the appropriate bone cuts. A size 5 femoral cruciate retaining trial was placed and found to be an excellent fit. The knee was taken into hyperflexion. Retractors were positioned to protect the soft tissue and neurovascular structures. The extramedullary guide with tibial cutting block was subsequently placed. It was subsequently pinned and an oscillating saw was utilized to create a proximal tibial cut 4mm below the most worn surface. A size 4 tibial baseplate was found to be an excellent fit. A 9mm trial polyethylene was placed and the knee was taken into extension, 10mm of the articular surface of the patella was resected with an oscillating saw and a 32 mm asymmetric patella trial was placed. The knee was taken through range of motion. With a no-thumb technique, there was no subluxation or dislocation of the patella. The knee ranged from 0 degrees of extension to 125 degrees of flexion by gravity. There was no instability with varus valgus stress testing with a 9mm polyethylene. The femur was drilled.   The tibia was punched. The tissues were irrigated with 1000ml of sterile saline with Bacitracin utilizing pulsatile lavage. Next, the [facility] oxinium size 5 cruciate retaining femur, a size4 tibial baseplate, with a 9 tibial insert and a 32mm all polyethelene symmetric patella were placed with methylacrylate bonding. After all cement had hardened, the excess cement was removed,  the knee was taken through a range of motion from 0 degrees of extension to 125+ degrees of flexion by gravity. There was no instability throughout range of motion and the patella tracked without subluxation. The tissues were irrigated a second time with 500ml of sterile saline with Bacitracin utilizing pulsatile lavage. A drain was then placed. The capsule was re-approximated with figure-of-eight #1 Vicryl suture. The skin edges were re approximated with 2-0 Vicryl in a subcutaneous fashion and the skin was closed with staples  A soft sterile dressing,  Ace wrap and knee immobilizer were applied. .  The patient was transferred to the postanesthesia care unit in stable condition.

## 2019-11-06 NOTE — PROGRESS NOTES
[id number] - Patient arrives to unit at this time. Admission completed at this time. Patient is drowsy. IV to right hand intact and patent. Plexis bilateral and pilar applied right leg. Elastic dressing to left leg CDI. Denies numbness/tingling. Pedal pulses palpable. Pain 9/10. Fall risk band on patient. Patient was oriented to the room to include use of call bell, meal ordering, and use of incentive spirometer patient educated on IS. Patient was given explanation of \" up for dinner\" program and has verbalized understanding. Bed in lowest position. Phone and call bell left within reach. Patient educated on need to call for assistance before getting OOB. Plan of care for the day addressed with patient. Educated on pain medication availability and possible side effects. Hemovac charged. Sleepy kept on oxygen 2L/min. Ambulating frequently to void.

## 2019-11-06 NOTE — INTERVAL H&P NOTE
H&P Update:  Rhett Castillo was seen and examined. History and physical has been reviewed. The patient has been examined.  There have been no significant clinical changes since the completion of the originally dated History and Physical.

## 2019-11-06 NOTE — ANESTHESIA PROCEDURE NOTES
Peripheral Block    Start time: 11/6/2019 10:06 AM  End time: 11/6/2019 10:11 AM  Performed by: Etta Franklin MD  Authorized by: Etta Franklin MD       Pre-procedure: Indications: at surgeon's request and post-op pain management    Preanesthetic Checklist: patient identified, risks and benefits discussed, site marked, timeout performed, anesthesia consent given and patient being monitored    Timeout Time: 10:06          Block Type:   Block Type:   Adductor canal  Laterality:  Left  Monitoring:  Standard ASA monitoring, continuous pulse ox, frequent vital sign checks, heart rate, oxygen and responsive to questions  Injection Technique:  Single shot  Procedures: ultrasound guided    Patient Position: supine  Prep: chlorhexidine    Location:  Mid thigh  Needle Type:  Stimuplex  Needle Gauge:  20 G  Needle Localization:  Ultrasound guidance    Assessment:  Number of attempts:  1  Injection Assessment:  Incremental injection every 5 mL, local visualized surrounding nerve on ultrasound, negative aspiration for blood, no intravascular symptoms, no paresthesia and ultrasound image on chart  Patient tolerance:  Patient tolerated the procedure well with no immediate complications

## 2019-11-06 NOTE — PERIOP NOTES
Reviewed PTA medication list with patient/caregiver and patient/caregiver denies any additional medications. Patient admits to having a responsible adult care for them for at least 24 hours after surgery.     Dual skin assessment completed by Sherri PETERSEN and Monica Gallardo RN.

## 2019-11-07 PROBLEM — M17.12 PRIMARY OSTEOARTHRITIS OF LEFT KNEE: Status: RESOLVED | Noted: 2019-10-29 | Resolved: 2019-11-07

## 2019-11-07 PROBLEM — M17.12 OSTEOARTHRITIS OF LEFT KNEE: Status: RESOLVED | Noted: 2019-11-06 | Resolved: 2019-11-07

## 2019-11-07 LAB
HCT VFR BLD AUTO: 37.9 % (ref 35–45)
HGB BLD-MCNC: 12.3 G/DL (ref 12–16)

## 2019-11-07 PROCEDURE — 36415 COLL VENOUS BLD VENIPUNCTURE: CPT

## 2019-11-07 PROCEDURE — 97116 GAIT TRAINING THERAPY: CPT

## 2019-11-07 PROCEDURE — 85018 HEMOGLOBIN: CPT

## 2019-11-07 PROCEDURE — 97166 OT EVAL MOD COMPLEX 45 MIN: CPT

## 2019-11-07 PROCEDURE — 97530 THERAPEUTIC ACTIVITIES: CPT

## 2019-11-07 PROCEDURE — 65270000029 HC RM PRIVATE

## 2019-11-07 PROCEDURE — 77030037875 HC DRSG MEPILEX <16IN BORD MOLN -A

## 2019-11-07 PROCEDURE — 74011250636 HC RX REV CODE- 250/636: Performed by: PHYSICIAN ASSISTANT

## 2019-11-07 PROCEDURE — 74011250637 HC RX REV CODE- 250/637: Performed by: PHYSICIAN ASSISTANT

## 2019-11-07 RX ORDER — HYDROCODONE BITARTRATE AND ACETAMINOPHEN 7.5; 325 MG/1; MG/1
1 TABLET ORAL
Status: DISCONTINUED | OUTPATIENT
Start: 2019-11-07 | End: 2019-11-08 | Stop reason: HOSPADM

## 2019-11-07 RX ORDER — NALOXONE HYDROCHLORIDE 4 MG/.1ML
SPRAY NASAL
Qty: 1 EACH | Refills: 0 | Status: SHIPPED | OUTPATIENT
Start: 2019-11-07 | End: 2020-10-26 | Stop reason: ALTCHOICE

## 2019-11-07 RX ORDER — HYDROCODONE BITARTRATE AND ACETAMINOPHEN 5; 325 MG/1; MG/1
1 TABLET ORAL
Status: DISCONTINUED | OUTPATIENT
Start: 2019-11-07 | End: 2019-11-08 | Stop reason: HOSPADM

## 2019-11-07 RX ORDER — HYDROCODONE BITARTRATE AND ACETAMINOPHEN 5; 325 MG/1; MG/1
1-1.5 TABLET ORAL
Qty: 84 TAB | Refills: 0 | Status: SHIPPED | OUTPATIENT
Start: 2019-11-07 | End: 2019-11-17

## 2019-11-07 RX ORDER — ASPIRIN 325 MG
TABLET ORAL
Qty: 120 TAB | Refills: 1 | Status: SHIPPED | OUTPATIENT
Start: 2019-11-07 | End: 2020-10-26 | Stop reason: ALTCHOICE

## 2019-11-07 RX ORDER — OXYCODONE AND ACETAMINOPHEN 5; 325 MG/1; MG/1
1-2 TABLET ORAL
Qty: 84 TAB | Refills: 0 | Status: SHIPPED | OUTPATIENT
Start: 2019-11-07 | End: 2019-11-07

## 2019-11-07 RX ADMIN — DIPHENHYDRAMINE HYDROCHLORIDE 25 MG: 25 CAPSULE ORAL at 02:27

## 2019-11-07 RX ADMIN — HYDROCODONE BITARTRATE AND ACETAMINOPHEN 1 TABLET: 7.5; 325 TABLET ORAL at 19:36

## 2019-11-07 RX ADMIN — Medication 2 G: at 01:17

## 2019-11-07 RX ADMIN — LUBIPROSTONE 24 MCG: 24 CAPSULE, GELATIN COATED ORAL at 17:09

## 2019-11-07 RX ADMIN — ACETAMINOPHEN 650 MG: 325 TABLET ORAL at 06:23

## 2019-11-07 RX ADMIN — SENNOSIDES, DOCUSATE SODIUM 1 TABLET: 50; 8.6 TABLET, FILM COATED ORAL at 20:22

## 2019-11-07 RX ADMIN — KETOROLAC TROMETHAMINE 15 MG: 15 INJECTION, SOLUTION INTRAMUSCULAR; INTRAVENOUS at 14:35

## 2019-11-07 RX ADMIN — ENOXAPARIN SODIUM 30 MG: 30 INJECTION SUBCUTANEOUS at 11:17

## 2019-11-07 RX ADMIN — DIPHENHYDRAMINE HYDROCHLORIDE 25 MG: 25 CAPSULE ORAL at 20:22

## 2019-11-07 RX ADMIN — FERROUS SULFATE TAB 325 MG (65 MG ELEMENTAL FE) 325 MG: 325 (65 FE) TAB at 17:09

## 2019-11-07 RX ADMIN — AMLODIPINE BESYLATE 10 MG: 5 TABLET ORAL at 08:40

## 2019-11-07 RX ADMIN — OXYCODONE HYDROCHLORIDE AND ACETAMINOPHEN 1 TABLET: 10; 325 TABLET ORAL at 02:50

## 2019-11-07 RX ADMIN — ENOXAPARIN SODIUM 30 MG: 30 INJECTION SUBCUTANEOUS at 23:05

## 2019-11-07 RX ADMIN — HYDROCODONE BITARTRATE AND ACETAMINOPHEN 1 TABLET: 7.5; 325 TABLET ORAL at 15:37

## 2019-11-07 RX ADMIN — OXYCODONE HYDROCHLORIDE AND ACETAMINOPHEN 1 TABLET: 10; 325 TABLET ORAL at 06:54

## 2019-11-07 RX ADMIN — Medication 10 ML: at 22:26

## 2019-11-07 RX ADMIN — FERROUS SULFATE TAB 325 MG (65 MG ELEMENTAL FE) 325 MG: 325 (65 FE) TAB at 08:39

## 2019-11-07 RX ADMIN — HYDROCODONE BITARTRATE AND ACETAMINOPHEN 1 TABLET: 7.5; 325 TABLET ORAL at 11:16

## 2019-11-07 RX ADMIN — DIPHENHYDRAMINE HYDROCHLORIDE 25 MG: 25 CAPSULE ORAL at 06:23

## 2019-11-07 RX ADMIN — LOSARTAN POTASSIUM 100 MG: 50 TABLET ORAL at 08:39

## 2019-11-07 RX ADMIN — KETOROLAC TROMETHAMINE 15 MG: 15 INJECTION, SOLUTION INTRAMUSCULAR; INTRAVENOUS at 02:50

## 2019-11-07 RX ADMIN — SENNOSIDES, DOCUSATE SODIUM 1 TABLET: 50; 8.6 TABLET, FILM COATED ORAL at 08:40

## 2019-11-07 RX ADMIN — KETOROLAC TROMETHAMINE 15 MG: 15 INJECTION, SOLUTION INTRAMUSCULAR; INTRAVENOUS at 09:41

## 2019-11-07 RX ADMIN — LUBIPROSTONE 24 MCG: 24 CAPSULE, GELATIN COATED ORAL at 08:39

## 2019-11-07 NOTE — PROGRESS NOTES
1920: Bedside report received from Marissa Nation RN. Assumed care of pt at this time. Pt in bed with no signs of distress. Pt left with call light within reach and encouraged to call for assistance. 2100: Head to toe assessment completed at this time  Patient is A&OX4. Pt denies N/V, chest pain, SOB or distress. Pt is calm and cooperative. Capillary refill less than 3 seconds. Skin is warm and dry with elastic bandage dressing on left knee and is clean, dry and intact. Patient has Hemovac to the left knee and is patent and draining. Lungs are clear bilaterally. Patient instructed on use and reason for incentive spirometer and able to do 1000 ml. Bowel sounds are active. Abdomen is soft and non-tender. YUE on RLE & PLEXI in place bilaterally. Positive dorsalis pedis pulse, sensation, warm. 18 g needle in the right posterior hand and is clean, dry and intact. Pain scale explained to patient. Reasons for taking PRN meds explained to patient. Patient instructed to call for prn when needed. Pain level is 7. Patient was oriented to call bell and bed function. Will continue to monitor. Shift summary: Patient would complain of high level of pain. Given Percocet 10 mg as needed and patient would be given relief for 2-3 hours however would wake up with high pain numbers and also high blood pressures. Patient complains of itching/tingling in toes however has good palpable and present pulses. Patient ambulated with assistance using walker. Patient voiding without issue. Patient reminded on use of Incentive Spirometer.

## 2019-11-07 NOTE — ROUTINE PROCESS
Bedside and Verbal shift change report given to Silvia Ambrose RN by Silvia Ambrose RN. Report included the following information SBAR, Kardex, OR Summary, Intake/Output and MAR.

## 2019-11-07 NOTE — PROGRESS NOTES
Occupational Therapy Evaluation/Treatment Attempt    Chart reviewed. Attempted Occupational Therapy Evaluation/Treatment, however, patient unable to be seen due to:  []  Nausea/vomiting  []  Eating  []  Pain  []  Patient too lethargic  []  Off Unit for testing/procedure  []  Dialysis treatment in progress   []  Telemetry Results  [x]  Other: Pt w/o clothes; requested attempt ~11:00    Will follow up later as patient's schedule allows.    Thank you for this referral.  Mariana Novoa, OTR/L, CSRS

## 2019-11-07 NOTE — PROGRESS NOTES
Progress Note POD #1      Patient: Mirela Odom               Sex: female          DOA: 11/6/2019         YOB: 1955      Surgery: Procedure(s):  LEFT TOTAL KNEE ARTHROPLASTY **SPEC POP**           LOS: 1 day               Subjective:      C/O being sleepy. Did not sleep last night. Thinks Percocet may be causing her to be sleepy. Objective:      Visit Vitals  /75   Pulse 98   Temp 99.4 °F (37.4 °C)   Resp 15   Ht 5' 5\" (1.651 m)   Wt 117.3 kg (258 lb 8 oz)   SpO2 95%   BMI 43.02 kg/m²       Physical Exam:  Neurological: Dressing C/D/I.                            sensation: intact to light touch. No calf pain to palpation. Patient mobility  Bed Mobility Training  Supine to Sit: Contact guard assistance, Minimum assistance, Additional time(vc)  Scooting: Contact guard assistance, Additional time(vc)  Transfer Training  Sit to Stand: Minimum assistance(vc)  Stand to Sit: Minimum assistance(vc)      Gait Training  Assistive Device: Walker, rolling, Gait belt  Ambulation - Level of Assistance: Minimal assistance, Additional time(vc)  Distance (ft): 10 Feet (ft)  Interventions: Safety awareness training, Tactile cues, Verbal cues, Visual/Demos   Weight Bearing Status  Left Side Weight Bearing: As tolerated        Intake and Output:  Current Shift:  11/07 0701 - 11/07 1900  In: -   Out: 600 [Urine:600]  Last three shifts:  11/05 1901 - 11/07 0700  In: 4137.1 [P.O.:1500;  I.V.:2637.1]  Out: 5880 [Urine:3200; Drains:105]    Lab/Data Reviewed:  Lab Results   Component Value Date/Time    WBC 7.8 10/24/2019 10:04 AM    HGB 12.3 11/07/2019 02:45 AM    HCT 37.9 11/07/2019 02:45 AM    PLATELET 926 40/46/8834 10:04 AM    MCV 85.6 10/24/2019 10:04 AM     Lab Results   Component Value Date/Time    aPTT 30.7 07/19/2012 01:30 PM     Lab Results   Component Value Date/Time    INR 0.9 07/19/2012 01:30 PM    INR 1.0 03/28/2012 02:38 PM    Prothrombin time 12.0 07/19/2012 01:30 PM    Prothrombin time 12.4 03/28/2012 02:38 PM          Assessment/Plan     Principal Problem:    Primary osteoarthritis of left knee (10/29/2019)    Active Problems:    Osteoarthritis of left knee (11/6/2019)        1. Stable  2. OOB with PT  3. D/C Planning  4. D/C drain & change dressing prior to discharge home. 5.Discharge to home after being cleared by P.T  6. Follow-up in 10 days with Dr. Emerson Richardson. 7. Change  to Norco instead of Percocet.

## 2019-11-07 NOTE — PROGRESS NOTES
Patient was visited by Spiritual Care volunteer. Volunteer conducted a Spiritual Care Screening and reported no needs to this . Spiritual Care literature was provided. Chaplains will continue to follow and will provide pastoral care as needed or requested. [address], M.Div.   Board Certified   [phone] - Office

## 2019-11-07 NOTE — PROGRESS NOTES
Problem: Self Care Deficits Care Plan (Adult)  Goal: *Acute Goals and Plan of Care (Insert Text)  Description  Initial Occupational Therapy Goals (11/7/2019) Within 7 day(s):    1. Patient will perform grooming standing sinkside with Supervision for increased independence in ADLs. 2. Patient will perform UB dressing with setup seated EOB for increased independence with ADLs. 3. Patient will perform LB dressing with setup/Prabhu & A/E PRN for increased independence with ADLs. 4. Patient will perform all aspects of toileting with Supervision for increased independence with ADLs. 5. Patient will perform LB ADLs utilizing body mechanics & adaptive strategies with 1 verbal cue for increased safety in ADLs. 6. Patient will independently apply energy conservation techniques with 1 verbal cue(s)for increased independence with ADLs. Outcome: Progressing Towards Goal     OCCUPATIONAL THERAPY EVALUATION/DISCHARGE    Patient: Sheldon Santos (81 y.o. female)  Date: 11/7/2019  Primary Diagnosis: Osteoarthritis of left knee [M17.12]  Procedure(s) (LRB):  LEFT TOTAL KNEE ARTHROPLASTY **SPEC POP** (Left) 1 Day Post-Op   Precautions: L TKA  Fall, WBAT  PLOF: Patient reports independent at baseline    ASSESSMENT AND RECOMMENDATIONS:  Based on the objective data described below, the patient presents with LLE decreased ROM and strength affecting LE ADLs. Patient able to utilize RLE side sitting technique and bending forward with LLE for sock donning. Pt w/ tight pants and unable to dress d/t HemeVAC, therefore deferred to socks w/ pt able to complete w/ Prabhu for threading over LLE, but limited d/t ROM. Pt should increased independence as L Knee ROM. Pt will require assist w/ compression hose which patient reports family can assist.  Family present and instructed on compensatory strategy for compression hose.      Education: Reviewed home safety, body mechanics, importance of moving every hour to prevent joint stiffness, role of ice for edema/pain control, Rolling Walker management/safety, and adaptive dressing techniques with patient verbalizing  understanding at this time     Skilled Occupational Therapy is not indicated at this time in this setting. Patient should continue to improve as pain and ROM/strength improves. Discharge Recommendations: Home Health  Further Equipment Recommendations for Discharge: To Be Determined (TBD) at next level of care        SUBJECTIVE:   Patient stated I don't like feeling this way. I was on something that helped with the pain and didn't make me feel like a zombie.     OBJECTIVE DATA SUMMARY:     Past Medical History:   Diagnosis Date    Arthritis     osteo-lower back    Arthritis of left knee     Chronic pain     back    Hypertension     10 years    Morbid obesity ([facility] [address]     Morbid obesity with BMI of 40.0-44.9, adult ([facility] [address]     Thromboembolus ([facility] [address]     PE- leslie dickinson MD's felt it was from the activity     Past Surgical History:   Procedure Laterality Date    HX  SECTION      X 2    HX GASTRIC BYPASS  2005    HX ORTHOPAEDIC  4-12    L4-L5 surgery    HX ROTATOR CUFF REPAIR Left     HX TUBAL LIGATION       Barriers to Learning/Limitations: yes;  cognitive and physical  Compensate with: visual, verbal, tactile, kinesthetic cues/model    Home Situation/Prior level of Function:   Home Situation  Home Environment: Private residence  # Steps to Enter: 1  One/Two Story Residence: Two story  # of Interior Steps: 15  Interior Rails: Left  Lift Chair Available: No  Living Alone: No  Support Systems: Spouse/Significant Other/Partner  Patient Expects to be Discharged to[de-identified] Private residence  Current DME Used/Available at Home: [address], rolling  ? Right hand dominant   ? Left hand dominant    Cognitive/Behavioral Status:  Neurologic State: Drowsy  Orientation Level: Oriented X4  Cognition: Decreased attention/concentration; Follows commands  Safety/Judgement: Awareness of environment; Fall prevention; Insight into deficits    Skin: L knee incision w/ Mepilex   Edema: compression hose in place & applied ice     Coordination:  Coordination: Within functional limits  Fine Motor Skills-Upper: Left Intact; Right Intact    Gross Motor Skills-Upper: Left Intact; Right Intact    Balance:  Sitting: Intact  Standing: Intact; With support  Standing - Static: Fair  Standing - Dynamic : Fair    Strength: BUE  Strength: Generally decreased, functional    Tone & Sensation:BUE  Tone: Normal  Sensation: Intact    Range of Motion:BUE  AROM: Generally decreased, functional    Functional Mobility and Transfers for ADLs:  Bed Mobility:  Scooting: Supervision  Transfers:  Sit to Stand: Supervision; Adaptive equipment  Bed to Chair: Supervision; Adaptive equipment   Toilet Transfer : Supervision; Adaptive equipment    ADL Assessment/Intervention:  Feeding: Independent  Oral Facial Hygiene/Grooming: Supervision(standing sinkside)  Bathing: Setup;Minimum assistance; Additional time  Upper Body Dressing: Setup  Lower Body Dressing: Minimum assistance; Additional time  Toileting: Supervision    Feeding  Drink to Mouth: Independent  Grooming  Washing Hands: Supervision(standing sinkside)    Lower Body Dressing Assistance  Socks: Minimum assistance(side sitting RLE; bending forward LLE)  Position Performed: Seated edge of bed    LE Adaptive Equipment:  ? Adaptive Equipment was not issued due patient able to complete with out use of AE and use of AE would prevent stretching needed to progress with recovery. (Pt able to complete w/ compensatory strategies and able to compensate for socks w/ clothing modifications, but will require assist with Compression hose). Toileting  Bladder Hygiene: Supervision  Clothing Management: Supervision  Adaptive Equipment: Walker    Cognitive Retraining  Problem Solving: Inductive reason; Identifying the task; Identifying the problem;General alternative solution;Deductive reason; Awareness of environment  Executive Functions: Executing cognitive plans  Organizing/Sequencing: Breaking task down;Prioritizing  Safety/Judgement: Awareness of environment; Fall prevention; Insight into deficits    Pain:  Pain level pre-treatment: 6/10  Pain level post-treatment: 6/10  Pain Intervention(s): Medication administer by Nursing (see MAR); Rest, Ice, Repositioning   Response to intervention: Nurse notified, see doc flow sheet    Activity Tolerance:   Fair. Patient able to stand 1-2 minute(s). Patient able to complete ADLs with  intermittent rest breaks. Patient limited by pain, ROM, strength, balance. Patient unsteady. Please refer to the flowsheet for vital signs taken during this treatment. After treatment:   ?  Patient left in no apparent distress sitting up in chair  ? Patient sitting on EOB  ? Patient left in no apparent distress in bed  ? Call bell left within reach  ? Nursing notified  ? Caregiver present/family  ? Ice applied  ? SCD's on while back in bed    COMMUNICATION/EDUCATION:   Communication/Collaboration:  ?       Role of Occupational Therapy in the acute care setting. ? Home safety education was provided and the patient/caregiver indicated understanding. ? Patient/family have participated as able in goal setting and plan of care. ?      Patient/family agree to work toward stated goals and plan of care. ?      Patient understands intent and goals of therapy, but is neutral about his/her participation. ? Patient is unable to participate in plan of care at this time. Thank you for this referral.  Mariana Novoa, OTR/L, CSRS  Time Calculation: 15 mins    Eval Complexity: History: HIGH Complexity : Extensive review of history including physical, cognitive and psychosocial history ;    Examination: MEDIUM Complexity : 3-5 performance deficits relating to physical, cognitive , or psychosocial skils that result in activity limitations and / or participation restrictions; Decision Making:MEDIUM Complexity : Patient may present with comorbidities that affect occupational performnce.  Miniml to moderate modification of tasks or assistance (eg, physical or verbal ) with assesment(s) is necessary to enable patient to complete evaluation

## 2019-11-07 NOTE — PROGRESS NOTES
1496 - Patient in bed at this time. Pt drowsy but oriented x 3. IV to right hand  intact and patent. Jose E to right leg was switched to larger size pt previously complained of tingling now says its not tingling anymore. Elastic dressing to left knee with immobilizer and hemovac CDI. Pedal pulses palpable. Lungs clear. Bowel sounds active to all quadrants. Patient able to get to 2250 on the incentive spirometer. Pain 8/10.     1935-Mews a 3 BP elevated is almost exactly her induction BP so baseline. HR elevated pt said shes in pain, was given Norco pt said 8/10 pain said she felt like pain medicine wears off and inquired about Tramadol. Reminded pt for need to be careful with narcotics due due her sedation levels, encouraged pt to ask MD in the AM about switching to tramadol, also gave ice and informed her they would  stay on top of her pain medication.

## 2019-11-07 NOTE — PROGRESS NOTES
Problem: Mobility Impaired (Adult and Pediatric)  Goal: *Acute Goals and Plan of Care (Insert Text)  Description  In 1-7 days pt will be able to perform:  ST.  Bed mobility:  Rolling L to R to L modified independent for positioning. 2.  Supine to sit to supine S with HR for meals. 3.  Sit to stand to sit S with RW in prep for ambulation. LT.  Gait:  Ambulate >150ft S with RW, WBAT, for home/community mobility. 2.  Stair Negotiation:  Ascend/descend >4 steps CGA with HR for home entry. 3.  Activity tolerance: Tolerate up in chair 1-2 hours for ADLs. 4.  Patient/Family Education:  Patient/family to be independent with HEP for follow-up care and safe discharge. Outcome: Progressing Towards Goal   PHYSICAL THERAPY TREATMENT    Patient: Rhett Castillo (58 y.o. female)  Date: 2019  Diagnosis: Osteoarthritis of left knee [M17.12] Primary osteoarthritis of left knee  Procedure(s) (LRB):  LEFT TOTAL KNEE ARTHROPLASTY **SPEC POP** (Left) 1 Day Post-Op  Precautions: Fall, WBAT  PLOF: ambulatory without AD, has a RW    ASSESSMENT:  Pt sitting EOB starting to eat lunch, KI donned. Pt agreeable to participate at this time but groggy. Removed KI, pt with good quad control, able to lift LLE off the floor without extensor lag. KI no longer needed. Pt able to perform sit to stand with bed in lowest position and CGA/Prabhu. Ambulated with RW, step to gt pattern, circumduction and trunk sway used to progress LLE forward through swing phase of gait. VC for RW mgmt and gt sequence. Pt attempting to flex knee during gait, decreased step height and length, decreased pace, no LOB or knee buckling noted. Pt left in bathroom to void, OT arrived and left pt with OT. Progression toward goals:   ?      Improving appropriately and progressing toward goals  ? Improving slowly and progressing toward goals  ?       Not making progress toward goals and plan of care will be adjusted     PLAN:  Patient continues to benefit from skilled intervention to address the above impairments. Continue treatment per established plan of care. Discharge Recommendations:  Home Health  Further Equipment Recommendations for Discharge:  rolling walker     SUBJECTIVE:   Patient stated I am sleepy.     OBJECTIVE DATA SUMMARY:   Critical Behavior:  Neurologic State: Drowsy  Orientation Level: Oriented X4  Cognition: Follows commands  Safety/Judgement: Awareness of environment  Functional Mobility Training:  Transfers:  Sit to Stand: Contact guard assistance;Minimum assistance  Stand to Sit: Contact guard assistance  Balance:  Sitting: Intact  Standing: Intact; With support  Standing - Static: Fair  Standing - Dynamic : Fair   Ambulation/Gait Training:  Distance (ft): 140 Feet (ft)  Assistive Device: Gait belt;Walker, rolling  Ambulation - Level of Assistance: Contact guard assistance;Minimal assistance  Gait Abnormalities: Antalgic;Decreased step clearance; Step to gait;Circumduction;Trunk sway increased    Left Side Weight Bearing: As tolerated  Base of Support: Widened  Stance: Right increased  Speed/Mary: Slow;Delayed  Step Length: Right shortened;Left shortened  Swing Pattern: Left asymmetrical  Pain:  Pain level pre-treatment: 7/10  Pain level post-treatment: 6-7/10   Pain Intervention(s): Medication (see MAR); Rest, Ice, Repositioning   Response to intervention: Nurse notified, See doc flow    Activity Tolerance:   Fair, groggy  Please refer to the flowsheet for vital signs taken during this treatment. After treatment:   ? Patient left in no apparent distress sitting up in chair  ? Patient left in no apparent distress in bathroom  ? Call bell left within reach  ? Nursing notified  ? Caregiver present  ? Bed alarm activated  ? SCDs applied      COMMUNICATION/EDUCATION:   ?         Role of Physical Therapy in the acute care setting. ?         Fall prevention education was provided and the patient/caregiver indicated understanding.   ? Patient/family have participated as able in working toward goals and plan of care. ?         Patient/family agree to work toward stated goals and plan of care. ?         Patient understands intent and goals of therapy, but is neutral about his/her participation. ? Patient is unable to participate in stated goals/plan of care: ongoing with therapy staff.   ?         Other:        Surendra Moment, PTA   Time Calculation: 21 mins

## 2019-11-07 NOTE — ROUTINE PROCESS
Bedside shift change report given to Javier Canela RN (oncoming nurse) by Bettye SUAREZ RN (offgoing nurse). Report included the following information SBAR, Kardex and MAR.

## 2019-11-07 NOTE — PROGRESS NOTES
Problem: Mobility Impaired (Adult and Pediatric)  Goal: *Acute Goals and Plan of Care (Insert Text)  Description  In 1-7 days pt will be able to perform:  ST.  Bed mobility:  Rolling L to R to L modified independent for positioning. 2.  Supine to sit to supine S with HR for meals. 3.  Sit to stand to sit S with RW in prep for ambulation. LT.  Gait:  Ambulate >150ft S with RW, WBAT, for home/community mobility. 2.  Stair Negotiation:  Ascend/descend >4 steps CGA with HR for home entry. 3.  Activity tolerance: Tolerate up in chair 1-2 hours for ADLs. 4.  Patient/Family Education:  Patient/family to be independent with HEP for follow-up care and safe discharge. 2019 1626 by Marisela Simon PTA  Outcome: Progressing Towards Goal  2019 1235 by Marisela Simon PTA  Outcome: Progressing Towards Goal   PHYSICAL THERAPY TREATMENT    Patient: Ernesta Olszewski (28 y.o. female)  Date: 2019  Diagnosis: Osteoarthritis of left knee [M17.12] Primary osteoarthritis of left knee  Procedure(s) (LRB):  LEFT TOTAL KNEE ARTHROPLASTY **SPEC POP** (Left) 1 Day Post-Op  Precautions: Fall, WBAT      ASSESSMENT:  Pt sitting EOB, nurse applying YUE hose upon entering room. Pt able to perform sit to stand with bed in lowest position. Ambulated to bathroom to void, 125cc, indpendent with edgar-care and to stand up from commode. Pt ambulated 120ft in ordaz with RW, step to gt pattern, hip hike and circumduction on the LLE during swing phase of gait, VC to increased hip and knee flexion to progress LE forward. No LOB or knee buckling noted. Increased VC for RW mgmt and safety. Negotiated a 4\" box step with RW 2x with VC. Returned to room. Educated pt on use of a sheet to assist LLE in/out of bed, pt returned demonstration. Pt left sitting EOB. Pt would benefit from 1-2 more PT session tomorrow, unable to safely clear PT today.   Progression toward goals:   ?      Improving appropriately and progressing toward goals  ? Improving slowly and progressing toward goals  ? Not making progress toward goals and plan of care will be adjusted     PLAN:  Patient continues to benefit from skilled intervention to address the above impairments. Continue treatment per established plan of care. Discharge Recommendations:  Home Health  Further Equipment Recommendations for Discharge:  rolling walker     SUBJECTIVE:   Patient stated I feel it stretching behind the leg.     OBJECTIVE DATA SUMMARY:   Critical Behavior:  Neurologic State: Drowsy  Orientation Level: Oriented X4  Cognition: Decreased attention/concentration, Follows commands  Safety/Judgement: Awareness of environment, Fall prevention, Insight into deficits  Functional Mobility Training:  Bed Mobility:    Supine to Sit: Modified independent;Contact guard assistance; Additional time  Sit to Supine: Modified independent;Stand-by assistance; Additional time  Scooting: Supervision    Transfers:  Sit to Stand: Stand-by assistance;Contact guard assistance  Stand to Sit: Stand-by assistance    Bed to Chair: Supervision; Adaptive equipment  Balance:  Sitting: Intact  Standing: Intact; With support  Standing - Static: Fair  Standing - Dynamic : Fair   Range Of Motion:   AROM: Generally decreased, functional   Ambulation/Gait Training:  Distance (ft): 120 Feet (ft)  Assistive Device: Gait belt;Walker, rolling  Ambulation - Level of Assistance: Contact guard assistance  Gait Abnormalities: Antalgic;Decreased step clearance;Circumduction;Hip Hike;Step to gait    Left Side Weight Bearing: As tolerated  Base of Support: Widened  Stance: Right increased  Speed/Mary: Slow  Step Length: Right shortened;Left shortened  Swing Pattern: Left asymmetrical  Stairs:  Number of Stairs Trained: 2(4\" box step)  Stairs - Level of Assistance: Contact guard assistance  Rail Use: (RW)  Pain:  Pain level pre-treatment: 6/10  Pain level post-treatment: 5/10   Pain Intervention(s): Medication (see MAR); Rest, Ice, Repositioning   Response to intervention: Nurse notified, See doc flow    Activity Tolerance:   Fair  Please refer to the flowsheet for vital signs taken during this treatment. After treatment:   ? Patient left in no apparent distress sitting up in chair  ? Patient left in no apparent distress in bed  ? Call bell left within reach  ? Nursing notified  ? Caregiver present  ? Bed alarm activated  ? SCDs applied      COMMUNICATION/EDUCATION:   ?         Role of Physical Therapy in the acute care setting. ?         Fall prevention education was provided and the patient/caregiver indicated understanding. ? Patient/family have participated as able in working toward goals and plan of care. ?         Patient/family agree to work toward stated goals and plan of care. ?         Patient understands intent and goals of therapy, but is neutral about his/her participation. ? Patient is unable to participate in stated goals/plan of care: ongoing with therapy staff.   ?         Other:        Valerie Mask, PTA   Time Calculation: 41 mins

## 2019-11-07 NOTE — PROGRESS NOTES
Problem: Falls - Risk of  Goal: *Absence of Falls  Description  Document Aimeececille Perkins Fall Risk and appropriate interventions in the flowsheet.   Outcome: Progressing Towards Goal  Note:   Fall Risk Interventions:  Mobility Interventions: Patient to call before getting OOB, PT Consult for mobility concerns, PT Consult for assist device competence, Utilize walker, cane, or other assistive device    Medication Interventions: Patient to call before getting OOB, Teach patient to arise slowly    Elimination Interventions: Patient to call for help with toileting needs, Toilet paper/wipes in reach, Toileting schedule/hourly rounds, Call light in reach

## 2019-11-07 NOTE — DISCHARGE INSTRUCTIONS
Dr. Rose Scanlon Post-Operative Instructions Total Knee Replacement    ACTIVITIES :  1. You may be up and walking about the house with your walker. 2.  Activities around the house, such as washing dishes, fixing light meals, and your own personal care are fine. 3.  Avoid strenuous activities, such as vacuuming, lifting laundry or grocery bags. 4.  Walking is the best way to rebuild strength and stamina. Start SLOWLY and gradually increase your distance. 5.  Avoid any jogging, running or excessive stair-climbing   6. Your home physical therapist will work with you and your range-of-motion for the first 7-14 days. After your first visit with Dr. Queen Alves you will be scheduled for out-patient physical therapy at a site convenient for you. 7.  Follow-up with Dr. Queen Alves in 10-14 days. BATHING and INCISION CARE:  1. The incision may be tender to touch or feel numb: this is normal.   2.  Keep the incision clean and dry no showering until your follow-up appointment. The incision will be closed with sutures under the skin. 3.  Do not apply any lotions, ointments or oils on the incision. 4.  If you notice any excessive swelling, redness, or persistent drainage around the incision, notify the office immediately. DRIVIN. You should not drive until after your follow-up appointment. 2.  You can be in a vehicle for short distances, but if you travel any long distance, please stop about every 30 minutes and walk/stretch. 3.  You should NEVER drive while taking narcotic medication. 4.  Driving will be permitted on right knee replacements after the therapist has confirmed a range-of-motion of a 105 degrees. Left knee replacements may drive at 2 weeks post-op. RETURN TO WORK :  1. The decision to return to work will be determined on an individual basis. 2.  Many people who have a strenuous job (construction, heavy labor, etc) may need to be off work for up to 12 weeks.    3.  If you need a work note, please let us know as soon as possible, and not the same day you are planning to return to work. NUTRITION :  1.  Good nutrition is an essential part of healing. 2.  You should eat a balanced diet each day, including fruits, vegetables, dairy products and protein. 3.  Remember to drink plenty of water. 4.  If you have not had a bowel movement within 3 days of surgery, you will need to use a laxative or suppository that can be obtained over-the-counter at your local pharmacy. MEDICATIONS -  1. You may resume the medications you were taking before surgery. 2.  You will receive a prescription for pain medication at discharge from the hospital. The pain medication works best if taken before the pain becomes severe. 3.  To reduce stomach upset, always take the medication with food. 4.  Begin to wean yourself off the pain medication during the second week after discharge. 5.  If you need a refill, please call the office during working hours at least 2 days before your prescription runs out. Do not wait until your bottle is empty to call for a refill. 6.  You will also be prescribed a blood thinner that you will take by mouth for 10-14 days post-operatively. 7.  DO NOT drive if you are taking narcotic pain medications. HOME HEALTH CARE:  1.   A home health care service has been set-up for you to help assist you once you leave the hospital.  2.  They will contact you either before you leave the hospital or within 24 hours once you have been discharged home. 3. A nurse will assist you with your dressing changes and a Physical Therapist with help you with your therapy needs. 4.  A CPM machine will be delivered to your home. The CPM machine will begin at 0-70 degrees at home and you can add 5 degrees to your range-of-motion each day as tolerated. To a maximum of 120 degrees.      CALL THE OFFICE:   If you have severe pain unrelieved by the medications;   If you have a fever of 101.0°F or greater;    If you notice excessive swelling, redness, or persistent drainage from the incision or IV site; [facility] office number is [phone] from 8:00am to 5:00pm Monday through Friday. After 5:00pm, on weekends, or holidays, please leave a message with our answering service and the doctor on-call will get back to you shortly.

## 2019-11-07 NOTE — PROGRESS NOTES
Transition of Care (JUHI) Plan:     Chart reviewed, met with pt in room. Pt lethargic, but able to participate in conversation. Pt states she plans discharge home, has help at home. FOC offered, pt chose [facility]  [phone] for follow up; referral placed with CMS. Pt has RW for home. Made pt aware her insurance does not cover CPM.       JUHI Transportation:   How is patient being transported at discharge? Family/Friend      When? Once cleared by Therapy between 12-2pm     Is transport scheduled? N/A      Follow-up appointment and transportation:   PCP/Specialist?  See AVS for Appointment         Who is transporting to the follow-up appointment? Family/Friend      Is transport for follow up appointment scheduled? N/A    Communication plan (with patient/family): Who is being called? Patient or Next of Kin? Responsible party? Patient      What number(s) is to be used? See Facesheet      What service provider is calling for [facility] services? When are they calling? 24-48 hours following discharge    Readmission Risk? (Green/Low; Yellow/Moderate; Red/High):  Green    Care Management Interventions  PCP Verified by CM:  Yes  Transition of Care Consult (CM Consult): [address]: No  Reason Outside [place]: Physician referred to specific agency([facility])  Discharge Durable Medical Equipment: No  Physical Therapy Consult: Yes  Occupational Therapy Consult: Yes  Current Support Network: Own Home  Confirm Follow Up Transport: Family  Plan discussed with Pt/Family/Caregiver: Yes  Freedom of Choice Offered: Yes  Discharge Location  Discharge Placement: Home with home health

## 2019-11-07 NOTE — PROGRESS NOTES
Occupational Therapy Evaluation/Treatment Attempt    Chart reviewed. Attempted Occupational Therapy Evaluation/Treatment, however, patient unable to be seen due to:  []  Nausea/vomiting  []  Eating  []  Pain  [x]  Patient too lethargic  []  Off Unit for testing/procedure  []  Dialysis treatment in progress   []  Telemetry Results  []  Other:     Will follow up later as patient's schedule allows.    Thank you for this referral.  Mariana Novoa, OTR/L, CSRS

## 2019-11-08 VITALS
OXYGEN SATURATION: 99 % | SYSTOLIC BLOOD PRESSURE: 167 MMHG | DIASTOLIC BLOOD PRESSURE: 101 MMHG | BODY MASS INDEX: 43.07 KG/M2 | HEART RATE: 109 BPM | HEIGHT: 65 IN | RESPIRATION RATE: 18 BRPM | TEMPERATURE: 98.8 F | WEIGHT: 258.5 LBS

## 2019-11-08 LAB
HCT VFR BLD AUTO: 35.5 % (ref 35–45)
HGB BLD-MCNC: 11.4 G/DL (ref 12–16)

## 2019-11-08 PROCEDURE — 97116 GAIT TRAINING THERAPY: CPT

## 2019-11-08 PROCEDURE — 85018 HEMOGLOBIN: CPT

## 2019-11-08 PROCEDURE — 97110 THERAPEUTIC EXERCISES: CPT

## 2019-11-08 PROCEDURE — 74011250637 HC RX REV CODE- 250/637: Performed by: PHYSICIAN ASSISTANT

## 2019-11-08 PROCEDURE — 36415 COLL VENOUS BLD VENIPUNCTURE: CPT

## 2019-11-08 PROCEDURE — 74011250636 HC RX REV CODE- 250/636: Performed by: PHYSICIAN ASSISTANT

## 2019-11-08 RX ORDER — LOSARTAN POTASSIUM 50 MG/1
100 TABLET ORAL DAILY
Status: DISCONTINUED | OUTPATIENT
Start: 2019-11-08 | End: 2019-11-08 | Stop reason: HOSPADM

## 2019-11-08 RX ORDER — AMLODIPINE BESYLATE 5 MG/1
10 TABLET ORAL DAILY
Status: DISCONTINUED | OUTPATIENT
Start: 2019-11-08 | End: 2019-11-08 | Stop reason: HOSPADM

## 2019-11-08 RX ADMIN — ACETAMINOPHEN 325 MG: 325 TABLET ORAL at 00:29

## 2019-11-08 RX ADMIN — HYDROCODONE BITARTRATE AND ACETAMINOPHEN 1 TABLET: 7.5; 325 TABLET ORAL at 04:19

## 2019-11-08 RX ADMIN — Medication 10 ML: at 05:27

## 2019-11-08 RX ADMIN — HYDROCODONE BITARTRATE AND ACETAMINOPHEN 1 TABLET: 7.5; 325 TABLET ORAL at 07:35

## 2019-11-08 RX ADMIN — HYDROCODONE BITARTRATE AND ACETAMINOPHEN 1 TABLET: 7.5; 325 TABLET ORAL at 11:36

## 2019-11-08 RX ADMIN — HYDROCODONE BITARTRATE AND ACETAMINOPHEN 1 TABLET: 7.5; 325 TABLET ORAL at 00:28

## 2019-11-08 RX ADMIN — ENOXAPARIN SODIUM 30 MG: 30 INJECTION SUBCUTANEOUS at 11:36

## 2019-11-08 RX ADMIN — LOSARTAN POTASSIUM 100 MG: 50 TABLET ORAL at 08:49

## 2019-11-08 RX ADMIN — LUBIPROSTONE 24 MCG: 24 CAPSULE, GELATIN COATED ORAL at 08:49

## 2019-11-08 RX ADMIN — DIPHENHYDRAMINE HYDROCHLORIDE 25 MG: 25 CAPSULE ORAL at 07:35

## 2019-11-08 RX ADMIN — DIPHENHYDRAMINE HYDROCHLORIDE 25 MG: 25 CAPSULE ORAL at 12:50

## 2019-11-08 RX ADMIN — FERROUS SULFATE TAB 325 MG (65 MG ELEMENTAL FE) 325 MG: 325 (65 FE) TAB at 08:49

## 2019-11-08 RX ADMIN — AMLODIPINE BESYLATE 10 MG: 5 TABLET ORAL at 08:49

## 2019-11-08 RX ADMIN — DIPHENHYDRAMINE HYDROCHLORIDE 25 MG: 25 CAPSULE ORAL at 02:26

## 2019-11-08 NOTE — PROGRESS NOTES
Dual AVS reviewed with LEANN Law RN. All medications reviewed individually with patient. Opportunities for questions and concerns provided. Patient to be discharged via (mode of transport ie. Car, ambulance or air transport) car. Patient's arm band appropriately discarded.     IV removed  D/c paperwork, signed copied and placed in chart

## 2019-11-08 NOTE — PROGRESS NOTES
Progress Note POD #2      Patient: Neida Cornea               Sex: female          DOA: 11/6/2019         YOB: 1955      Surgery: Procedure(s):  LEFT TOTAL KNEE ARTHROPLASTY **SPEC POP**           LOS: 2 days               Subjective:     No new complaints. Norco helps pain , but feels it causes her foot to itch. Objective:      Visit Vitals  BP (!) 157/95   Pulse 94   Temp 98.4 °F (36.9 °C)   Resp 18   Ht 5' 5\" (1.651 m)   Wt 117.3 kg (258 lb 8 oz)   SpO2 94%   BMI 43.02 kg/m²       Physical Exam:  Neurological: Dressing C/D/I.                            sensation: intact to light touch. No calf pain to palpation. Walking to bathroom with nurse. Patient mobility  Bed Mobility Training  Supine to Sit: Modified independent, Contact guard assistance, Additional time  Sit to Supine: Modified independent, Stand-by assistance, Additional time  Scooting: Supervision  Transfer Training  Sit to Stand: Stand-by assistance, Contact guard assistance  Stand to Sit: Stand-by assistance  Bed to Chair: Supervision, Adaptive equipment      Gait Training  Assistive Device: Gait belt, Walker, rolling  Ambulation - Level of Assistance: Contact guard assistance  Distance (ft): 120 Feet (ft)  Stairs - Level of Assistance: Contact guard assistance  Number of Stairs Trained: 2(4\" box step)  Rail Use: (RW)  Interventions: Safety awareness training, Tactile cues, Verbal cues, Visual/Demos   Weight Bearing Status  Left Side Weight Bearing: As tolerated   Patient Response  Patient Response: fair    Intake and Output:  Current Shift:  No intake/output data recorded. Last three shifts:  11/06 1901 - 11/08 0700  In: 2637.1 [P.O.:2000;  I.V.:637.1]  Out: 4037 [Urine:3925; Drains:105]    Lab/Data Reviewed:  Lab Results   Component Value Date/Time    WBC 7.8 10/24/2019 10:04 AM    HGB 11.4 (L) 11/08/2019 03:40 AM    HCT 35.5 11/08/2019 03:40 AM    PLATELET 118 69/63/7898 10:04 AM    MCV 85.6 10/24/2019 10:04 AM     Lab Results   Component Value Date/Time    aPTT 30.7 07/19/2012 01:30 PM     Lab Results   Component Value Date/Time    INR 0.9 07/19/2012 01:30 PM    INR 1.0 03/28/2012 02:38 PM    Prothrombin time 12.0 07/19/2012 01:30 PM    Prothrombin time 12.4 03/28/2012 02:38 PM          Assessment/Plan     Active Problems:    * No active hospital problems. *      1. Stable  2. OOB with PT  3. D/C Planning  4,change dressing prior to discharge home. 5.Discharge to home after being cleared by P.T  6. Follow-up in 10 days with Dr. Laney Ortega. 7. Elevated BP. give Norvasc & Cozaar now.

## 2019-11-08 NOTE — PROGRESS NOTES
Problem: Falls - Risk of  Goal: *Absence of Falls  Description  Document Donata Carrel Fall Risk and appropriate interventions in the flowsheet.   Outcome: Progressing Towards Goal  Note:   Fall Risk Interventions:  Mobility Interventions: Communicate number of staff needed for ambulation/transfer, Patient to call before getting OOB, Utilize walker, cane, or other assistive device         Medication Interventions: Assess postural VS orthostatic hypotension, Patient to call before getting OOB, Teach patient to arise slowly    Elimination Interventions: Call light in reach, Patient to call for help with toileting needs

## 2019-11-08 NOTE — ROUTINE PROCESS
Bedside and Verbal shift change report given to Marc Gutierrez RN by Marc Gutierrez RN. Report included the following information SBAR, Kardex, OR Summary, Intake/Output and MAR.

## 2019-11-08 NOTE — PROGRESS NOTES
19:35 Assessment completed. Lungs are clear bilat but are slightly decreased in the bases. Mepilex dsg on L knee  remains D/I with several sm shadows present approx pencil point to eraser sized. + CMS & + PP. Ice packs was refilled & re-applied. H-vac with serosanguinous drainage. Continues to have tingling in L ft but 0 numbness. Denies pain or discomfort in calves. Resting quietly in bed with son @ bedside visiting. Voiding per BR w/o difficulty. 22:45 Shift assessment completed. See nsg flow sheet for details. 02:55 Reassessed with 0 changes noted. Mepilex dsg remains D/I with CMS & PP intact. Continues to deny pain or discomfort in calves. Ice pack was refilled & re-applied. Resting quietly in bed with eyes closed between cares x for voiding per urinal w/o difficulty. 06:45 Up in the chair @ bedside with call bell & telephone within reach.

## 2019-11-08 NOTE — PROGRESS NOTES
Problem: Mobility Impaired (Adult and Pediatric)  Goal: *Acute Goals and Plan of Care (Insert Text)  Description  In 1-7 days pt will be able to perform:  ST.  Bed mobility:  Rolling L to R to L modified independent for positioning. 2.  Supine to sit to supine S with HR for meals. 3.  Sit to stand to sit S with RW in prep for ambulation. LT.  Gait:  Ambulate >150ft S with RW, WBAT, for home/community mobility. 2.  Stair Negotiation:  Ascend/descend >4 steps CGA with HR for home entry. 3.  Activity tolerance: Tolerate up in chair 1-2 hours for ADLs. 4.  Patient/Family Education:  Patient/family to be independent with HEP for follow-up care and safe discharge. Outcome: Resolved/Met   PHYSICAL THERAPY TREATMENT AND DISCHARGE    Patient: Ernesta Olszewski (52 y.o. female)  Date: 2019  Diagnosis: Osteoarthritis of left knee [M17.12] Primary osteoarthritis of left knee  Procedure(s) (LRB):  LEFT TOTAL KNEE ARTHROPLASTY **SPEC POP** (Left) 2 Days Post-Op  Precautions: Fall, WBAT  PLOF: has a RW    ASSESSMENT:  Pt sitting in chair upon entering room. VC for UE placement for ease of sit to stand from chair. Ambulated 180ft with RW, step to gt pattern, less hip hike this session when progressing LLE through swing phase of gait. Very slow pace, no LOB or path deviations. Step nego performed and goal met last session. Reviewed and performed seated and supine ROM strengthening exercises. Pt utilizes a sheet to assist pulling/lifting LLE in/out of bed without requiring additional assistance. Pt left in supine, SCDs and ice packs donned. PLAN:  Maximum therapeutic gains met at current level of care and patient will be discharged from physical therapy at this time. Rationale for discharge:  ?     Goals Achieved  ? Plateau Reached  ? Patient not participating in therapy  ?      Other:  Discharge Recommendations:  Home Health  Further Equipment Recommendations for Discharge:  rolling walker     SUBJECTIVE:   Patient stated I am not lifting my hip as much.     OBJECTIVE DATA SUMMARY:   Critical Behavior:  Neurologic State: Alert, Eyes open spontaneously  Orientation Level: Oriented X4  Cognition: Appropriate decision making, Appropriate for age attention/concentration, Appropriate safety awareness, Follows commands  Safety/Judgement: Awareness of environment, Fall prevention, Insight into deficits  Functional Mobility Training:  Bed Mobility:  Sit to Supine: Modified independent; Additional time  Transfers:  Sit to Stand: Stand-by assistance  Stand to Sit: Stand-by assistance  Balance:  Sitting: Intact  Standing: Intact; With support   Ambulation/Gait Training:  Distance (ft): 180 Feet (ft)  Assistive Device: Gait belt;Walker, rolling  Ambulation - Level of Assistance: Stand-by assistance;Supervision    Gait Abnormalities: Antalgic;Decreased step clearance;Circumduction;Hip Hike;Step to gait    Left Side Weight Bearing: As tolerated  Base of Support: Widened  Stance: Right increased  Speed/Mary: Slow  Step Length: Right shortened;Left shortened  Swing Pattern: Left asymmetrical  Therapeutic Exercises:   LLE      EXERCISE   Sets   Reps   Active Active Assist   Passive Self ROM   Comments   Ankle Pumps    ? ? ? ?    Quad Sets/Glut Sets    ? ? ? ? Hold for 5 secs   Hamstring Sets   ? ? ? ? Short Arc Quads   ? ? ? ? Heel Slides   ? ? ? ? Straight Leg Raises   ? ? ? ? Hip Add   ? ? ? ? Hold for 5 secs, w/ pillow squeeze   Long Arc Quads   ? ? ? ? Seated HS   ? ? ? ? Assisted LAQ   ? ? ? ?       ? ? ? ? Pain:  Pain level pre-treatment: 0/10   Pain level post-treatment: 4/10   Pain Intervention(s): Medication (see MAR); Rest, Ice, Repositioning   Response to intervention: Nurse notified, See doc flow    Activity Tolerance:   Fair  Please refer to the flowsheet for vital signs taken during this treatment.   After treatment:   ? Patient left in no apparent distress sitting up in chair  ? Patient left in no apparent distress in bed  ? Call bell left within reach  ? Nursing notified  ? Caregiver present  ? Bed alarm activated  ? SCDs applied      COMMUNICATION/EDUCATION:   ?         Role of Physical Therapy in the acute care setting. ?         Fall prevention education was provided and the patient/caregiver indicated understanding. ? Patient/family have participated as able and agree with findings and recommendations. ?         Patient is unable to participate in plan of care at this time. ?          Other:        Jewels Goncalves, ANDREW   Time Calculation: 36 mins

## 2019-11-08 NOTE — DISCHARGE SUMMARY
Discharge Summary    Patient: Richard Hedrick               Sex: female          DOA: 11/6/2019         YOB: 1955      Age:  60 y.o.        LOS:  LOS: 2 days                Admit Date: 11/6/2019    Discharge Date: 11/8/2019    Admission Diagnoses: Osteoarthritis of left knee [M17.12]    Discharge Diagnoses:    Problem List as of 11/8/2019 Date Reviewed: 11/6/2019          Codes Class Noted - Resolved    Hypertension ICD-10-CM: I10  ICD-9-CM: 401.9  Unknown - Present    Overview Signed 8/26/2019  3:47 PM by Yoko Miller NP     10 years             Chronic pain ICD-10-CM: G89.29  ICD-9-CM: 338.29  Unknown - Present    Overview Signed 8/26/2019  3:47 PM by Yoko Miller NP     back             Arthritis ICD-10-CM: M19.90  ICD-9-CM: 716.90  Unknown - Present    Overview Signed 8/26/2019  3:47 PM by Yoko Miller NP     osteo-lower back             Intestinal malabsorption ICD-10-CM: K90.9  ICD-9-CM: 579.9  8/26/2019 - Present        Morbid obesity with BMI of 40.0-44.9, adult ([facility]) ICD-10-CM: E66.01, Z68.41  ICD-9-CM: 278.01, V85.41  Unknown - Present        Morbid obesity ([facility] ICD-10-CM: E66.01  ICD-9-CM: 278.01  Unknown - Present        Arthritis of left knee ICD-10-CM: M17.12  ICD-9-CM: 716.96  Unknown - Present        Status post lumbar surgery ICD-10-CM: Z98.890  ICD-9-CM: V45.89  11/15/2017 - Present        Spondylolisthesis of lumbar region ICD-10-CM: M43.16  ICD-9-CM: 738.4  7/22/2012 - Present        DDD (degenerative disc disease), lumbar ICD-10-CM: M51.36  ICD-9-CM: 722.52  7/22/2012 - Present        Thromboembolus ([facility] ICD-10-CM: I74.9  ICD-9-CM: 444.9  1/1/1977 - Present    Overview Signed 8/26/2019  3:47 PM by Yoko Miller NP PE- jumping teena ZAMORA's felt it was from the activity             RESOLVED: Osteoarthritis of left knee ICD-10-CM: M17.12  ICD-9-CM: 715.96  11/6/2019 - 11/7/2019        * (Principal) RESOLVED: Primary osteoarthritis of left knee ICD-10-CM: M17.12  ICD-9-CM: 715.16  10/29/2019 - 11/7/2019        RESOLVED: Spinal stenosis, lumbar ICD-10-CM: M48.061  ICD-9-CM: 724.02  11/15/2017 - 11/30/2017        RESOLVED: HNP (herniated nucleus pulposus), lumbar ICD-10-CM: M51.26  ICD-9-CM: 722.10  11/15/2017 - 11/30/2017        RESOLVED: Fixation hardware in spine ICD-10-CM: Z96.7  ICD-9-CM: V45.89  7/22/2012 - 7/24/2012              Discharge Condition: Good    Hospital Course: The patient underwent left TKA on 11/6/2019. The patient tolerated the procedure well. Vital signs remained stable and the patient was transferred to  2nd floor surgical unit without complications. The patient remained neurovascularly intact and began getting out of bed with physical therapy on  POD #1. Pain was controlled with femoral nerve block per anesthesia and Percocet which caused her to be sleepy & later changed to  Norco pills for break through pain. The drain was discontinued on POD#2. The patient progressed slowly with physical therapy and was ambulating 120 feet on POD #1 and was therefore discharged the evening of POD#2. The patient had begun Lovenox for DVT prophylaxis on POD#1. Consults: None    Significant Diagnostic Studies:   Recent Labs     11/08/19  0340 11/07/19  0245   HGB 11.4* 12.3     Recent Labs     11/08/19  0340 11/07/19  0245   HCT 35.5 37.9       Current Discharge Medication List      START taking these medications    Details   aspirin (ASPIRIN) 325 mg tablet Take 2 pills 2 x day for 6 weeks  Qty: 120 Tab, Refills: 1      naloxone (NARCAN) 4 mg/actuation nasal spray Use 1 spray intranasally, then discard. Repeat with new spray every 2 min as needed for opioid overdose symptoms, alternating nostrils. Qty: 1 Each, Refills: 0      HYDROcodone-acetaminophen (NORCO) 5-325 mg per tablet Take 1-1.5 Tabs by mouth every four (4) hours as needed for Pain for up to 10 days. Max Daily Amount: 9 Tabs.   Qty: 80 Tab, Refills: 0    Associated Diagnoses: Primary osteoarthritis of left knee         CONTINUE these medications which have NOT CHANGED    Details   ergocalciferol (ERGOCALCIFEROL) 50,000 unit capsule Take 1 cap by mouth twice weekly for 1 year  Qty: 52 Cap, Refills: 1      calcium carbonate (TUMS) 200 mg calcium (500 mg) chew Take 2 Tabs by mouth daily. losartan (COZAAR) 100 mg tablet Take 100 mg by mouth daily. Indications: hypertension      amLODIPine (NORVASC) 10 mg tablet Take 10 mg by mouth daily. Indications: hypertension      multivitamin (ONE A DAY) tablet Take 1 Tab by mouth daily. STOP taking these medications       diclofenac EC (VOLTAREN) 75 mg EC tablet Comments:   Reason for Stopping:         acetaminophen (TYLENOL EXTRA STRENGTH) 500 mg tablet Comments:   Reason for Stopping:               Activity: activity as tolerated, weight bearing as tolerated. No anti- inflammatory medications for 3 months. Use stool softeners at home while taking pain medications since  they are constipating. Diet: Regular Diet    Wound Care: Keep wound clean and dry, Reinforce dressing PRN and ice to area for comfort. Do not get wound wet for 5 days.     Follow-up: 10 days with Dr Joann Abdi

## 2019-11-12 ENCOUNTER — APPOINTMENT (OUTPATIENT)
Dept: GENERAL RADIOLOGY | Age: 64
End: 2019-11-12
Attending: EMERGENCY MEDICINE
Payer: COMMERCIAL

## 2019-11-12 ENCOUNTER — HOSPITAL ENCOUNTER (EMERGENCY)
Age: 64
Discharge: HOME | End: 2019-11-12
Attending: EMERGENCY MEDICINE
Payer: COMMERCIAL

## 2019-11-12 ENCOUNTER — APPOINTMENT (OUTPATIENT)
Dept: VASCULAR SURGERY | Age: 64
End: 2019-11-12
Attending: EMERGENCY MEDICINE
Payer: COMMERCIAL

## 2019-11-12 VITALS
WEIGHT: 255 LBS | TEMPERATURE: 98 F | OXYGEN SATURATION: 96 % | RESPIRATION RATE: 20 BRPM | DIASTOLIC BLOOD PRESSURE: 101 MMHG | BODY MASS INDEX: 42.49 KG/M2 | SYSTOLIC BLOOD PRESSURE: 170 MMHG | HEART RATE: 90 BPM | HEIGHT: 65 IN

## 2019-11-12 DIAGNOSIS — M25.462 PAIN AND SWELLING OF LEFT KNEE: Primary | ICD-10-CM

## 2019-11-12 DIAGNOSIS — M25.562 PAIN AND SWELLING OF LEFT KNEE: Primary | ICD-10-CM

## 2019-11-12 PROCEDURE — 99283 EMERGENCY DEPT VISIT LOW MDM: CPT

## 2019-11-12 PROCEDURE — 93971 EXTREMITY STUDY: CPT

## 2019-11-12 PROCEDURE — 73564 X-RAY EXAM KNEE 4 OR MORE: CPT

## 2019-11-12 RX ORDER — CEPHALEXIN 500 MG/1
500 CAPSULE ORAL 3 TIMES DAILY
Qty: 21 CAP | Refills: 0 | Status: SHIPPED | OUTPATIENT
Start: 2019-11-12 | End: 2019-11-19

## 2019-11-12 NOTE — ED PROVIDER NOTES
EMERGENCY DEPARTMENT HISTORY AND PHYSICAL EXAM    Date: 11/12/2019  Patient Name: Nadine Hooper    History of Presenting Illness     Chief Complaint   Patient presents with    Leg Pain         History Provided By: Patient    4:53 PM  Nadine Hooper is a 60 y.o. female with PMHX of 6 days postop from left knee replacement with Dr. Idalia Quinonez, remote history of DVT, hypertension who presents to the emergency department C/O left knee pain and swelling. Patient states she be recovering well into the last 2 days when the pain and swelling of her knee she states today home health noticed some green drainage during her dressing change. She states she has had some hot flashes but is unsure if she had fever. She denies any chest pain, shortness of breath, other complaints. PCP: Lilia Brantley MD    Current Outpatient Medications   Medication Sig Dispense Refill    cephALEXin (KEFLEX) 500 mg capsule Take 1 Cap by mouth three (3) times daily for 7 days. 21 Cap 0    aspirin (ASPIRIN) 325 mg tablet Take 2 pills 2 x day for 6 weeks 120 Tab 1    naloxone (NARCAN) 4 mg/actuation nasal spray Use 1 spray intranasally, then discard. Repeat with new spray every 2 min as needed for opioid overdose symptoms, alternating nostrils. 1 Each 0    HYDROcodone-acetaminophen (NORCO) 5-325 mg per tablet Take 1-1.5 Tabs by mouth every four (4) hours as needed for Pain for up to 10 days. Max Daily Amount: 9 Tabs. 84 Tab 0    ergocalciferol (ERGOCALCIFEROL) 50,000 unit capsule Take 1 cap by mouth twice weekly for 1 year 52 Cap 1    calcium carbonate (TUMS) 200 mg calcium (500 mg) chew Take 2 Tabs by mouth daily.  losartan (COZAAR) 100 mg tablet Take 100 mg by mouth daily. Indications: hypertension      amLODIPine (NORVASC) 10 mg tablet Take 10 mg by mouth daily. Indications: hypertension      multivitamin (ONE A DAY) tablet Take 1 Tab by mouth daily.            Past History     Past Medical History:  Past Medical History:   Diagnosis Date    Arthritis     osteo-lower back    Arthritis of left knee     Chronic pain     back    Hypertension     10 years    Morbid obesity ([facility]     Morbid obesity with BMI of 40.0-44.9, adult ([facility]     Thromboembolus ([facility]     PE- jumping rope MD's felt it was from the activity       Past Surgical History:  Past Surgical History:   Procedure Laterality Date    HX  SECTION      X 2    HX GASTRIC BYPASS  2005    HX ORTHOPAEDIC  4-12    L4-L5 surgery    HX ROTATOR CUFF REPAIR Left     HX TUBAL LIGATION         Family History:  Family History   Problem Relation Age of Onset    Diabetes Mother     Diabetes Sister     Diabetes Brother     Diabetes Sister     Diabetes Sister     Malignant Hyperthermia Neg Hx     Pseudocholinesterase Deficiency Neg Hx     Delayed Awakening Neg Hx     Post-op Nausea/Vomiting Neg Hx     Emergence Delirium Neg Hx     Post-op Cognitive Dysfunction Neg Hx     Other Neg Hx        Social History:  Social History     Tobacco Use    Smoking status: Never Smoker    Smokeless tobacco: Never Used   Substance Use Topics    Alcohol use: Yes     Comment: month    Drug use: No       Allergies:  No Known Allergies      Review of Systems   Review of Systems   Respiratory: Negative for shortness of breath. Cardiovascular: Negative for chest pain. Musculoskeletal: Positive for arthralgias and myalgias. All other systems reviewed and are negative.         Physical Exam     Vitals:    19 1605 19 1921   BP: (!) 187/105 (!) 170/101   Pulse: 91 90   Resp: 20 20   Temp: 98 °F (36.7 °C) 98 °F (36.7 °C)   SpO2: 100% 96%   Weight: 115.7 kg (255 lb)    Height: 5' 5\" (1.651 m)      Physical Exam    Nursing notes and vital signs reviewed    Constitutional: Non toxic appearing, obese, mild distress  Head: Normocephalic, Atraumatic  Eyes: EOMI  Neck: Supple  Chest: Normal work of breathing and chest excursion bilaterally  Back: No evidence of trauma or deformity  Extremities: Wound over left anterior knee is clean dry and intact with slight bruising on the medial aspects, diffusely tender to palpation, left calf has significant swelling compared to left calf tenderness tender to palpation, distal neurovascular intact  Skin: Warm and dry, normal cap refill  Neuro: Alert and appropriate  Psychiatric: Normal mood and affect      Diagnostic Study Results     Labs -   No results found for this or any previous visit (from the past 12 hour(s)). Radiologic Studies -   XR KNEE LT MIN 4 V    (Results Pending)   X-RAY FINDINGS:  7:25 PM  Left knee x-ray shows postop changes, no acute process  Read by Dr. Ac Chavez, Pending review by Radiologist  CT Results  (Last 48 hours)    None        CXR Results  (Last 48 hours)    None          Medications given in the ED-  Medications - No data to display      Medical Decision Making   I am the first provider for this patient. I reviewed the vital signs, available nursing notes, past medical history, past surgical history, family history and social history. Vital Signs-Reviewed the patient's vital signs. Records Reviewed: Nursing Notes    Provider Notes (Medical Decision Making): Mirela Odom is a 60 y.o. female presenting for left knee pain and swelling 6 days postop from total knee replacements. Doppler negative for DVT and x-ray without acute abnormality. Wound does not look infected on exam but patient reports green discharge at home and is concerned that she needs an antibiotic. Attempted to reach patient's surgeon without success. Will prescribe antibiotic that patient will hold onto until she hears from her surgeon. Will discharge with strict return precautions. Patient understands and agrees with this plan. Procedures:  Procedures    ED Course:   7:24 PM  Updated patient on all results and plan. All questions answered.     Diagnosis and Disposition     Critical Care: None    DISCHARGE NOTE:    Tori Luis  results have been reviewed with her. She has been counseled regarding her diagnosis, treatment, and plan. She verbally conveys understanding and agreement of the signs, symptoms, diagnosis, treatment and prognosis and additionally agrees to follow up as discussed. She also agrees with the care-plan and conveys that all of her questions have been answered. I have also provided discharge instructions for her that include: educational information regarding their diagnosis and treatment, and list of reasons why they would want to return to the ED prior to their follow-up appointment, should her condition change. She has been provided with education for proper emergency department utilization. CLINICAL IMPRESSION:    1. Pain and swelling of left knee        PLAN:  1. D/C Home  2. Current Discharge Medication List      START taking these medications    Details   cephALEXin (KEFLEX) 500 mg capsule Take 1 Cap by mouth three (3) times daily for 7 days. Qty: 21 Cap, Refills: 0           3. Follow-up Information     Follow up With Specialties Details Why Contact Info    Christina Mckee MD Orthopedic Surgery Schedule an appointment as soon as possible for a visit  [address]  [address]      [facility] EMERGENCY DEPT Emergency Medicine  If symptoms worsen [address] [place] [id number]  [phone]        _______________________________      Please note that this dictation was completed with [employer], the computer voice recognition software. Quite often unanticipated grammatical, syntax, homophones, and other interpretive errors are inadvertently transcribed by the computer software. Please disregard these errors. Please excuse any errors that have escaped final proofreading.

## 2019-11-12 NOTE — ED TRIAGE NOTES
Patient states that she had a left total knee last Wednesday. Home health was at her house today and states that her drainage was green, her leg was swollen and she is having increased pain. Patient also reports numbness to the toes on left foot.  Patient sent here by Dr. Stewart Smith

## 2019-11-13 NOTE — DISCHARGE INSTRUCTIONS
Patient Education        Joint Pain: Care Instructions  Your Care Instructions    Many people have small aches and pains from overuse or injury to muscles and joints. Joint injuries often happen during sports or recreation, work tasks, or projects around the home. An overuse injury can happen when you put too much stress on a joint or when you do an activity that stresses the joint over and over, such as using the computer or rowing a boat. You can take action at home to help your muscles and joints get better. You should feel better in 1 to 2 weeks, but it can take 3 months or more to heal completely. Follow-up care is a key part of your treatment and safety. Be sure to make and go to all appointments, and call your doctor if you are having problems. It's also a good idea to know your test results and keep a list of the medicines you take. How can you care for yourself at home? · Do not put weight on the injured joint for at least a day or two. · For the first day or two after an injury, do not take hot showers or baths, and do not use hot packs. The heat could make swelling worse. · Put ice or a cold pack on the sore joint for 10 to 20 minutes at a time. Try to do this every 1 to 2 hours for the next 3 days (when you are awake) or until the swelling goes down. Put a thin cloth between the ice and your skin. · Wrap the injury in an elastic bandage. Do not wrap it too tightly because this can cause more swelling. · Prop up the sore joint on a pillow when you ice it or anytime you sit or lie down during the next 3 days. Try to keep it above the level of your heart. This will help reduce swelling. · Take an over-the-counter pain medicine, such as acetaminophen (Tylenol), ibuprofen (Advil, Motrin), or naproxen (Aleve). Read and follow all instructions on the label. · After 1 or 2 days of rest, begin moving the joint gently.  While the joint is still healing, you can begin to exercise using activities that do not strain or hurt the painful joint. When should you call for help? Call your doctor now or seek immediate medical care if:    · You have signs of infection, such as:  ? Increased pain, swelling, warmth, and redness. ? Red streaks leading from the joint. ? A fever.    Watch closely for changes in your health, and be sure to contact your doctor if:    · Your movement or symptoms are not getting better after 1 to 2 weeks of home treatment. Where can you learn more? Go to http://kiarra-ruy.info/. Enter P205 in the search box to learn more about \"Joint Pain: Care Instructions. \"  Current as of: June 26, 2019  Content Version: 12.2  © 9392-2401 [employer]. Care instructions adapted under license by [employer] (which disclaims liability or warranty for this information). If you have questions about a medical condition or this instruction, always ask your healthcare professional. [place] [address] any warranty or liability for your use of this information. Patient Education        Total Knee Replacement: What to Expect at Home  Your Recovery    When you leave the hospital, you should be able to move around with a walker or crutches. But you will need someone to help you at home for the next few weeks or until you have more energy and can move around better. If you need more extensive rehab, you may go to a specialized rehab center for more treatment. You will go home with a bandage and stitches, staples, tissue glue, or tape strips. Change the bandage as your doctor tells you to. If you have stitches or staples, your doctor will remove them 10 to 21 days after your surgery. Glue or tape strips will fall off on their own over time. You may still have some mild pain, and the area may be swollen for 3 to 6 months after surgery. Your knee will continue to improve for 6 to 12 months.  You will probably use a walker for 1 to 3 weeks and then use crutches. When you are ready, you can use a cane. You will probably be able to walk on your own in 4 to 8 weeks. You will need to do months of physical rehabilitation (rehab) after a knee replacement. Rehab will help you strengthen the muscles of the knee and help you regain movement. After you recover, your artificial knee will allow you to do normal daily activities with less pain or no pain at all. You may be able to hike, dance, ride a bike, and play golf. Talk to your doctor about whether you can do more strenuous activities. Always tell your caregivers that you have an artificial knee. How long it will take to walk on your own, return to normal activities, and go back to work depends on your health and how well your rehabilitation (rehab) program goes. The better you do with your rehab exercises, the quicker you will get your strength and movement back. This care sheet gives you a general idea about how long it will take for you to recover. But each person recovers at a different pace. Follow the steps below to get better as quickly as possible. How can you care for yourself at home? Activity    · Rest when you feel tired. You may take a nap, but do not stay in bed all day. When you sit, use a chair with arms. You can use the arms to help you stand up.     · Work with your physical therapist to find the best way to exercise. What you can do as your knee heals will depend on whether your new knee is cemented or uncemented. You may not be able to do certain things for a while if your new knee is uncemented.     · After your knee has healed enough, you can do more strenuous activities with caution. ? You can golf, but use a golf cart, and do not wear shoes with spikes. ? You can bike on a flat road or on a stationary bike. Avoid biking up hills. ? Your doctor may suggest that you stay away from activities that put stress on your knee.  These include tennis or badminton, squash or racquetball, contact sports like football, jumping (such as in basketball), jogging, or running. ? Avoid activities where you might fall. These include horseback riding, skiing, and mountain biking.     · Do not sit for more than 1 hour at a time. Get up and walk around for a while before you sit again. If you must sit for a long time, prop up your leg with a chair or footstool. This will help you avoid swelling.     · Ask your doctor when you can drive again. It may take up to 8 weeks after knee replacement surgery before it is safe for you to drive.     · When you get into a car, sit on the edge of the seat. Then pull in your legs, and turn to face the front.     · You should be able to do many everyday activities 3 to 6 weeks after your surgery. You will probably need to take 4 to 16 weeks off from work. When you can go back to work depends on the type of work you do and how you feel.     · Ask your doctor when it is okay for you to have sex.     · Do not lift anything heavier than 10 pounds and do not lift weights for 12 weeks. Diet    · By the time you leave the hospital, you should be eating your normal diet. If your stomach is upset, try bland, low-fat foods like plain rice, broiled chicken, toast, and yogurt. Your doctor may suggest that you take iron and vitamin supplements.     · Drink plenty of fluids (unless your doctor tells you not to).   · Eat healthy foods, and watch your portion sizes. Try to stay at your ideal weight. Too much weight puts more stress on your new knee.     · You may notice that your bowel movements are not regular right after your surgery. This is common. Try to avoid constipation and straining with bowel movements. You may want to take a fiber supplement every day. If you have not had a bowel movement after a couple of days, ask your doctor about taking a mild laxative. Medicines    · Your doctor will tell you if and when you can restart your medicines.  He or she will also give you instructions about taking any new medicines.     · If you take blood thinners, such as warfarin (Coumadin), clopidogrel (Plavix), or aspirin, be sure to talk to your doctor. He or she will tell you if and when to start taking those medicines again. Make sure that you understand exactly what your doctor wants you to do.     · Your doctor may give you a blood-thinning medicine to prevent blood clots. If you take a blood thinner, be sure you get instructions about how to take your medicine safely. Blood thinners can cause serious bleeding problems. This medicine could be in pill form or as a shot (injection). If a shot is necessary, your doctor will tell you how to do this.     · Be safe with medicines. Take pain medicines exactly as directed. ? If the doctor gave you a prescription medicine for pain, take it as prescribed. ? If you are not taking a prescription pain medicine, ask your doctor if you can take an over-the-counter medicine. ? Plan to take your pain medicine 30 minutes before exercises. It is easier to prevent pain before it starts than to stop it once it has started.     · If you think your pain medicine is making you sick to your stomach:  ? Take your medicine after meals (unless your doctor has told you not to). ? Ask your doctor for a different pain medicine.     · If your doctor prescribed antibiotics, take them as directed. Do not stop taking them just because you feel better. You need to take the full course of antibiotics. Incision care    · If your doctor told you how to care for your cut (incision), follow your doctor's instructions. You will have a dressing over the cut. A dressing helps the incision heal and protects it. Your doctor will tell you how to take care of this.     · If you did not get instructions, follow this general advice:  ?  If you have strips of tape on the cut the doctor made, leave the tape on for a week or until it falls off.  ? If you have stitches or staples, your doctor will tell you when to come back to have them removed. ? If you have skin adhesive on the cut, leave it on until it falls off. Skin adhesive is also called glue or liquid stitches. ? Change the bandage every day. ? Wash the area daily with warm water, and pat it dry. Don't use hydrogen peroxide or alcohol. They can slow healing. ? You may cover the area with a gauze bandage if it oozes fluid or rubs against clothing. ? You may shower 24 to 48 hours after surgery. Pat the incision dry. Don't swim or take a bath for the first 2 weeks, or until your doctor tells you it is okay. Exercise    · Your rehab program will give you a number of exercises to do to help you get back your knee's range of motion and strength. Always do them as your therapist tells you. Ice and elevation    · For pain and swelling, put ice or a cold pack on the area for 10 to 20 minutes at a time. Put a thin cloth between the ice and your skin. Other instructions    · Continue to wear your support stockings as your doctor says. These help to prevent blood clots. The length of time that you will have to wear them depends on your activity level and the amount of swelling.     · You have metal pieces in your knee. These may set off some airport metal detectors. Carry a medical alert card that says you have an artificial joint, just in case. Follow-up care is a key part of your treatment and safety. Be sure to make and go to all appointments, and call your doctor if you are having problems. It's also a good idea to know your test results and keep a list of the medicines you take. When should you call for help? Call 911 anytime you think you may need emergency care.  For example, call if:    · You passed out (lost consciousness).     · You have severe trouble breathing.     · You have sudden chest pain and shortness of breath, or you cough up blood.    Call your doctor now or seek immediate medical care if:    · You have signs of infection, such as:  ? Increased pain, swelling, warmth, or redness. ? Red streaks leading from the incision. ? Pus draining from the incision. ? A fever.     · You have signs of a blood clot, such as:  ? Pain in your calf, back of the knee, thigh, or groin. ? Redness and swelling in your leg or groin.     · Your incision comes open and begins to bleed, or the bleeding increases.     · You have pain that does not get better after you take pain medicine.    Watch closely for changes in your health, and be sure to contact your doctor if:    · You do not have a bowel movement after taking a laxative. Where can you learn more? Go to http://kiarra-ruy.info/. Enter K588 in the search box to learn more about \"Total Knee Replacement: What to Expect at Home. \"  Current as of: June 26, 2019  Content Version: 12.2  © 6888-7134 "[employer]. Care instructions adapted under license by [employer] (which disclaims liability or warranty for this information). If you have questions about a medical condition or this instruction, always ask your healthcare professional. [place] [address] any warranty or liability for your use of this information.

## 2019-11-21 ENCOUNTER — CLINICAL SUPPORT (OUTPATIENT)
Dept: SURGERY | Age: 64
End: 2019-11-21

## 2019-11-21 VITALS — WEIGHT: 256 LBS | BODY MASS INDEX: 42.65 KG/M2 | HEIGHT: 65 IN

## 2019-11-21 DIAGNOSIS — E66.01 MORBID OBESITY (HCC): Primary | ICD-10-CM

## 2019-11-21 NOTE — PROGRESS NOTES
Medical Weight Loss Multi-Disciplinary Program    Name: Dominguez Chowdary   : 1955    Session# 3  Date: 2019     Height: 5' 5\" (165.1 cm)    Weight: 116.1 kg (256 lb) lbs. Body mass index is 42.6 kg/m². Pounds Lost: 0 Pounds Gained: 1 lbs    Patient underwent total knee replacement 2019 - she is mobile with rollator at today's session. Dietary Instructions    Reviewed intake  Understanding low carbohydrates, low sugar, higher protein meals  Understanding proper portions  Instruction given for personal dietary changes  Discussed perceived compliance  Comments:Patient does not that she relied more on neighbors and friends to assist with meal planning this month, as she is not able to stand on her leg to cook. Reinforced key diet principles following surgery, patient has fair understanding and is able to teach back concepts. Reviewed diet progression guide with portions following surgery, discussed week 1-2 liquid diet and weeks 3-4 soft protein diet. Reinforced importance of adequate hydration and protein intake. Answered patient specific questions, reviewed daily schedule, shopping list, and behavior modifications following surgery. Typical intake is as follows:  Breakfast: 2 hard boiled egg   Lunch: Cucumber, red onion, celery,  1/4 cup berries and adding 1 oz diced chicken OR Chicken noodle soup   Snack: String cheese, OR Nabs crackers   Dinner: Chicken (fried, stir mckeon, baked) - OR SKIPS   Snacks: Working to avoid snacking at night after dinner and in the middle of the night   Fluids: Eliminated diet soda, and juicing (wants to restart juicing vegetables), 48 oz water, 3 cups of coffee, 1 premier protein shake     Physical Activity/Exercise    Discussed Perceived Compliance  Reasonable Goals Set  Comments: Patient restarted physical therapy this month at home and will be going to physical therapy in the office starting next week.  She is not cleared to return to aquatic therapy/ann until she is cleared by surgeon     Goals:   Goals:  1. Continue working to consistently eat three meals a day - using your protein shake and your protein water as a meal replacement or snack (so you can drink up to 2 a day)              - for each meal focus on                           - 3-5 ounces of protein at each meal (1 ounce = 1/4 cup, so around 3/4 - 1 cup of protein or a piece of protein the size of the palm of your hand)                          - 1-2 servings of non-starchy vegetables (1/2 - 1 cup, avoid peas, corn and potatoes those are starchy vegetables - if you do eat those measure them out                      to only 1/2 cup serving a day = 15 grams of carbohydrates)                           - Carbohydrates: 50-75 grams or less per day - any time you have a carbohydrate subtract that from your total for the day               - for snacks: make sure your snacks provide at least 100-200 calories - focusing on lean protein and fiber      2. Daily food tracking: use an donald like [employer] to track your daily intake      3. Work on meal planning/prepping - focus on midday meal and evening.     4. Continue your current exercise routine of water aerboics 3-4 days per week for 1-2 hours per day.      5. Increase non caloric fluid to 64 oz per day. Eliminate caffeine, added sugar, carbonation, and straws.              - non-caloric fluid is any kind of beverage that is sugar-free/diet, decaf and non-carbonated      6. . Carbohydrates:  Pick complex carbohydrates (whole grain starches) over simple carbohydrates (white starches) and remember to measure them out, following the measurements below:  Carbohydrate foods include:  · Fruit, Grains, Starchy vegetables  One serving of a carbohydrate food = 15 grams of carbohydrate and 60-80 calories.   Also, sugar and sweets in all forms (regular white sugar, brown sugar, sugar in the raw, honey, syrup, agave nectar, molasses, regular flavored coffee creamer, etc.), are carbohydrates. You are working to eliminate added sugar in your diet prior to surgery. In general:  1 serving of  fruit = ½ cup fruit (chopped, grapes, or berries), ½ cup canned fruit in natural juice or water (discard fluid), 1 small fresh fruit, ½ banana, 2 tablespoons dried fruit, 4 oz 100% fruit juice  1 serving of starchy vegetables = ½ cup cooked (most common are potatoes of all kinds, corn, or green peas)  1 serving of grains (these vary the most so read labels and use computer/phone application if able!) =   · -Bread: 1 slice small sandwich bread, ¼ large bagel, ½ English muffin, ½ hot dog or hamburger bun, 1 small roll, 1 (6in across) tortilla  · Cereal: ¼ cup granola, ½ cup cooked oatmeal, ½ cup cooked cream of wheat, ½ cup cooked grits, ½ cup cereal   · Pasta/Rice/Quinoa/Barley: 1/3 cup cooked pasta, 1/3 cup cooked couscous, 1/3 cup cooked rice, ½ cup cooked wild rice, 1/3 cup cooked quinoa, 1/3 cup cooked barley - ALWAYS MEASURE YOUR GRAINS AFTER THEY'VE BEEN COOKED      Candidate for surgery (per RD): PENDING   Harvinder Donaldson RD

## 2019-12-30 ENCOUNTER — CLINICAL SUPPORT (OUTPATIENT)
Dept: SURGERY | Age: 64
End: 2019-12-30

## 2019-12-30 VITALS — WEIGHT: 252 LBS | HEIGHT: 65 IN | BODY MASS INDEX: 41.99 KG/M2

## 2019-12-30 DIAGNOSIS — E66.01 MORBID OBESITY (HCC): Primary | ICD-10-CM

## 2019-12-30 NOTE — PROGRESS NOTES
Medical Weight Loss Multi-Disciplinary Program    Name: Anton Montana   : 1955    Session# 4  Date: 2019     Height: 5' 5\" (165.1 cm)    Weight: 114.3 kg (252 lb) lbs. Body mass index is 41.93 kg/m². Pounds Lost: 4 lbs  Consult weight 257 lbs     Dietary Instructions    Reviewed intake  Understanding low carbohydrates, low sugar, higher protein meals  Understanding proper portions  Instruction given for personal dietary changes  Discussed perceived compliance  Comments: Reviewed recommendation to follow [id number] calorie diet, working to reduce total carbohydrate intake to 50 g or less per day and increasing protein intake to  g per day, compared current intake to recommendations. Reinforced key diet principles following surgery, patient has fair understanding and is able to teach back concepts. Reviewed diet progression guide with portions following surgery, discussed week 1-2 liquid diet and weeks 3-4 soft protein diet. Reinforced importance of adequate hydration and protein intake. Answered patient specific questions, reviewed daily schedule, shopping list, and behavior modifications following surgery. Typical intake is as follows:  Breakfast: 2 hard boiled egg with 1 sausage link OR gallo OR Sausage gallo with with egg and white cheddar cheese with 1/2 english muffin    Lunch: Cucumber, red onion, celery,  1/4 cup berries and adding 1 oz diced chicken   Snack: String cheese OR crackers with cheese   Dinner: Premier protein shake   Snacks: Working to avoid snacking at night after dinner and in the middle of the night - she will often drink 1/2 shake to assist with hunger  Fluids:  64 oz water, 1-2 cups of coffee, 1 premier protein shake     Physical Activity/Exercise    Discussed Perceived Compliance  Reasonable Goals Set  Comments: Patient is in physical therapy for 60 minutes - 3 days per week.  She is not cleared to return to aquatic therapy/ann until she is cleared by surgeon       Candidate for surgery (per RD):YES  Andrei Amador RD

## 2020-06-22 ENCOUNTER — VIRTUAL VISIT (OUTPATIENT)
Dept: SURGERY | Age: 65
End: 2020-06-22

## 2020-06-22 VITALS — BODY MASS INDEX: 43.82 KG/M2 | WEIGHT: 263 LBS | HEIGHT: 65 IN

## 2020-06-22 DIAGNOSIS — M19.90 ARTHRITIS: ICD-10-CM

## 2020-06-22 DIAGNOSIS — I74.9 THROMBOEMBOLUS (HCC): ICD-10-CM

## 2020-06-22 DIAGNOSIS — I10 HYPERTENSION, UNSPECIFIED TYPE: ICD-10-CM

## 2020-06-22 DIAGNOSIS — E66.01 MORBID OBESITY (HCC): Primary | ICD-10-CM

## 2020-06-22 DIAGNOSIS — K90.9 INTESTINAL MALABSORPTION, UNSPECIFIED TYPE: ICD-10-CM

## 2020-06-22 DIAGNOSIS — I26.99 PULMONARY EMBOLISM, UNSPECIFIED CHRONICITY, UNSPECIFIED PULMONARY EMBOLISM TYPE, UNSPECIFIED WHETHER ACUTE COR PULMONALE PRESENT (HCC): ICD-10-CM

## 2020-06-22 DIAGNOSIS — Z98.84 STATUS POST GASTRIC BYPASS FOR OBESITY: ICD-10-CM

## 2020-06-22 RX ORDER — CYCLOBENZAPRINE HCL 10 MG
TABLET ORAL
COMMUNITY
End: 2020-11-03

## 2020-06-22 RX ORDER — DICLOFENAC POTASSIUM 50 MG/1
50 TABLET, FILM COATED ORAL 3 TIMES DAILY
COMMUNITY

## 2020-06-22 NOTE — PATIENT INSTRUCTIONS
Preparation for Surgery Refer to your book for specific instructions 1. Stop taking all aspirin products, ibuprofen products, non-steroidal medications, blood thinners, and herbal supplements as outlined in your book. 2. Absolutely no smoking. 3. If diabetic, monitor blood sugars regularly and alert the office of blood sugars over 200. 
 
4. Have a supply of protein product and liquid diet items for your first two weeks as outlined in your book. 5. The day before your surgery is scheduled: 
 
                Gastric Bypass and Sleeve Gastrectomy:  Clear liquids only. Gastric Band:  Eat lightly, No snacking. Every patient, Drink plenty of water.

## 2020-06-22 NOTE — PROGRESS NOTES
Bariatric Surgery Consultation  Video conference due to CV-19 crisis    Subjective:     Florentin Bates is a 63 y.o. obese female with a Body mass index is 43.77 kg/m². Khanh De Los Santos she desires surgery at this time because of health issues and quality of life issues. Florentin Bates has tried multiple diets in her lifetime most recently tried physician supervised, behavior modification and unsupervised diets. She had a gastric bypass in [place] in     Bariatric comorbidities present are   Patient Active Problem List   Diagnosis Code    Spondylolisthesis of lumbar region M43.16    DDD (degenerative disc disease), lumbar M51.36    Status post lumbar surgery Z98.890    Hypertension I10    Chronic pain G89.29    Arthritis M19.90    Thromboembolus ([address] I74.9    Intestinal malabsorption K90.9    Morbid obesity with BMI of 40.0-44.9, adult ([address] E66.01, Z68.41    Morbid obesity ([address] E66.01    Arthritis of left knee M17.12    Pulmonary embolism (HCC) I26.99    Status post gastric bypass for obesity Z98.84     The patient desires laparoscopic adjustable gastric band surgery for surgical weight loss. Florentin Bates is here today to check progress with weight loss / evaluate nutritional status and review all subspecialty clearances in hopes of proceeding to the operating room.      Patient Active Problem List    Diagnosis Date Noted    Intestinal malabsorption 2019    Hypertension     Chronic pain     Arthritis     Morbid obesity with BMI of 40.0-44.9, adult ([address]     Morbid obesity ([address]     Arthritis of left knee     Status post lumbar surgery 11/15/2017    Spondylolisthesis of lumbar region 2012    DDD (degenerative disc disease), lumbar 2012    Status post gastric bypass for obesity     Thromboembolus ([address] 1977    Pulmonary embolism ([address]       Past Surgical History:   Procedure Laterality Date    HX  SECTION      X 2    HX GASTRIC BYPASS    HX KNEE REPLACEMENT Left 11/2019    Dr. Kelly Schafer    HX ORTHOPAEDIC  4-12    L4-L5 surgery    HX ROTATOR CUFF REPAIR Left     HX TUBAL LIGATION        Social History     Tobacco Use    Smoking status: Never Smoker    Smokeless tobacco: Never Used   Substance Use Topics    Alcohol use: Yes     Comment: month      Family History   Problem Relation Age of Onset    Diabetes Mother     Diabetes Sister     Diabetes Brother     Diabetes Sister     Diabetes Sister     Malignant Hyperthermia Neg Hx     Pseudocholinesterase Deficiency Neg Hx     Delayed Awakening Neg Hx     Post-op Nausea/Vomiting Neg Hx     Emergence Delirium Neg Hx     Post-op Cognitive Dysfunction Neg Hx     Other Neg Hx       Current Outpatient Medications   Medication Sig Dispense Refill    cyclobenzaprine (FLEXERIL) 10 mg tablet Take  by mouth three (3) times daily as needed for Muscle Spasm(s).  diclofenac potassium (CATAFLAM) 50 mg tablet Take 50 mg by mouth three (3) times daily.  aspirin (ASPIRIN) 325 mg tablet Take 2 pills 2 x day for 6 weeks 120 Tab 1    ergocalciferol (ERGOCALCIFEROL) 50,000 unit capsule Take 1 cap by mouth twice weekly for 1 year 52 Cap 1    calcium carbonate (TUMS) 200 mg calcium (500 mg) chew Take 2 Tabs by mouth daily.  losartan (COZAAR) 100 mg tablet Take 100 mg by mouth daily. Indications: hypertension      amLODIPine (NORVASC) 10 mg tablet Take 10 mg by mouth daily. Indications: hypertension      multivitamin (ONE A DAY) tablet Take 1 Tab by mouth daily.  naloxone (NARCAN) 4 mg/actuation nasal spray Use 1 spray intranasally, then discard. Repeat with new spray every 2 min as needed for opioid overdose symptoms, alternating nostrils.  1 Each 0     No Known Allergies       Review of Systems:        General - No history or complaints of unexpected fever, chills, or weight loss  Head/Neck - No history or complaints of headache, diplopia, dysphagia, hearing loss  Cardiac - No history or complaints of chest pain, palpitations, murmur, or shortness of breath  Pulmonary - No history or complaints of shortness of breath, productive cough, hemoptysis  Gastrointestinal - No history or complaints of reflux,  abdominal pain, obstipation/constipation, blood per rectum  Genitourinary - No history or complaints of hematuria/dysuria, stress urinary incontinence symptoms, or renal lithiasis  Musculoskeletal - No history or complaints of joint pain or muscular weakness  Hematologic - No history or complaints of bleeding disorders, blood transfusions, sickle cell anemia  Neurologic - No history or complaints of  migraine headaches, seizure activity, syncopal episodes, TIA or stroke  Integumentary - No history or complaints of rashes, abnormal nevi, skin cancer  Gynecological - No history of heavy menses/abnormal menses    Objective:     Visit Vitals  Ht 5' 5\" (1.651 m)   Wt 119.3 kg (263 lb)   BMI 43.77 kg/m²     Physical Examination: General appearance - alert, well appearing, and in no distress and oriented to person, place, and time  Mental status - alert, oriented to person, place, and time, normal mood, behavior, speech, dress, motor activity, and thought processes  Eyes - pupils equal and reactive, extraocular eye movements intact, sclera anicteric, left eye normal, right eye normal  Ears - right ear normal, left ear normal  Neck- good extension and flexion, no obvious swelling  Chest - good air movement  Heart - N/A  Abdomen - no obvious distension, scars as noted:   Neurological - alert, oriented, normal speech, no focal findings or movement disorder noted  Musculoskeletal - no swelling noted  Extremities - normal movement      Labs:             Assessment:     Morbid obesity with associated comorbidity    Plan:     Continuation of Pre-Operative evaluation / clearance.     Yessica Kamara has returned to the office today to discuss her status as a surgical candidate.  her progress has been noted and reviewed. We will continue the pre-operative process and work towards goals as outlined. she has NA more nutritional visits to complete before proceeding to the OR  she has an outstanding psych clearance to review before proceeding to the OR. Usman Delgadillo understand the rationales for all the above. It has been discussed that given her post bypass condition that the best surgical option for this patient would be the laparoscopic adjustable gastric band surgery. Usman Delgadillo agrees with the surgical choice and has been educated in it's; risks, benefits, and alternatives. We will continue with the pre-operative evaluation as needed to check progress. The patient understands the plan of action    Since her consult in August 2019 she has had a total knee replacement by Dr. Jefferson Francois in November - she did not obtain a cardiac clearance prior to that surgery and states she had no problems with her GETA. She understands the absolute need to get her psych evaluation as soon as possible     Secondary Diagnoses:     Dietary Intervention  - The patient is currently scheduled to see or has been followed by a bariatric nutritionist for an attempt at preoperative weight loss as has been dictated by their insurance carrier. They will be assessed at various times during their follow up to evaluate their progress depending on the length of time that is required once again by their carrier. I have explained the importance of preoperative weight loss and the benefits regarding lower surgical risk and also assisting the patient in reaching their weight loss goal.  Finally they understand their is a physiologic benefit from the standpoint of hepatic volume reduction preoperatively.   I have reiterated the importance of a low carbohydrate and high protein regimen to achieve their stated goal.    Hypertension - The patient has a clear understanding of how weight loss improves hypertension as a whole, but also they understand that there is a significant genetic component to this disease process. We will monitor the patients blood pressure while in the hospital and the plan would be to continue those medications postoperatively.  If a diuretic is being used we will stop them on discharge to prevent dehydration particularly with the sleeve gastrectomy and the gastric bypass procedures.  They will be instructed to monitor their blood pressure postoperatively while at home and notify their primary care physician in the event of any significantly high or uncharacteristic readings. Weight Related Arthritis -The patient understands the benefits that weight loss surgery can have on their arthritis but also understands that weight loss is not a guaranteed cure and relief of symptoms is often dependent on the severity of the underlying disease.  The patient also understands that traditional pharmaceutical treatments for this diagnosis are usually unavailable to post-operative weight loss patients due to the effects on the gastrointestinal tract particularly with the gastric bypass and to a lesser effect with the sleeve gastrectomy.  Any changes to the patients medication treatment will ultimately be made the patients PCP with input by our office. This visit with YANIQUE Mckinney was performed under virtual telemedicine guidelines during the coronavirus (YMPZY-11) public health emergency on 6/22/2020 in a video encounter. They understand that this encounter could be a billable service, with coverage determined by their insurance carrier. They are aware that   they may receive a bill and have provided verbal consent for this visit. This visit was performed with the patient in their home environment and provider was   present at [facility]. I have spent over 30 minutes on this visit  both prior to the visits reviewing the patients chart and with the patient oh the phone.    I have reviewed their medical history and discussed the plan of action to date. They understand that they will be asked   to come to the office when our office is allowed normal patient interaction, as dictated by public health officials, for a face-to-face visit to rediscuss all of the things we  have talked about today.   During this visit we discussed the varieties of surgeries that we perform, how they would impact the patient from a weight loss standpoint   considering their medical issues and prior surgeries, and also the restrictions that the patient would have long-term with the operation that they have chosen      Signed By: LILA Mcgee     June 22, 2020

## 2020-08-03 NOTE — ANESTHESIA PREPROCEDURE EVALUATION
Relevant Problems   No relevant active problems       Anesthetic History   No history of anesthetic complications            Review of Systems / Medical History  Patient summary reviewed, nursing notes reviewed and pertinent labs reviewed    Pulmonary  Within defined limits                 Neuro/Psych   Within defined limits           Cardiovascular    Hypertension          Hyperlipidemia (not compliant with meds)    Exercise tolerance: >4 METS     GI/Hepatic/Renal  Within defined limits              Endo/Other        Morbid obesity and arthritis     Other Findings              Physical Exam    Airway  Mallampati: II  TM Distance: 4 - 6 cm  Neck ROM: normal range of motion   Mouth opening: Normal     Cardiovascular               Dental  No notable dental hx       Pulmonary                 Abdominal         Other Findings            Anesthetic Plan    ASA: 3  Anesthesia type: general      Post-op pain plan if not by surgeon: peripheral nerve block single    Induction: Intravenous  Anesthetic plan and risks discussed with: Patient and Spouse      Adductor Canal Block - risks/benefits explained: infection, nerve injury, bleeding, failed block - she wishes to proceed.
neurologic/cardiovascular

## 2020-10-14 RX ORDER — ERGOCALCIFEROL 1.25 MG/1
CAPSULE ORAL
Qty: 8 CAP | Refills: 0 | Status: SHIPPED | OUTPATIENT
Start: 2020-10-14

## 2020-10-21 DIAGNOSIS — I10 HYPERTENSION, UNSPECIFIED TYPE: Primary | ICD-10-CM

## 2020-10-21 DIAGNOSIS — Z98.84 BARIATRIC SURGERY STATUS: ICD-10-CM

## 2020-10-21 DIAGNOSIS — E66.01 MORBID OBESITY (HCC): ICD-10-CM

## 2020-10-21 DIAGNOSIS — Z01.812 BLOOD TESTS PRIOR TO TREATMENT OR PROCEDURE: ICD-10-CM

## 2020-10-21 DIAGNOSIS — I10 HYPERTENSION, UNSPECIFIED TYPE: ICD-10-CM

## 2020-10-26 ENCOUNTER — OFFICE VISIT (OUTPATIENT)
Dept: SURGERY | Age: 65
End: 2020-10-26
Payer: MEDICARE

## 2020-10-26 VITALS
BODY MASS INDEX: 46.25 KG/M2 | WEIGHT: 277.6 LBS | SYSTOLIC BLOOD PRESSURE: 132 MMHG | HEART RATE: 64 BPM | OXYGEN SATURATION: 99 % | DIASTOLIC BLOOD PRESSURE: 87 MMHG | HEIGHT: 65 IN

## 2020-10-26 DIAGNOSIS — G89.18 POSTOPERATIVE PAIN: ICD-10-CM

## 2020-10-26 DIAGNOSIS — I10 HYPERTENSION, UNSPECIFIED TYPE: ICD-10-CM

## 2020-10-26 DIAGNOSIS — E66.01 MORBID OBESITY WITH BMI OF 40.0-44.9, ADULT (HCC): ICD-10-CM

## 2020-10-26 DIAGNOSIS — Z98.84 STATUS POST GASTRIC BYPASS FOR OBESITY: ICD-10-CM

## 2020-10-26 DIAGNOSIS — M17.12 ARTHRITIS OF LEFT KNEE: ICD-10-CM

## 2020-10-26 DIAGNOSIS — E66.01 MORBID OBESITY (HCC): Primary | ICD-10-CM

## 2020-10-26 DIAGNOSIS — M43.16 SPONDYLOLISTHESIS OF LUMBAR REGION: ICD-10-CM

## 2020-10-26 DIAGNOSIS — M19.90 ARTHRITIS: ICD-10-CM

## 2020-10-26 DIAGNOSIS — M51.36 DDD (DEGENERATIVE DISC DISEASE), LUMBAR: ICD-10-CM

## 2020-10-26 DIAGNOSIS — Z98.890 STATUS POST LUMBAR SURGERY: ICD-10-CM

## 2020-10-26 DIAGNOSIS — I26.99 PULMONARY EMBOLISM, UNSPECIFIED CHRONICITY, UNSPECIFIED PULMONARY EMBOLISM TYPE, UNSPECIFIED WHETHER ACUTE COR PULMONALE PRESENT (HCC): ICD-10-CM

## 2020-10-26 DIAGNOSIS — K90.9 INTESTINAL MALABSORPTION, UNSPECIFIED TYPE: ICD-10-CM

## 2020-10-26 PROCEDURE — G8754 DIAS BP LESS 90: HCPCS | Performed by: SPECIALIST

## 2020-10-26 PROCEDURE — G8432 DEP SCR NOT DOC, RNG: HCPCS | Performed by: SPECIALIST

## 2020-10-26 PROCEDURE — 3017F COLORECTAL CA SCREEN DOC REV: CPT | Performed by: SPECIALIST

## 2020-10-26 PROCEDURE — G8417 CALC BMI ABV UP PARAM F/U: HCPCS | Performed by: SPECIALIST

## 2020-10-26 PROCEDURE — G8752 SYS BP LESS 140: HCPCS | Performed by: SPECIALIST

## 2020-10-26 PROCEDURE — G8427 DOCREV CUR MEDS BY ELIG CLIN: HCPCS | Performed by: SPECIALIST

## 2020-10-26 PROCEDURE — 99215 OFFICE O/P EST HI 40 MIN: CPT | Performed by: SPECIALIST

## 2020-10-26 RX ORDER — ENOXAPARIN SODIUM 100 MG/ML
40 INJECTION SUBCUTANEOUS EVERY 12 HOURS
Qty: 14 SYRINGE | Refills: 0
Start: 2020-10-26 | End: 2020-11-03

## 2020-10-26 RX ORDER — MAGNESIUM 200 MG
1000 TABLET ORAL DAILY
COMMUNITY

## 2020-10-26 RX ORDER — OXYCODONE AND ACETAMINOPHEN 5; 325 MG/1; MG/1
1 TABLET ORAL
Qty: 30 TAB | Refills: 0 | Status: SHIPPED | OUTPATIENT
Start: 2020-10-26 | End: 2020-10-31

## 2020-10-26 NOTE — H&P (VIEW-ONLY)
Lap Band over Bypass - History and Physical 
 
Subjective: The patient is a 63 y.o. obese female with a Body mass index is 46.2 kg/m². She had a laparoscopic gastric bypass done in 2007 in [place].   she presents now to review their work up to date to see if they are a candidate for surgery and whether or not to proceed with the previously requested procedure. Bariatric comorbidities continue to include:  
Patient Active Problem List  
Diagnosis Code  Spondylolisthesis of lumbar region M43.16  
 DDD (degenerative disc disease), lumbar M51.36  
 Status post lumbar surgery Z98.890  
 Hypertension I10  Chronic pain G89.29  
 Arthritis M19.90  Thromboembolus ([address] I74.9  Intestinal malabsorption K90.9  Morbid obesity with BMI of 40.0-44.9, adult (HCC) E66.01, Z68.41  
 Morbid obesity (HCC) E66.01  
 Arthritis of left knee M17.12  
 Pulmonary embolism ([facility]) I26.99  
 Status post gastric bypass for obesity Z98.84 They have been generally well prior to this visit and have had no recent significant illnesses. The patient has had no gastrointestinal issues that would preclude them from proceeding with the surgery they have chosen. Johnathan Boo has recently tried a preoperative weight loss program  in addition to seeing a bariatric nutritionist preoperatively. We have discussed on at least one other occasion about the various types of surgical weight loss procedures and they have considered these options after our initial consultation. We have once again discussed these procedures in detail and they have now decided on a surgical procedure. They present today to discuss this and confirm that their evaluation pre operatively is acceptable to continue with surgery. The patient desires laparoscopic adjustable gastric band surgery over gastric bypassfor surgical weight loss. The patients goal weight is 175 lb. (this represents a BMI of 29) These goals are consistent with expected outcomes of their desired operation. her Medical goals are resolution of these health issues. Since the patient's original consult 14 months ago they have been seen by their PCP for routine medical care. There has been no change to their overall medical or surgical history and they have been on no steroids in any form. 
  
She has gained 22 lbs over the past 14 months 
  
UGI was normal  
 
 
Past Medical History:  
Diagnosis Date  Arthritis   
 osteo-lower back  Arthritis of left knee  Chronic pain   
 back  Hypertension 10 years  Intestinal malabsorption  Morbid obesity ([address]  Morbid obesity with BMI of 40.0-44.9, adult ([address]  Pulmonary embolism ([address]   Status post gastric bypass for obesity  [place]  [facility]rom[address] [facility] [facility]  Past Surgical History:  
Procedure Laterality Date  HX  SECTION    
 X 2  
 HX GASTRIC BYPASS  2005  HX KNEE REPLACEMENT Left 2019 Dr. Lennie Hoyos  HX ORTHOPAEDIC  4-12 L4-L5 surgery  HX ROTATOR CUFF REPAIR Left  HX TUBAL LIGATION Social History Tobacco Use  Smoking status: Never Smoker  Smokeless tobacco: Never Used Substance Use Topics  Alcohol use: Yes Comment: month Family History Problem Relation Age of Onset  Diabetes Mother  Diabetes Sister  Diabetes Brother  Diabetes Sister  Diabetes Sister  Malignant Hyperthermia Neg Hx  Pseudocholinesterase Deficiency Neg Hx  Delayed Awakening Neg Hx  Post-op Nausea/Vomiting Neg Hx  Emergence Delirium Neg Hx  Post-op Cognitive Dysfunction Neg Hx   
 Other Neg Hx Current Outpatient Medications Medication Sig Dispense Refill  cyanocobalamin (VITAMIN B-12) 1,000 mcg sublingual tablet Take 1,000 mcg by mouth daily.  elderberry fruit (ELDERBERRY PO) Take  by mouth.  oxyCODONE-acetaminophen (PERCOCET) 5-325 mg per tablet Take 1 Tab by mouth every four (4) hours as needed for Pain for up to 5 days. Max Daily Amount: 6 Tabs. 30 Tab 0  
 enoxaparin (Lovenox) 40 mg/0.4 mL 0.4 mL by SubCUTAneous route every twelve (12) hours every twelve (12) hours. Indications: deep vein thrombosis prevention 14 Syringe 0  
 ergocalciferol (Vitamin D2) 1,250 mcg (50,000 unit) capsule TAKE 1 CAPSULE BY MOUTH TWICE A WEEK 8 Cap 0  cyclobenzaprine (FLEXERIL) 10 mg tablet Take  by mouth three (3) times daily as needed for Muscle Spasm(s).  diclofenac potassium (CATAFLAM) 50 mg tablet Take 50 mg by mouth three (3) times daily.  calcium carbonate (TUMS) 200 mg calcium (500 mg) chew Take 2 Tabs by mouth daily.  losartan (COZAAR) 100 mg tablet Take 100 mg by mouth daily. Indications: hypertension  amLODIPine (NORVASC) 10 mg tablet Take 10 mg by mouth daily. Indications: hypertension  multivitamin (ONE A DAY) tablet Take 1 Tab by mouth daily. No Known Allergies Review of Symptoms:  
 
General - No history or complaints of unexpected fever, chills, or weight loss Head/Neck - No history or complaints of headache, diplopia, dysphagia, hearing loss Cardiac - No history or complaints of chest pain, palpitations, murmur, or shortness of breath Pulmonary - No history or complaints of shortness of breath, productive cough, hemoptysis Gastrointestinal - No history or complaints of reflux,  abdominal pain, obstipation/constipation, blood per rectum Genitourinary - No history or complaints of hematuria/dysuria, stress urinary incontinence symptoms, or renal lithiasis Musculoskeletal -Has joint pain in her knees, no muscular weakness Hematologic - No history or complaints of bleeding disorders, blood transfusions, sickle cell anemia Neurologic - No history or complaints of  migraine headaches, seizure activity, syncopal episodes, TIA or stroke Integumentary - No history or complaints of rashes, abnormal nevi, skin cancer Gynecological - No abnormal bleeding Objective:  
 
Visit Vitals /87 Pulse 64 Ht 5' 5\" (1.651 m) Wt 125.9 kg (277 lb 9.6 oz) SpO2 99% BMI 46.20 kg/m² Physical Examination: General appearance - alert, well appearing, and in no distress and oriented to person, place, and time Mental status - alert, oriented to person, place, and time Eyes - pupils equal and reactive, extraocular eye movements intact, sclera anicteric, left eye normal, right eye normal 
Ears - right ear normal, left ear normal 
Nose - normal and patent, no erythema, discharge or polyps Mouth - mucous membranes moist, pharynx normal without lesions Neck - supple, no significant adenopathy Lymphatics - no palpable lymphadenopathy, no hepatosplenomegaly Chest - clear to auscultation, no wheezes, rales or rhonchi, symmetric air entry Heart - normal rate, regular rhythm, normal S1, S2, no murmurs, rubs, clicks or gallops Abdomen - soft, nontender, nondistended, no masses or organomegaly Back exam - full range of motion, no tenderness, palpable spasm or pain on motion Neurological - alert, oriented, normal speech, no focal findings or movement disorder noted Musculoskeletal - no joint tenderness, deformity or swelling Extremities - peripheral pulses normal, no pedal edema, no clubbing or cyanosis Skin - normal coloration and turgor, no rashes, no suspicious skin lesions noted Labs / Preoperative Evaluations:  
 
No results found for this or any previous visit (from the past 1008 hour(s)). Assessment: Morbid obesity with comorbidity Plan:  
 
laparoscopic adjustable gastric band surgery This is a 63 y.o. female with a BMI of Body mass index is 46.2 kg/m². and the weight-related co-morbidties as noted above.  Saleem Villalba meets the NIH criteria for bariatric surgery based upon the BMI of Body mass index is 46.2 kg/m². and the weight-related co-morbidties. Humberto Cagle has elected laparoscopic adjustable gastric band as her intervention of choice for treatment of morbid obestiy through surgical means secondary to its safety profile, reversibility and decreases in operative risks over gastric bypass or sleeve gastrectomy. In the office today, following Marlys's history and physical examination, a 40 minute discussion regarding the anatomic alterations for the Laparoscopic Adjustable Gastric Band was undertaken. The dietary expectations and the patient and  dependent factors for success were thoroughly discussed, to include the need for frequent interval follow-up, rules of adjustable gastric band, need for periodic adjustment (4-6 in the first year) and long-term dietary changes associated with success. The possible complications of the adjustable gastric band  were also discussed, to include; death,DVT/PE (which are rare), band slip, erosion, pouch dilation, port malposition and infection. Specific weight related outcomes for success were also discussed with an emphasis on careful and close follow-up with the first year. The patient expressed an understanding of the above factors, and her questions were answered in their entirety. In addition, the patient attended a 1.5 hour power point seminar regarding obesity, surgical weight loss including, adjustable gastric band, gastric bypass, and sleeve gastrectomy. This discussion contrasted the different surgical techniques, mechanisms of actions and expected outcomes, and surgical and medical risks associated with each procedure. During this seminar, there was a long question and answer session where each questions was answered until there were no additional questions.   
 
Today, the patient had all of her questions answered and the decision was made today that the patient's preoperative evaluation is acceptable for them  to proceed with bariatric surgery  choosing adjustable gastric band as her surgical option. The patient understands the plan of action Secondary Diagnoses:  
 
DVT / Pulmonary Embolus Risk - The patient is at a higher risk for post operative DVT / pulmonary embolus secondary to their morbid obese status, relative sedentary lifestyle, and impending general anesthetic. We will plan to use anticoagulation therapy pre and post operative as well as  pneumatic compression devices and encourage ambulation once on the hospital nursing floor. The need for possible at home anticoagulation therapy has also been discussed and any decision on this matter will be made during post operative evaluations. The patient understands that their efforts at ambulation are of vital importance to reduce the risk of this complication thus placing significant burden on them as to the prevention of such issues. Signs and symptoms of DVT / PE have been discussed with the patient and they have been instructed to call the office if any these occur in the \"at home\" post op phase Hypertension - The patient has a clear understanding of how weight loss improves hypertension as a whole, but also they understand that there is a significant genetic component to this disease process.  We will monitor the patients blood pressure while in the hospital and the plan would be to continue those medications postoperatively.  If a diuretic is being used we will stop them on discharge to prevent dehydration particularly with the sleeve gastrectomy and the gastric bypass procedures.  They will be instructed to monitor their blood pressure postoperatively while at home and notify their primary care physician in the event of any significantly high or uncharacteristic readings. 
  
Weight Related Arthritis -The patient understands the benefits that weight loss surgery can have on their arthritis but also understands that weight loss is not a guaranteed cure and relief of symptoms is often dependent on the severity of the underlying disease.  The patient also understands that traditional pharmaceutical treatments for this diagnosis are usually unavailable to post-operative weight loss patients due to the effects on the gastrointestinal tract particularly with the gastric bypass and to a lesser effect with the sleeve gastrectomy.  Any changes to the patients medication treatment will ultimately be made the patients PCP with input by our office. Signed By: Jean Webb MD   
 October 26, 2020

## 2020-10-26 NOTE — PROGRESS NOTES
Lap Band over Bypass - History and Physical    Subjective: The patient is a 63 y.o. obese female with a Body mass index is 46.2 kg/m². She had a laparoscopic gastric bypass done in 2007 in [place].   she presents now to review their work up to date to see if they are a candidate for surgery and whether or not to proceed with the previously requested procedure. Bariatric comorbidities continue to include:   Patient Active Problem List   Diagnosis Code    Spondylolisthesis of lumbar region M43.16    DDD (degenerative disc disease), lumbar M51.36    Status post lumbar surgery Z98.890    Hypertension I10    Chronic pain G89.29    Arthritis M19.90    Thromboembolus ([facility] [address] I74.9    Intestinal malabsorption K90.9    Morbid obesity with BMI of 40.0-44.9, adult (HCC) E66.01, Z68.41    Morbid obesity ([facility] [address] E66.01    Arthritis of left knee M17.12    Pulmonary embolism ([facility]) I26.99    Status post gastric bypass for obesity Z98.84         They have been generally well prior to this visit and have had no recent significant illnesses. The patient has had no gastrointestinal issues that would preclude them from proceeding with the surgery they have chosen. Trip Jones has recently tried a preoperative weight loss program  in addition to seeing a bariatric nutritionist preoperatively. We have discussed on at least one other occasion about the various types of surgical weight loss procedures and they have considered these options after our initial consultation. We have once again discussed these procedures in detail and they have now decided on a surgical procedure. They present today to discuss this and confirm that their evaluation pre operatively is acceptable to continue with surgery. The patient desires laparoscopic adjustable gastric band surgery over gastric bypassfor surgical weight loss.       The patients goal weight is 175 lb. (this represents a BMI of 29)    These goals are consistent with expected outcomes of their desired operation. her Medical goals are resolution of these health issues. Since the patient's original consult 14 months ago they have been seen by their PCP for routine medical care. There has been no change to their overall medical or surgical history and they have been on no steroids in any form.     She has gained 22 lbs over the past 14 months     UGI was normal       Past Medical History:   Diagnosis Date    Arthritis     osteo-lower back    Arthritis of left knee     Chronic pain     back    Hypertension     10 years    Intestinal malabsorption     Morbid obesity ([address]     Morbid obesity with BMI of 40.0-44.9, adult ([address]     Pulmonary embolism ([facility] [address]     Status post gastric bypass for obesity     [place]    Thromboembolus [facility])      Past Surgical History:   Procedure Laterality Date    HX  SECTION      X 2    HX GASTRIC BYPASS  2005    HX KNEE REPLACEMENT Left 2019    Dr. León Holding    HX ORTHOPAEDIC  4-12    L4-L5 surgery    HX ROTATOR CUFF REPAIR Left     HX TUBAL LIGATION        Social History     Tobacco Use    Smoking status: Never Smoker    Smokeless tobacco: Never Used   Substance Use Topics    Alcohol use: Yes     Comment: month      Family History   Problem Relation Age of Onset    Diabetes Mother     Diabetes Sister     Diabetes Brother     Diabetes Sister     Diabetes Sister     Malignant Hyperthermia Neg Hx     Pseudocholinesterase Deficiency Neg Hx     Delayed Awakening Neg Hx     Post-op Nausea/Vomiting Neg Hx     Emergence Delirium Neg Hx     Post-op Cognitive Dysfunction Neg Hx     Other Neg Hx       Current Outpatient Medications   Medication Sig Dispense Refill    cyanocobalamin (VITAMIN B-12) 1,000 mcg sublingual tablet Take 1,000 mcg by mouth daily.  elderberry fruit (ELDERBERRY PO) Take  by mouth.       oxyCODONE-acetaminophen (PERCOCET) 5-325 mg per tablet Take 1 Tab by mouth every four (4) hours as needed for Pain for up to 5 days. Max Daily Amount: 6 Tabs. 30 Tab 0    enoxaparin (Lovenox) 40 mg/0.4 mL 0.4 mL by SubCUTAneous route every twelve (12) hours every twelve (12) hours. Indications: deep vein thrombosis prevention 14 Syringe 0    ergocalciferol (Vitamin D2) 1,250 mcg (50,000 unit) capsule TAKE 1 CAPSULE BY MOUTH TWICE A WEEK 8 Cap 0    cyclobenzaprine (FLEXERIL) 10 mg tablet Take  by mouth three (3) times daily as needed for Muscle Spasm(s).  diclofenac potassium (CATAFLAM) 50 mg tablet Take 50 mg by mouth three (3) times daily.  calcium carbonate (TUMS) 200 mg calcium (500 mg) chew Take 2 Tabs by mouth daily.  losartan (COZAAR) 100 mg tablet Take 100 mg by mouth daily. Indications: hypertension      amLODIPine (NORVASC) 10 mg tablet Take 10 mg by mouth daily. Indications: hypertension      multivitamin (ONE A DAY) tablet Take 1 Tab by mouth daily.          No Known Allergies         Review of Symptoms:     General - No history or complaints of unexpected fever, chills, or weight loss  Head/Neck - No history or complaints of headache, diplopia, dysphagia, hearing loss  Cardiac - No history or complaints of chest pain, palpitations, murmur, or shortness of breath  Pulmonary - No history or complaints of shortness of breath, productive cough, hemoptysis  Gastrointestinal - No history or complaints of reflux,  abdominal pain, obstipation/constipation, blood per rectum  Genitourinary - No history or complaints of hematuria/dysuria, stress urinary incontinence symptoms, or renal lithiasis  Musculoskeletal -Has joint pain in her knees, no muscular weakness  Hematologic - No history or complaints of bleeding disorders, blood transfusions, sickle cell anemia  Neurologic - No history or complaints of  migraine headaches, seizure activity, syncopal episodes, TIA or stroke  Integumentary - No history or complaints of rashes, abnormal nevi, skin cancer  Gynecological - No abnormal bleeding    Objective:     Visit Vitals  /87   Pulse 64   Ht 5' 5\" (1.651 m)   Wt 125.9 kg (277 lb 9.6 oz)   SpO2 99%   BMI 46.20 kg/m²       Physical Examination: General appearance - alert, well appearing, and in no distress and oriented to person, place, and time  Mental status - alert, oriented to person, place, and time  Eyes - pupils equal and reactive, extraocular eye movements intact, sclera anicteric, left eye normal, right eye normal  Ears - right ear normal, left ear normal  Nose - normal and patent, no erythema, discharge or polyps  Mouth - mucous membranes moist, pharynx normal without lesions  Neck - supple, no significant adenopathy  Lymphatics - no palpable lymphadenopathy, no hepatosplenomegaly  Chest - clear to auscultation, no wheezes, rales or rhonchi, symmetric air entry  Heart - normal rate, regular rhythm, normal S1, S2, no murmurs, rubs, clicks or gallops  Abdomen - soft, nontender, nondistended, no masses or organomegaly  Back exam - full range of motion, no tenderness, palpable spasm or pain on motion  Neurological - alert, oriented, normal speech, no focal findings or movement disorder noted  Musculoskeletal - no joint tenderness, deformity or swelling  Extremities - peripheral pulses normal, no pedal edema, no clubbing or cyanosis  Skin - normal coloration and turgor, no rashes, no suspicious skin lesions noted    Labs / Preoperative Evaluations:     No results found for this or any previous visit (from the past 1008 hour(s)). Assessment:     Morbid obesity with comorbidity    Plan:     laparoscopic adjustable gastric band surgery    This is a 63 y.o. female with a BMI of Body mass index is 46.2 kg/m². and the weight-related co-morbidties as noted above. Brea Chaney meets the NIH criteria for bariatric surgery based upon the BMI of Body mass index is 46.2 kg/m². and the weight-related co-morbidties.  Brea Chaney has elected laparoscopic adjustable gastric band as her intervention of choice for treatment of morbid obestiy through surgical means secondary to its safety profile, reversibility and decreases in operative risks over gastric bypass or sleeve gastrectomy. In the office today, following Marlys's history and physical examination, a 40 minute discussion regarding the anatomic alterations for the Laparoscopic Adjustable Gastric Band was undertaken. The dietary expectations and the patient and  dependent factors for success were thoroughly discussed, to include the need for frequent interval follow-up, rules of adjustable gastric band, need for periodic adjustment (4-6 in the first year) and long-term dietary changes associated with success. The possible complications of the adjustable gastric band  were also discussed, to include; death,DVT/PE (which are rare), band slip, erosion, pouch dilation, port malposition and infection. Specific weight related outcomes for success were also discussed with an emphasis on careful and close follow-up with the first year. The patient expressed an understanding of the above factors, and her questions were answered in their entirety. In addition, the patient attended a 1.5 hour power point seminar regarding obesity, surgical weight loss including, adjustable gastric band, gastric bypass, and sleeve gastrectomy. This discussion contrasted the different surgical techniques, mechanisms of actions and expected outcomes, and surgical and medical risks associated with each procedure. During this seminar, there was a long question and answer session where each questions was answered until there were no additional questions. Today, the patient had all of her questions answered and the decision was made today that the patient's preoperative evaluation is acceptable for them  to proceed with bariatric surgery  choosing adjustable gastric band as her surgical option.     The patient understands the plan of action        Secondary Diagnoses:     DVT / Pulmonary Embolus Risk - The patient is at a higher risk for post operative DVT / pulmonary embolus secondary to their morbid obese status, relative sedentary lifestyle, and impending general anesthetic. We will plan to use anticoagulation therapy pre and post operative as well as  pneumatic compression devices and encourage ambulation once on the hospital nursing floor. The need for possible at home anticoagulation therapy has also been discussed and any decision on this matter will be made during post operative evaluations. The patient understands that their efforts at ambulation are of vital importance to reduce the risk of this complication thus placing significant burden on them as to the prevention of such issues. Signs and symptoms of DVT / PE have been discussed with the patient and they have been instructed to call the office if any these occur in the \"at home\" post op phase    Hypertension - The patient has a clear understanding of how weight loss improves hypertension as a whole, but also they understand that there is a significant genetic component to this disease process.  We will monitor the patients blood pressure while in the hospital and the plan would be to continue those medications postoperatively.  If a diuretic is being used we will stop them on discharge to prevent dehydration particularly with the sleeve gastrectomy and the gastric bypass procedures.  They will be instructed to monitor their blood pressure postoperatively while at home and notify their primary care physician in the event of any significantly high or uncharacteristic readings.     Weight Related Arthritis -The patient understands the benefits that weight loss surgery can have on their arthritis but also understands that weight loss is not a guaranteed cure and relief of symptoms is often dependent on the severity of the underlying disease.  The patient also understands that traditional pharmaceutical treatments for this diagnosis are usually unavailable to post-operative weight loss patients due to the effects on the gastrointestinal tract particularly with the gastric bypass and to a lesser effect with the sleeve gastrectomy.  Any changes to the patients medication treatment will ultimately be made the patients PCP with input by our office.     Signed By: Justine Patino MD     October 26, 2020

## 2020-11-04 ENCOUNTER — HOSPITAL ENCOUNTER (OUTPATIENT)
Dept: PREADMISSION TESTING | Age: 65
Discharge: HOME | End: 2020-11-04
Payer: MEDICARE

## 2020-11-04 LAB
ALBUMIN SERPL-MCNC: 3.7 G/DL (ref 3.4–5)
ALBUMIN/GLOB SERPL: 1 {RATIO} (ref 0.8–1.7)
ALP SERPL-CCNC: 115 U/L (ref 45–117)
ALT SERPL-CCNC: 31 U/L (ref 13–56)
ANION GAP SERPL CALC-SCNC: 2 MMOL/L (ref 3–18)
AST SERPL-CCNC: 24 U/L (ref 10–38)
ATRIAL RATE: 69 BPM
BASOPHILS # BLD: 0 K/UL (ref 0–0.1)
BASOPHILS NFR BLD: 1 % (ref 0–2)
BILIRUB SERPL-MCNC: 0.3 MG/DL (ref 0.2–1)
BUN SERPL-MCNC: 18 MG/DL (ref 7–18)
BUN/CREAT SERPL: 24 (ref 12–20)
CALCIUM SERPL-MCNC: 9.7 MG/DL (ref 8.5–10.1)
CALCULATED P AXIS, ECG09: 60 DEGREES
CALCULATED R AXIS, ECG10: 19 DEGREES
CALCULATED T AXIS, ECG11: 100 DEGREES
CHLORIDE SERPL-SCNC: 103 MMOL/L (ref 100–111)
CO2 SERPL-SCNC: 33 MMOL/L (ref 21–32)
CREAT SERPL-MCNC: 0.76 MG/DL (ref 0.6–1.3)
DIAGNOSIS, 93000: NORMAL
DIFFERENTIAL METHOD BLD: NORMAL
EOSINOPHIL # BLD: 0.3 K/UL (ref 0–0.4)
EOSINOPHIL NFR BLD: 3 % (ref 0–5)
ERYTHROCYTE [DISTWIDTH] IN BLOOD BY AUTOMATED COUNT: 14.2 % (ref 11.6–14.5)
GLOBULIN SER CALC-MCNC: 3.6 G/DL (ref 2–4)
GLUCOSE SERPL-MCNC: 98 MG/DL (ref 74–99)
HCG SERPL QL: NEGATIVE
HCT VFR BLD AUTO: 42.5 % (ref 35–45)
HGB BLD-MCNC: 13.8 G/DL (ref 12–16)
LYMPHOCYTES # BLD: 2.6 K/UL (ref 0.9–3.6)
LYMPHOCYTES NFR BLD: 32 % (ref 21–52)
MCH RBC QN AUTO: 27.8 PG (ref 24–34)
MCHC RBC AUTO-ENTMCNC: 32.5 G/DL (ref 31–37)
MCV RBC AUTO: 85.5 FL (ref 74–97)
MONOCYTES # BLD: 0.7 K/UL (ref 0.05–1.2)
MONOCYTES NFR BLD: 8 % (ref 3–10)
NEUTS SEG # BLD: 4.4 K/UL (ref 1.8–8)
NEUTS SEG NFR BLD: 56 % (ref 40–73)
P-R INTERVAL, ECG05: 162 MS
PLATELET # BLD AUTO: 269 K/UL (ref 135–420)
PMV BLD AUTO: 10.7 FL (ref 9.2–11.8)
POTASSIUM SERPL-SCNC: 4.1 MMOL/L (ref 3.5–5.5)
PROT SERPL-MCNC: 7.3 G/DL (ref 6.4–8.2)
Q-T INTERVAL, ECG07: 428 MS
QRS DURATION, ECG06: 84 MS
QTC CALCULATION (BEZET), ECG08: 458 MS
RBC # BLD AUTO: 4.97 M/UL (ref 4.2–5.3)
SODIUM SERPL-SCNC: 138 MMOL/L (ref 136–145)
VENTRICULAR RATE, ECG03: 69 BPM
WBC # BLD AUTO: 8 K/UL (ref 4.6–13.2)

## 2020-11-04 PROCEDURE — 85025 COMPLETE CBC W/AUTO DIFF WBC: CPT

## 2020-11-04 PROCEDURE — 93005 ELECTROCARDIOGRAM TRACING: CPT

## 2020-11-04 PROCEDURE — 36415 COLL VENOUS BLD VENIPUNCTURE: CPT

## 2020-11-04 PROCEDURE — 87635 SARS-COV-2 COVID-19 AMP PRB: CPT

## 2020-11-04 PROCEDURE — 84703 CHORIONIC GONADOTROPIN ASSAY: CPT

## 2020-11-04 PROCEDURE — 80053 COMPREHEN METABOLIC PANEL: CPT

## 2020-11-05 ENCOUNTER — TELEPHONE (OUTPATIENT)
Dept: SURGERY | Age: 65
End: 2020-11-05

## 2020-11-05 LAB — SARS-COV-2, COV2NT: NOT DETECTED

## 2020-11-05 RX ORDER — ENOXAPARIN SODIUM 100 MG/ML
40 INJECTION SUBCUTANEOUS EVERY 12 HOURS
Qty: 28 SYRINGE | Refills: 0 | Status: SHIPPED | OUTPATIENT
Start: 2020-11-05 | End: 2020-11-19

## 2020-11-09 ENCOUNTER — ANESTHESIA EVENT (OUTPATIENT)
Dept: SURGERY | Age: 65
End: 2020-11-09
Payer: MEDICARE

## 2020-11-09 ENCOUNTER — TELEPHONE (OUTPATIENT)
Dept: SURGERY | Age: 65
End: 2020-11-09

## 2020-11-09 NOTE — TELEPHONE ENCOUNTER
RN spoke with patient pre-operatively to review pre-op education. Patient received emailed education to include the following: bariatric book, liquid diet slides, medical presentation, and nutritional presentation. Patient was able to view. Questions were answered in their entirety. Patient was reminded of a clear liquid diet the day before surgery and choices were reviewed, as well as nothing to eat and/or drink after midnight the day before, with the exception of a little bit of water with any medications that may need to be taken the morning of surgery. Patient verbalized understanding. Patient was given a bariatric book during pre-op appointment; electronic book sent, as well. Lovenox prescription: sent electronically  Percocet prescription: Dr. Barbara Francis will send electronically day of discharge  Prilosec: Patient informed to purchase a two month supply. Children's complete chewable multi-vitamin: Patient informed to purchase. Probiotic: Patient informed to purchase at least a 1 billion strain. Patient was encouraged to contact the office with further questions.

## 2020-11-10 ENCOUNTER — HOSPITAL ENCOUNTER (OUTPATIENT)
Age: 65
Setting detail: OBSERVATION
Discharge: HOME | End: 2020-11-11
Attending: SPECIALIST | Admitting: SPECIALIST
Payer: MEDICARE

## 2020-11-10 ENCOUNTER — ANESTHESIA (OUTPATIENT)
Dept: SURGERY | Age: 65
End: 2020-11-10
Payer: MEDICARE

## 2020-11-10 DIAGNOSIS — G89.18 POST-OP PAIN: Primary | ICD-10-CM

## 2020-11-10 PROBLEM — Z98.84 H/O LAPAROSCOPIC ADJUSTABLE GASTRIC BANDING: Status: ACTIVE | Noted: 2020-11-10

## 2020-11-10 PROCEDURE — 88307 TISSUE EXAM BY PATHOLOGIST: CPT

## 2020-11-10 PROCEDURE — 77010033678 HC OXYGEN DAILY

## 2020-11-10 PROCEDURE — 77030037283 HC BND LAPSCP GAST ADJ RPDPRT APOL -I2: Performed by: SPECIALIST

## 2020-11-10 PROCEDURE — 77030010030: Performed by: SPECIALIST

## 2020-11-10 PROCEDURE — 77030002912 HC SUT ETHBND J&J -A: Performed by: SPECIALIST

## 2020-11-10 PROCEDURE — 74011250636 HC RX REV CODE- 250/636: Performed by: NURSE ANESTHETIST, CERTIFIED REGISTERED

## 2020-11-10 PROCEDURE — 99218 HC RM OBSERVATION: CPT

## 2020-11-10 PROCEDURE — 88313 SPECIAL STAINS GROUP 2: CPT

## 2020-11-10 PROCEDURE — 77030008603 HC TRCR ENDOSC EPATH J&J -C: Performed by: SPECIALIST

## 2020-11-10 PROCEDURE — 77030006643: Performed by: STUDENT IN AN ORGANIZED HEALTH CARE EDUCATION/TRAINING PROGRAM

## 2020-11-10 PROCEDURE — 77030008477 HC STYL SATN SLP COVD -A: Performed by: STUDENT IN AN ORGANIZED HEALTH CARE EDUCATION/TRAINING PROGRAM

## 2020-11-10 PROCEDURE — 74011000250 HC RX REV CODE- 250: Performed by: NURSE ANESTHETIST, CERTIFIED REGISTERED

## 2020-11-10 PROCEDURE — 76210000006 HC OR PH I REC 0.5 TO 1 HR: Performed by: SPECIALIST

## 2020-11-10 PROCEDURE — 77030008683 HC TU ET CUF COVD -A: Performed by: STUDENT IN AN ORGANIZED HEALTH CARE EDUCATION/TRAINING PROGRAM

## 2020-11-10 PROCEDURE — 74011000250 HC RX REV CODE- 250: Performed by: SPECIALIST

## 2020-11-10 PROCEDURE — 77030005537 HC CATH URETH BARD -A: Performed by: SPECIALIST

## 2020-11-10 PROCEDURE — 76010000149 HC OR TIME 1 TO 1.5 HR: Performed by: SPECIALIST

## 2020-11-10 PROCEDURE — 77030037281 HC APPL EZ PRT LAP BND RPDPRT APOL -C: Performed by: SPECIALIST

## 2020-11-10 PROCEDURE — 77030008602 HC TRCR ENDOSC EPATH J&J -B: Performed by: SPECIALIST

## 2020-11-10 PROCEDURE — 74011250636 HC RX REV CODE- 250/636: Performed by: SPECIALIST

## 2020-11-10 PROCEDURE — 77030005264 HC CATH JEJU BKR BARD -D: Performed by: SPECIALIST

## 2020-11-10 PROCEDURE — 77030020782 HC GWN BAIR PAWS FLX 3M -B: Performed by: SPECIALIST

## 2020-11-10 PROCEDURE — 2709999900 HC NON-CHARGEABLE SUPPLY: Performed by: SPECIALIST

## 2020-11-10 PROCEDURE — 74011250637 HC RX REV CODE- 250/637: Performed by: ANESTHESIOLOGY

## 2020-11-10 PROCEDURE — 74011250637 HC RX REV CODE- 250/637: Performed by: SPECIALIST

## 2020-11-10 PROCEDURE — 77030027138 HC INCENT SPIROMETER -A: Performed by: SPECIALIST

## 2020-11-10 PROCEDURE — 77030040361 HC SLV COMPR DVT MDII -B: Performed by: SPECIALIST

## 2020-11-10 PROCEDURE — 77030002933 HC SUT MCRYL J&J -A: Performed by: SPECIALIST

## 2020-11-10 PROCEDURE — 77030003578 HC NDL INSUF VERES AMR -B: Performed by: SPECIALIST

## 2020-11-10 PROCEDURE — 76060000033 HC ANESTHESIA 1 TO 1.5 HR: Performed by: SPECIALIST

## 2020-11-10 DEVICE — THE LAP-BAND AP ADJUSTABLE GASTRIC BANDING SYSTEM WITH RAPIDPORT EZ
Type: IMPLANTABLE DEVICE | Site: STOMACH | Status: FUNCTIONAL
Brand: LAP-BAND

## 2020-11-10 RX ORDER — OXYCODONE AND ACETAMINOPHEN 5; 325 MG/1; MG/1
1 TABLET ORAL
Qty: 24 TAB | Refills: 0 | Status: SHIPPED | OUTPATIENT
Start: 2020-11-10 | End: 2020-11-14

## 2020-11-10 RX ORDER — PROPOFOL 10 MG/ML
INJECTION, EMULSION INTRAVENOUS AS NEEDED
Status: DISCONTINUED | OUTPATIENT
Start: 2020-11-10 | End: 2020-11-10 | Stop reason: HOSPADM

## 2020-11-10 RX ORDER — EPHEDRINE SULFATE/0.9% NACL/PF 50 MG/5 ML
SYRINGE (ML) INTRAVENOUS AS NEEDED
Status: DISCONTINUED | OUTPATIENT
Start: 2020-11-10 | End: 2020-11-10 | Stop reason: HOSPADM

## 2020-11-10 RX ORDER — SODIUM CHLORIDE, SODIUM LACTATE, POTASSIUM CHLORIDE, CALCIUM CHLORIDE 600; 310; 30; 20 MG/100ML; MG/100ML; MG/100ML; MG/100ML
50 INJECTION, SOLUTION INTRAVENOUS CONTINUOUS
Status: CANCELLED | OUTPATIENT
Start: 2020-11-10

## 2020-11-10 RX ORDER — ONDANSETRON 2 MG/ML
4 INJECTION INTRAMUSCULAR; INTRAVENOUS
Status: DISCONTINUED | OUTPATIENT
Start: 2020-11-10 | End: 2020-11-11 | Stop reason: HOSPADM

## 2020-11-10 RX ORDER — LIDOCAINE HYDROCHLORIDE 20 MG/ML
INJECTION, SOLUTION EPIDURAL; INFILTRATION; INTRACAUDAL; PERINEURAL AS NEEDED
Status: DISCONTINUED | OUTPATIENT
Start: 2020-11-10 | End: 2020-11-10 | Stop reason: HOSPADM

## 2020-11-10 RX ORDER — DEXMEDETOMIDINE HYDROCHLORIDE 100 UG/ML
INJECTION, SOLUTION INTRAVENOUS AS NEEDED
Status: DISCONTINUED | OUTPATIENT
Start: 2020-11-10 | End: 2020-11-10 | Stop reason: HOSPADM

## 2020-11-10 RX ORDER — SODIUM CHLORIDE, SODIUM LACTATE, POTASSIUM CHLORIDE, CALCIUM CHLORIDE 600; 310; 30; 20 MG/100ML; MG/100ML; MG/100ML; MG/100ML
50 INJECTION, SOLUTION INTRAVENOUS CONTINUOUS
Status: DISCONTINUED | OUTPATIENT
Start: 2020-11-10 | End: 2020-11-11 | Stop reason: HOSPADM

## 2020-11-10 RX ORDER — MIDAZOLAM HYDROCHLORIDE 1 MG/ML
INJECTION, SOLUTION INTRAMUSCULAR; INTRAVENOUS AS NEEDED
Status: DISCONTINUED | OUTPATIENT
Start: 2020-11-10 | End: 2020-11-10 | Stop reason: HOSPADM

## 2020-11-10 RX ORDER — OXYCODONE AND ACETAMINOPHEN 5; 325 MG/1; MG/1
1 TABLET ORAL
Status: DISCONTINUED | OUTPATIENT
Start: 2020-11-10 | End: 2020-11-11 | Stop reason: HOSPADM

## 2020-11-10 RX ORDER — GLYCOPYRROLATE 0.2 MG/ML
INJECTION INTRAMUSCULAR; INTRAVENOUS AS NEEDED
Status: DISCONTINUED | OUTPATIENT
Start: 2020-11-10 | End: 2020-11-10 | Stop reason: HOSPADM

## 2020-11-10 RX ORDER — NALBUPHINE HYDROCHLORIDE 10 MG/ML
5 INJECTION, SOLUTION INTRAMUSCULAR; INTRAVENOUS; SUBCUTANEOUS
Status: CANCELLED | OUTPATIENT
Start: 2020-11-10

## 2020-11-10 RX ORDER — ENOXAPARIN SODIUM 100 MG/ML
40 INJECTION SUBCUTANEOUS ONCE
Status: COMPLETED | OUTPATIENT
Start: 2020-11-10 | End: 2020-11-10

## 2020-11-10 RX ORDER — HYDROMORPHONE HYDROCHLORIDE 2 MG/ML
0.5 INJECTION, SOLUTION INTRAMUSCULAR; INTRAVENOUS; SUBCUTANEOUS AS NEEDED
Status: CANCELLED | OUTPATIENT
Start: 2020-11-10

## 2020-11-10 RX ORDER — NYSTATIN 100000 [USP'U]/ML
500000 SUSPENSION ORAL
Status: COMPLETED | OUTPATIENT
Start: 2020-11-10 | End: 2020-11-10

## 2020-11-10 RX ORDER — KETAMINE HCL IN 0.9 % NACL 50 MG/5 ML
SYRINGE (ML) INTRAVENOUS AS NEEDED
Status: DISCONTINUED | OUTPATIENT
Start: 2020-11-10 | End: 2020-11-10 | Stop reason: HOSPADM

## 2020-11-10 RX ORDER — ROCURONIUM BROMIDE 10 MG/ML
INJECTION, SOLUTION INTRAVENOUS AS NEEDED
Status: DISCONTINUED | OUTPATIENT
Start: 2020-11-10 | End: 2020-11-10 | Stop reason: HOSPADM

## 2020-11-10 RX ORDER — FENTANYL CITRATE 50 UG/ML
50 INJECTION, SOLUTION INTRAMUSCULAR; INTRAVENOUS
Status: CANCELLED | OUTPATIENT
Start: 2020-11-10

## 2020-11-10 RX ORDER — SUCCINYLCHOLINE CHLORIDE 100 MG/5ML
SYRINGE (ML) INTRAVENOUS AS NEEDED
Status: DISCONTINUED | OUTPATIENT
Start: 2020-11-10 | End: 2020-11-10 | Stop reason: HOSPADM

## 2020-11-10 RX ORDER — NALOXONE HYDROCHLORIDE 0.4 MG/ML
0.04 INJECTION, SOLUTION INTRAMUSCULAR; INTRAVENOUS; SUBCUTANEOUS
Status: CANCELLED | OUTPATIENT
Start: 2020-11-10

## 2020-11-10 RX ORDER — BUPIVACAINE HYDROCHLORIDE AND EPINEPHRINE 2.5; 5 MG/ML; UG/ML
INJECTION, SOLUTION EPIDURAL; INFILTRATION; INTRACAUDAL; PERINEURAL AS NEEDED
Status: DISCONTINUED | OUTPATIENT
Start: 2020-11-10 | End: 2020-11-10 | Stop reason: HOSPADM

## 2020-11-10 RX ORDER — FAMOTIDINE 20 MG/50ML
20 INJECTION, SOLUTION INTRAVENOUS ONCE
Status: COMPLETED | OUTPATIENT
Start: 2020-11-10 | End: 2020-11-10

## 2020-11-10 RX ORDER — DIPHENHYDRAMINE HYDROCHLORIDE 50 MG/ML
12.5 INJECTION, SOLUTION INTRAMUSCULAR; INTRAVENOUS
Status: CANCELLED | OUTPATIENT
Start: 2020-11-10

## 2020-11-10 RX ORDER — ACETAMINOPHEN 500 MG
1000 TABLET ORAL ONCE
Status: COMPLETED | OUTPATIENT
Start: 2020-11-10 | End: 2020-11-10

## 2020-11-10 RX ORDER — ONDANSETRON 2 MG/ML
INJECTION INTRAMUSCULAR; INTRAVENOUS AS NEEDED
Status: DISCONTINUED | OUTPATIENT
Start: 2020-11-10 | End: 2020-11-10 | Stop reason: HOSPADM

## 2020-11-10 RX ORDER — SODIUM CHLORIDE 0.9 % (FLUSH) 0.9 %
5-40 SYRINGE (ML) INJECTION EVERY 8 HOURS
Status: CANCELLED | OUTPATIENT
Start: 2020-11-10

## 2020-11-10 RX ORDER — HYDROMORPHONE HYDROCHLORIDE 2 MG/ML
INJECTION, SOLUTION INTRAMUSCULAR; INTRAVENOUS; SUBCUTANEOUS AS NEEDED
Status: DISCONTINUED | OUTPATIENT
Start: 2020-11-10 | End: 2020-11-10 | Stop reason: HOSPADM

## 2020-11-10 RX ORDER — OXYCODONE HYDROCHLORIDE 5 MG/1
5 TABLET ORAL
Status: COMPLETED | OUTPATIENT
Start: 2020-11-10 | End: 2020-11-10

## 2020-11-10 RX ORDER — FENTANYL CITRATE 50 UG/ML
INJECTION, SOLUTION INTRAMUSCULAR; INTRAVENOUS AS NEEDED
Status: DISCONTINUED | OUTPATIENT
Start: 2020-11-10 | End: 2020-11-10 | Stop reason: HOSPADM

## 2020-11-10 RX ORDER — SODIUM CHLORIDE, SODIUM LACTATE, POTASSIUM CHLORIDE, CALCIUM CHLORIDE 600; 310; 30; 20 MG/100ML; MG/100ML; MG/100ML; MG/100ML
125 INJECTION, SOLUTION INTRAVENOUS CONTINUOUS
Status: DISCONTINUED | OUTPATIENT
Start: 2020-11-10 | End: 2020-11-11 | Stop reason: HOSPADM

## 2020-11-10 RX ORDER — SODIUM CHLORIDE 0.9 % (FLUSH) 0.9 %
5-40 SYRINGE (ML) INJECTION AS NEEDED
Status: CANCELLED | OUTPATIENT
Start: 2020-11-10

## 2020-11-10 RX ADMIN — Medication 180 MG: at 12:59

## 2020-11-10 RX ADMIN — PROPOFOL 200 MG: 10 INJECTION, EMULSION INTRAVENOUS at 12:59

## 2020-11-10 RX ADMIN — CEFAZOLIN SODIUM 3 G: 10 INJECTION, POWDER, FOR SOLUTION INTRAVENOUS at 13:04

## 2020-11-10 RX ADMIN — OXYCODONE 5 MG: 5 TABLET ORAL at 17:28

## 2020-11-10 RX ADMIN — HYDROMORPHONE HYDROCHLORIDE 1 MG: 2 INJECTION, SOLUTION INTRAMUSCULAR; INTRAVENOUS; SUBCUTANEOUS at 13:08

## 2020-11-10 RX ADMIN — GLYCOPYRROLATE 0.2 MG: 0.2 INJECTION INTRAMUSCULAR; INTRAVENOUS at 13:59

## 2020-11-10 RX ADMIN — SODIUM CHLORIDE, SODIUM LACTATE, POTASSIUM CHLORIDE, AND CALCIUM CHLORIDE: 600; 310; 30; 20 INJECTION, SOLUTION INTRAVENOUS at 13:14

## 2020-11-10 RX ADMIN — SUGAMMADEX 500 MG: 100 INJECTION, SOLUTION INTRAVENOUS at 14:00

## 2020-11-10 RX ADMIN — ONDANSETRON 4 MG: 2 INJECTION INTRAMUSCULAR; INTRAVENOUS at 22:20

## 2020-11-10 RX ADMIN — DEXMEDETOMIDINE HYDROCHLORIDE 10 MCG: 100 INJECTION, SOLUTION INTRAVENOUS at 13:41

## 2020-11-10 RX ADMIN — ROCURONIUM BROMIDE 10 MG: 10 INJECTION, SOLUTION INTRAVENOUS at 13:39

## 2020-11-10 RX ADMIN — LIDOCAINE HYDROCHLORIDE 100 MG: 20 INJECTION, SOLUTION EPIDURAL; INFILTRATION; INTRACAUDAL; PERINEURAL at 12:59

## 2020-11-10 RX ADMIN — OXYCODONE HYDROCHLORIDE AND ACETAMINOPHEN 1 TABLET: 5; 325 TABLET ORAL at 22:20

## 2020-11-10 RX ADMIN — ROCURONIUM BROMIDE 35 MG: 10 INJECTION, SOLUTION INTRAVENOUS at 13:06

## 2020-11-10 RX ADMIN — NYSTATIN 500000 UNITS: 100000 SUSPENSION ORAL at 11:41

## 2020-11-10 RX ADMIN — ACETAMINOPHEN 1000 MG: 500 TABLET ORAL at 11:41

## 2020-11-10 RX ADMIN — ROCURONIUM BROMIDE 5 MG: 10 INJECTION, SOLUTION INTRAVENOUS at 12:58

## 2020-11-10 RX ADMIN — Medication 30 MG: at 13:49

## 2020-11-10 RX ADMIN — FENTANYL CITRATE 100 MCG: 50 INJECTION, SOLUTION INTRAMUSCULAR; INTRAVENOUS at 12:57

## 2020-11-10 RX ADMIN — Medication 10 MG: at 13:26

## 2020-11-10 RX ADMIN — ONDANSETRON HYDROCHLORIDE 4 MG: 2 INJECTION INTRAMUSCULAR; INTRAVENOUS at 13:52

## 2020-11-10 RX ADMIN — DEXMEDETOMIDINE HYDROCHLORIDE 10 MCG: 100 INJECTION, SOLUTION INTRAVENOUS at 13:47

## 2020-11-10 RX ADMIN — ENOXAPARIN SODIUM 40 MG: 40 INJECTION SUBCUTANEOUS at 11:41

## 2020-11-10 RX ADMIN — SODIUM CHLORIDE, SODIUM LACTATE, POTASSIUM CHLORIDE, AND CALCIUM CHLORIDE 125 ML/HR: 600; 310; 30; 20 INJECTION, SOLUTION INTRAVENOUS at 11:45

## 2020-11-10 RX ADMIN — FAMOTIDINE 20 MG: 20 INJECTION, SOLUTION INTRAVENOUS at 11:45

## 2020-11-10 RX ADMIN — MIDAZOLAM 2 MG: 1 INJECTION INTRAMUSCULAR; INTRAVENOUS at 12:53

## 2020-11-10 NOTE — PERIOP NOTES
TRANSFER - IN REPORT:    Verbal report received from ORN & CRNA on Evin Carter  being received from OR (unit) for routine post - op      Report consisted of patients Situation, Background, Assessment and   Recommendations(SBAR). Information from the following report(s) SBAR was reviewed with the receiving nurse. Opportunity for questions and clarification was provided. Assessment completed upon patients arrival to unit and care assumed. Upon arrival to pacu IV became dislodged, new IV placed to right hand.

## 2020-11-10 NOTE — PERIOP NOTES
Reviewed PTA medication list with patient/caregiver and patient/caregiver denies any additional medications. Patient admits to having a responsible adult care for them at home for at least 24 hours after surgery. Patient encouraged to use gown warming system and informed that using said warming gown to regulate body temperature prior to a procedure has been shown to help reduce the risks of blood clots and infection. Patient's pharmacy of choice verified and documented in PTA medication section. Dual skin assessment & fall risk band verification completed with  Ana Kruger RN.

## 2020-11-10 NOTE — ANESTHESIA PREPROCEDURE EVALUATION
Relevant Problems   No relevant active problems       Anesthetic History   No history of anesthetic complications     Pertinent negatives: No PONV       Review of Systems / Medical History  Patient summary reviewed, nursing notes reviewed and pertinent labs reviewed    Pulmonary  Within defined limits            Pertinent negatives: No COPD, asthma and sleep apnea     Neuro/Psych   Within defined limits        Pertinent negatives: No seizures and CVA   Cardiovascular    Hypertension          Hyperlipidemia (not compliant with meds)  Pertinent negatives: No past MI and CHF  Exercise tolerance: >4 METS     GI/Hepatic/Renal  Within defined limits           Pertinent negatives: No liver disease and renal disease   Endo/Other        Morbid obesity and arthritis  Pertinent negatives: No diabetes   Other Findings              Physical Exam    Airway  Mallampati: II  TM Distance: 4 - 6 cm  Neck ROM: normal range of motion   Mouth opening: Normal     Cardiovascular    Rhythm: regular  Rate: normal         Dental  No notable dental hx       Pulmonary  Breath sounds clear to auscultation               Abdominal  GI exam deferred       Other Findings            Anesthetic Plan    ASA: 3  Anesthesia type: general          Induction: Intravenous  Anesthetic plan and risks discussed with: Patient

## 2020-11-10 NOTE — DISCHARGE INSTRUCTIONS
Patient Education        9 Things To Do If You've Been Exposed to COVID-19    Stay home. If you've been exposed, you should stay in isolation for 14 days. Don't go to school, work, or public areas. And don't use public transportation, ride-shares, or taxis unless you have no choice. Leave your home only if you need to get medical care. But call the doctor's office first so they know you're coming, and wear a cloth face cover when you go. Call your doctor. Call your doctor or other health professional to let them know that you've been exposed. They might want you to be tested, or they may have other instructions for you. If you become sick, wear a face cover when you are around other people. It can help stop the spread of the virus when you cough or sneeze. Limit contact with people in your home. If possible, stay in a separate bedroom and use a separate bathroom. Avoid contact with pets and other animals. Cover your mouth and nose with a tissue when you cough or sneeze. Then throw it in the trash right away. Wash your hands often, especially after you cough or sneeze. Use soap and water, and scrub for at least 20 seconds. If soap and water aren't available, use an alcohol-based hand . Don't share personal household items. These include bedding, towels, cups and glasses, and eating utensils. Clean and disinfect your home every day. Use household  or disinfectant wipes or sprays. Take special care to clean things that you grab with your hands. These include doorknobs, remote controls, phones, and handles on your refrigerator and microwave. And don't forget countertops, tabletops, bathrooms, and computer keyboards. Current as of: July 10, 2020               Content Version: 12.6  © 2006-2020 [employer]. Care instructions adapted under license by [employer] (which disclaims liability or warranty for this information).  If you have questions about a medical condition or this instruction, always ask your healthcare professional. [address] any warranty or liability for your use of this information. DISCHARGE SUMMARY from Nurse    PATIENT INSTRUCTIONS:    After general anesthesia or intravenous sedation, for 24 hours or while taking prescription Narcotics:  · Limit your activities  · Do not drive and operate hazardous machinery  · Do not make important personal or business decisions  · Do  not drink alcoholic beverages  · If you have not urinated within 8 hours after discharge, please contact your surgeon on call. Report the following to your surgeon:  · Excessive pain, swelling, redness or odor of or around the surgical area  · Temperature over 100.5  · Nausea and vomiting lasting longer than 4 hours or if unable to take medications  · Any signs of decreased circulation or nerve impairment to extremity: change in color, persistent  numbness, tingling, coldness or increase pain  · Any questions    What to do at Home:  LIQUID DIET AS INSTRUCTED  RETURN TO OFFICE AS SCHEDULED    If you experience any of the following symptoms heavy bleeding, fevers, severe pain, please follow up with dr Lang Brothers    *  Please give a list of your current medications to your Primary Care Provider. *  Please update this list whenever your medications are discontinued, doses are      changed, or new medications (including over-the-counter products) are added. *  Please carry medication information at all times in case of emergency situations. These are general instructions for a healthy lifestyle:    No smoking/ No tobacco products/ Avoid exposure to second hand smoke  Surgeon General's Warning:  Quitting smoking now greatly reduces serious risk to your health.     Obesity, smoking, and sedentary lifestyle greatly increases your risk for illness    A healthy diet, regular physical exercise & weight monitoring are important for maintaining a healthy lifestyle    You may be retaining fluid if you have a history of heart failure or if you experience any of the following symptoms:  Weight gain of 3 pounds or more overnight or 5 pounds in a week, increased swelling in our hands or feet or shortness of breath while lying flat in bed. Please call your doctor as soon as you notice any of these symptoms; do not wait until your next office visit. The discharge information has been reviewed with the patient and caregiver. The patient and caregiver verbalized understanding. Discharge medications reviewed with the patient and caregiver and appropriate educational materials and side effects teaching were provided.   ___________________________________________________________________________________________________________________________________    Patient armband removed and shredded

## 2020-11-10 NOTE — PERIOP NOTES
Dr Payton Lima aware of patient low sats on room, air, patient not able to ambulate without maximum assistance while complaining  of persistent dizziness. Order given to admit patient for 24 hours observation on medical floor.

## 2020-11-10 NOTE — INTERVAL H&P NOTE
Update History & Physical    The Patient's History and Physical of October 26, 2020 was reviewed with the patient and I examined the patient. There was no change. The surgical site was confirmed by the patient and me. Plan:  The risk, benefits, expected outcome, and alternative to the recommended procedure have been discussed with the patient. Patient understands and wants to proceed with the procedure. The patient was counseled at length about the risks of wong Covid-19 during their perioperative period and any recovery window from their procedure. The patient was made aware that wong Covid-19  may worsen their prognosis for recovering from their procedure and lend to a higher morbidity and/or mortality risk. All material risks, benefits, and reasonable alternatives including postponing the procedure were discussed. The patient does  wish to proceed with the procedure at this time.       Electronically signed by Payton Ramirez MD on 11/10/2020 at 12:03 PM

## 2020-11-10 NOTE — PERIOP NOTES
TRANSFER - OUT REPORT:    Verbal report given to [address] (name) on Su Camarena  being transferred to [facility](unit) for routine post - op       Report consisted of patients Situation, Background, Assessment and   Recommendations(SBAR). Information from the following report(s) SBAR, Kardex, OR Summary, MAR and Cardiac Rhythm NSR was reviewed with the receiving nurse. Lines:   Peripheral IV 11/10/20 Anterior;Right Hand (Active)        Opportunity for questions and clarification was provided.       Patient transported with:   O2 @ 1 liters  Registered Nurse  [facility]

## 2020-11-10 NOTE — BRIEF OP NOTE
Brief Postoperative Note    Patient: Michelle Baeza  YOB: 1955  MRN: [id number]    Date of Procedure: 11/10/2020     Pre-Op Diagnosis: HYPERTENSION,MORBID OBESITY,BMI42,POST BARIATRIC SURGERY    Post-Op Diagnosis: Same as preoperative diagnosis. Procedure(s):  LAPAROSCOPIC GASTRIC ADJUSTABLE BAND, LYSIS OF ADHESIONS FOR 40 MINUTES AND WEDGE LIVER BIOPSY    Surgeon(s):  Kilo Dunn MD    Surgical Assistant: Physician Assistant: LILA Wilson  Surg Asst-1: Jamey Mercado    Anesthesia: General     Estimated Blood Loss (mL): Minimal    Complications: None    Specimens:   ID Type Source Tests Collected by Time Destination   1 : WEDGE LIVER BIOPSY Preservative Liver  Kilo Dunn MD 11/10/2020 1316 Pathology        Implants:   Implant Name Type Inv. Item Serial No.  Lot No. LRB No. Used Action   BAND LAP AP SYS LG -- [employer] - [id number]  BAND LAP AP SYS LG -- [place] [id number] [employer]  N/A 1 Implanted       Drains:   [REMOVED] Hemovac Mid;Lower; Left Back (Removed)       [REMOVED] Hemovac Lower;Mid;Right Back (Removed)       [REMOVED] Arabella Curls #1 11/06/19 Left Knee (Removed)       Findings: dense adhesive disease    Electronically Signed by Yevgeniy Cuenca MD on 11/10/2020 at 2:02 PM

## 2020-11-10 NOTE — ANESTHESIA POSTPROCEDURE EVALUATION
Post-Anesthesia Evaluation and Assessment    Cardiovascular Function/Vital Signs  Visit Vitals  BP (!) 143/78   Pulse 89   Temp 36.4 °C (97.5 °F)   Resp 15   Ht 5' 5\" (1.651 m)   Wt 123 kg (271 lb 4 oz)   SpO2 96%   BMI 45.14 kg/m²       Patient is status post Procedure(s):  LAPAROSCOPIC GASTRIC ADJUSTABLE BAND, LYSIS OF ADHESIONS FOR 40 MINUTES AND WEDGE LIVER BIOPSY. Nausea/Vomiting: Controlled. Postoperative hydration reviewed and adequate. Pain:  Pain Scale 1: Visual (11/10/20 1502)  Pain Intensity 1: 0 (11/10/20 1502)   Managed. Neurological Status:   Neuro (WDL): Within Defined Limits (11/10/20 1113)   At baseline. Mental Status and Level of Consciousness: Arousable. Pulmonary Status:   O2 Device: Nasal cannula (11/10/20 1502)   Adequate oxygenation and airway patent. Complications related to anesthesia: None    Post-anesthesia assessment completed. No concerns. Patient has met all discharge requirements.     Signed By: Bonita Miles CRNA    November 10, 2020

## 2020-11-10 NOTE — PERIOP NOTES
Patient reports pain to abdomen and feels dizzy spoke with Dr. Shahzad Pickett who is covering Dr. Reyes Nearing and she said ok to give pill in phase 2 for pain.

## 2020-11-11 ENCOUNTER — NURSE NAVIGATOR (OUTPATIENT)
Dept: SURGERY | Age: 65
End: 2020-11-11

## 2020-11-11 VITALS
RESPIRATION RATE: 22 BRPM | OXYGEN SATURATION: 95 % | SYSTOLIC BLOOD PRESSURE: 148 MMHG | BODY MASS INDEX: 45.19 KG/M2 | DIASTOLIC BLOOD PRESSURE: 87 MMHG | HEART RATE: 94 BPM | TEMPERATURE: 97.7 F | HEIGHT: 65 IN | WEIGHT: 271.25 LBS

## 2020-11-11 PROCEDURE — 77010033678 HC OXYGEN DAILY

## 2020-11-11 PROCEDURE — 74011250636 HC RX REV CODE- 250/636: Performed by: SPECIALIST

## 2020-11-11 PROCEDURE — 99218 HC RM OBSERVATION: CPT

## 2020-11-11 PROCEDURE — 74011250637 HC RX REV CODE- 250/637: Performed by: SPECIALIST

## 2020-11-11 RX ORDER — ONDANSETRON 4 MG/1
4 TABLET, ORALLY DISINTEGRATING ORAL
Qty: 60 TAB | Refills: 0 | Status: SHIPPED | OUTPATIENT
Start: 2020-11-11

## 2020-11-11 RX ORDER — ENOXAPARIN SODIUM 100 MG/ML
40 INJECTION SUBCUTANEOUS EVERY 12 HOURS
Status: DISCONTINUED | OUTPATIENT
Start: 2020-11-11 | End: 2020-11-11 | Stop reason: HOSPADM

## 2020-11-11 RX ADMIN — OXYCODONE HYDROCHLORIDE AND ACETAMINOPHEN 1 TABLET: 5; 325 TABLET ORAL at 03:43

## 2020-11-11 RX ADMIN — ENOXAPARIN SODIUM 40 MG: 40 INJECTION SUBCUTANEOUS at 00:31

## 2020-11-11 RX ADMIN — OXYCODONE HYDROCHLORIDE AND ACETAMINOPHEN 1 TABLET: 5; 325 TABLET ORAL at 09:41

## 2020-11-11 NOTE — ROUTINE PROCESS
Bedside and Verbal shift change report given to EMMANUEL Lewis RN  (oncoming nurse) by  SHANTHI Peacock RN  (offgoing nurse). Report included the following information SBAR, Kardex, OR Summary, MAR and Recent Results.

## 2020-11-11 NOTE — PROGRESS NOTES
0730 Report received from Barbara Ziegler RN. Patient in satisfactory condition. Dsg CDI. VS stable. Patient denies pain at this time. IS educated and encouraged. Shift POC reviewed with patient. Menu/meal times explained. Call/bell phone within reach. Will monitor for changes. Plans to be d/c home later today. 0800 Supplemental oxygen discontinued. 95% on room air. 96% post ambulation in hallway.

## 2020-11-11 NOTE — PROGRESS NOTES
Transition of Care (JUHI) Plan:    Home with physician follow up     Chart reviewed. Pt admitted for an elective surgical procedure (LAPAROSCOPIC GASTRIC ADJUSTABLE BAND, LYSIS OF ADHESIONS FOR 40 MINUTES AND WEDGE LIVER BIOPSY). Pt is independent. Please encourage ambulation. No transition of care needs identified at this time. Anticipate pt will be medically stable for discharge within the next 24-48 hours with physician follow up. CM available to assist as needed. JUHI Transportation:   How is patient being transported at discharge? Family/Friend      When? Once cleared by physician     Is transport scheduled? N/A      Follow-up appointment and transportation:   PCP/Specialist?  See AVS for Appointment         Who is transporting to the follow-up appointment? Self/Family/Friend      Is transport for follow up appointment scheduled? N/A    Communication plan (with patient/family): Who is being called? Patient or Next of Kin? Responsible party? Patient      What number(s) is to be used? See Facesheet      What service provider is calling for [facility] services? When are they calling? Readmission Risk? (Green/Low; Yellow/Moderate; Red/High):  Green      Care Management Interventions  Mode of Transport at Discharge:  Other (see comment)(Family)  Transition of Care Consult (CM Consult): Discharge Planning  Health Maintenance Reviewed: Yes  Current Support Network: Lives with Spouse  The Plan for Transition of Care is Related to the Following Treatment Goals : Home with physician follow up   Discharge Location  Discharge Placement: Home with family assistance

## 2020-11-11 NOTE — PROGRESS NOTES
In to see patient. Wants pain medication and something to drink. Sats 88% with nausea. Explained that we were waiting for MD to put in orders. 2200 Has pain level 9 still in upper abdomen. Complains that it is hard for her to take in deep breath. IS encouraged. Up to bathroom with assistance. 2343 Up on side of bed sats  99% on 2L with pulse 99. Concerned that she doesn't have order for blood thinner. Will call MD for order. 0200 Up to bathroom to void with stand by assistance. Ambulating I ordaz. Able to belch. Smiling.

## 2020-11-11 NOTE — PROGRESS NOTES
0941  Pt stated pain is 7/10 medicated with 5mg of percocet    0942  Travis Petties in to see pt states pt can d/c home now does not have to wait until this afternoon    0947  Dual AVS reviewed with LEANN Law. All medications reviewed individually with patient. Opportunities for questions and concerns provided. Patient to be discharged via (mode of transport ie. Car, ambulance or air transport) car. Patient's arm band appropriately discarded.

## 2020-11-11 NOTE — NURSE NAVIGATOR
Patient alert and tolerating diet well. She stated she has had some nausea. RN sent Zofran to patient's pharmacy electronically. Vitals:   Blood pressure (!) 148/87, pulse 94, temperature 97.7 °F (36.5 °C), resp. rate 22, height 5' 5\" (1.651 m), weight 123 kg (271 lb 4 oz), SpO2 95 %. Output: reviewed, and patient verified urinating clear, yellow urine. Pulmonary: Clear in all lobes  Cardiac: Regular rate and rhythm  Abdomen: Bowel sounds active x4. Lap sites without erythema, swelling,  and/or drainage. SCD's: Positioned and operating WNL    Patient with expected pain, but is being managed and is currently tolerable. Patient has been ambulating the halls. Patient is able to swallow pills. Post-op diet progression discussed with patient. Patient to be discharged on a bariatric full liquid diet. Patient verbalized understanding of liquid diet and advancement to soft/pureed foods. Medications were discussed (i.e., pain- Percocet, Tylenol, not to use aspirin or ibuprofen based products, as well as steroids). Constipation was discussed and ways to alleviate it were discussed. Education completed on IS use and to ambulate every hour when at home. Patient completed a return demonstration on IS with no issues or concerns from this RN. Lovenox filled and in the home. Lovenox education provided, and patient will be administering herself. Post-op follow-up appt. talia scheduled.

## 2020-11-11 NOTE — PROGRESS NOTES
Summary :  Had complaints at beginning of shift regarding her nutrition, nausea and pain. Orders from Dr. Bjorn Baldwin. Up with assist. Oxygen saturation down to 88 % on room air. Oxygen started at 2 L and titrated down when trended up and remained off the rest of shift. Now off and has no complaints. Up in room. Surgical sites CDI. Voiding. Tolerating some liquids.

## 2020-11-11 NOTE — PROGRESS NOTES
Oral and Written notification given to patient and/or caregiver informing them that they are currently an Outpatient receiving care in our facility. Outpatient services include Observation Services under [facility] and [facility] requirements.

## 2020-11-11 NOTE — PROGRESS NOTES
Problem: Falls - Risk of  Goal: *Absence of Falls  Description: Document Ivjay Sol Fall Risk and appropriate interventions in the flowsheet.   Outcome: Progressing Towards Goal  Note: Fall Risk Interventions:  Mobility Interventions: Patient to call before getting OOB         Medication Interventions: Evaluate medications/consider consulting pharmacy, Patient to call before getting OOB

## 2020-11-12 ENCOUNTER — TELEPHONE (OUTPATIENT)
Dept: SURGERY | Age: 65
End: 2020-11-12

## 2020-11-12 NOTE — TELEPHONE ENCOUNTER
This RN spoke with patient post-operatively. Diet: Patient is tolerating her full liquid diet. Nausea and/or vomitting: None    Pain: Currently managed with Percocet     Lovenox injections: Administering every 12 hours, rotating sites. RN reminded patient to complete all injections, in which patientverbalized understanding. Lap sites: No erythema, drainage, and/or swelling    BM: None to date, but is passing flatus. Ambulation: Patient is walking throughout house every hour. IS: Patient continues to use 10x's per hour while awake. Patient is only at 1,000 mL. RN encouraged her to increase the amount each day. She verbalized understanding. Temperature: 98.9 degrees    Questions: None    This RN reminded the patient to contact the office with any questions and/or concerns. RN also reminded patient they will receive another follow-up TC prior to the two week post-op follow-up appointment. Patient verbalized understanding to both. Patient's two week post-op visit is scheduled and was confirmed.

## 2020-11-13 NOTE — OP NOTES
[facility]    Name:  Arron Altman  MR#:   [id number]  :  1955  ACCOUNT #:  [de-identified]  DATE OF SERVICE:  11/10/2020    PREOPERATIVE DIAGNOSES:  Persistent morbid obesity, status post laparoscopic gastric bypass procedure, done in [place]. POSTOPERATIVE DIAGNOSES:  1. Persistent morbid obesity, status post laparoscopic gastric bypass procedure, done in [place]. 2.  Fatty infiltration of liver. 3.  Extensive intra-abdominal adhesions. PROCEDURE PERFORMED:  1. Extensive laparoscopic lysis of adhesions in excess of 45 minutes' duration. 2.  Placement of laparoscopic adjustable gastric band with Lap-Band APL system. 3.  Wedge liver biopsy. SURGEON:  Lacie Foley MD    ASSISTANT:  AUSTYN Vicente MS. assisted with the procedure since there were no qualified surgeons, interns, or residents available. He assisted with exposure during the procedure, extensive adhesiolysis, placement of Lap-Band and port, closure of skin and fascial incisions. ANESTHESIA:  General endotracheal.    COMPLICATIONS:  None. SPECIMENS REMOVED:  Wedge liver biopsy specimen. IMPLANTS:  Lap-Band and port. ESTIMATED BLOOD LOSS:  Minimal.    INDICATIONS:  The patient is a 77-year-old female who had laparoscopic gastric bypass procedure performed in [place] in the past in . She presented to us in consultation for revision surgery, and she underwent upper GI study at that juncture. The upper GI study showed she had a very small gastric pouch not amenable to any type of revision. We did give her information on the Lap-Band as a salvage procedure in situation such as this. She reviewed the literature. She underwent a medical weight loss program, and at this time, she does wishes to proceed with Lap-Band placement, understanding it is a salvage operation for weight loss.   Of note, is the fact that we can never find any operative report, therefore, we did not know whether or not her Hannah limb is pulled in a retrogastric location. PROCEDURE:   The patient was brought to the operating room, placed on the table in supine position, at which time, general anesthesia was administered without any difficulty. Her abdomen was then prepped and draped in the usual sterile fashion. Using 15-blade, a 1-cm incision was made just to left of the umbilicus. Veress needle approach was used to gain access to the peritoneal cavity which was then insufflated. Bhargavi Hernández was then placed at that site. Then, two trocars were placed in the right upper quadrant region and one in left upper quadrant region. On inspection of retro-abdominal cavity, she had extensive adhesive disease present. I knew immediately that the Hannah limb was not visible. It was in a retrocolic and retrogastric location, and the entire left subhepatic space was completely scarred in for an unclear reason. I began with a very arduous adhesiolysis using the hook Bovie cautery and dissection gina to free up the entire left subhepatic space bringing up the omentum and gastric wall off of the left subhepatic space all the way to the level of the diaphragm just to expose the upper abdomen. The adhesiolysis in total took 45 minutes to achieve, and I could now visualize what I thought was the gastric pouch. There was conservative amount of fatty deposition everywhere making visualization very difficult. Therefore, I opened up the lesser omentum. There, we found the right crura and then I dissected lateral to the pouch, and I finally identified the angle of His. At first at distance in a pars flaccid technique and then I prepared the Lap-Band APL system. I then introduced intra-abdominally and I then placed the tubing of band within the grasper and brought the band into a normal position locking the band in place. The band was in good position.     I then at this juncture removed the liver retractor. I did biopsy this massive liver, which I noted at the beginning of the operation in a wedge-shaped fashion and submitted to Pathology for permanent section. I then externalized the tubing of the band. Once all areas were hemostatic,  I extended the right upper quadrant incision. I then exposed the fascia anteriorly. I then attached the port to the tubing, then the port to the fascia using the applier device. All areas were totally hemostatic. I then irrigated all incisions using normal saline solution, and they were all being closed using 4-0 subcuticular Monocryl. Steri-Strips and sterile dressings were applied. The patient tolerated the procedure well.       Rosalie Higuera MD      [id number]  D:  11/13/2020 7:35  T:  11/13/2020 12:05  JOB #:  [id number]

## 2020-11-18 ENCOUNTER — HOSPITAL ENCOUNTER (OUTPATIENT)
Age: 65
Setting detail: OUTPATIENT SURGERY
Discharge: HOME | End: 2020-11-18
Attending: SPECIALIST | Admitting: SPECIALIST
Payer: MEDICARE

## 2020-11-18 ENCOUNTER — APPOINTMENT (OUTPATIENT)
Dept: GENERAL RADIOLOGY | Age: 65
End: 2020-11-18
Attending: SPECIALIST
Payer: MEDICARE

## 2020-11-18 VITALS
HEIGHT: 65 IN | OXYGEN SATURATION: 100 % | BODY MASS INDEX: 44.4 KG/M2 | SYSTOLIC BLOOD PRESSURE: 155 MMHG | WEIGHT: 266.5 LBS | TEMPERATURE: 96.5 F | HEART RATE: 78 BPM | RESPIRATION RATE: 18 BRPM | DIASTOLIC BLOOD PRESSURE: 98 MMHG

## 2020-11-18 DIAGNOSIS — E66.01 MORBID OBESITY (HCC): ICD-10-CM

## 2020-11-18 PROCEDURE — 74240 X-RAY XM UPR GI TRC 1CNTRST: CPT

## 2020-11-18 PROCEDURE — 77030040361 HC SLV COMPR DVT MDII -B: Performed by: SPECIALIST

## 2020-11-18 PROCEDURE — 74011000255 HC RX REV CODE- 255: Performed by: SPECIALIST

## 2020-11-18 PROCEDURE — 2709999900 HC NON-CHARGEABLE SUPPLY: Performed by: SPECIALIST

## 2020-11-18 PROCEDURE — 76040000019: Performed by: SPECIALIST

## 2020-11-19 NOTE — OP NOTES
[address] REPORT    Name:  Thereasa Alpers  MR#:   [id number]  :  1955  ACCOUNT #:  [de-identified]  DATE OF SERVICE:  2020    PREOPERATIVE DIAGNOSIS:  Laparoscopic adjustable gastric band placement, would need for postoperative upper gastrointestinal study. POSTOPERATIVE DIAGNOSIS:  Laparoscopic adjustable gastric band placement, would need for postoperative upper gastrointestinal study. PROCEDURE PERFORMED:  Upper GI study with barium. SURGEON:  Ramesh Valdivia MD    ASSISTANT:  None. ANESTHESIA:  None. COMPLICATIONS:  None. SPECIMENS REMOVED:  None. IMPLANTS:  None. ESTIMATED BLOOD LOSS:  None. INDICATIONS:  The patient is a 71-year-old female, patient of mine, underwent Lap-Band placement last week. She has done well from her surgery, has lost 11 pounds already, and is here today for postoperative upper GI study to assess her gastric band placement. PROCEDURE:  The patient was brought to fluoro unit where she was given thin barium. On swallowing barium, she was noted to have normal peristalsis of her esophagus. She had filling in distal esophagus with tapering into and through the gastroesophageal junction. Contrast then filled the proximal gastric pouch which was tiny in nature. The Lap-Band was positioned two-thirds of the way down. The pouch with contrast flowing through the band into the more distal pouch and into the Hannah limb in a normal fashion. There was no evidence of any extravasation of contrast and the band appeared to be oriented perfectly at 45-degree angle.       Perez Peng MD      [id number]  D:  2020 13:47  T:  2020 21:56  JOB #:  [id number]

## 2020-11-23 ENCOUNTER — OFFICE VISIT (OUTPATIENT)
Dept: SURGERY | Age: 65
End: 2020-11-23
Payer: MEDICARE

## 2020-11-23 VITALS
OXYGEN SATURATION: 100 % | BODY MASS INDEX: 44.39 KG/M2 | DIASTOLIC BLOOD PRESSURE: 70 MMHG | TEMPERATURE: 97.6 F | HEIGHT: 65 IN | SYSTOLIC BLOOD PRESSURE: 123 MMHG | WEIGHT: 266.4 LBS

## 2020-11-23 DIAGNOSIS — Z98.84 S/P BARIATRIC SURGERY: ICD-10-CM

## 2020-11-23 DIAGNOSIS — K90.9 INTESTINAL MALABSORPTION, UNSPECIFIED TYPE: Primary | ICD-10-CM

## 2020-11-23 PROCEDURE — 99024 POSTOP FOLLOW-UP VISIT: CPT | Performed by: NURSE PRACTITIONER

## 2020-11-23 NOTE — PROGRESS NOTES
Subjective:      Hari Mejía is a 63 y.o. female is now 2 weeks status post laparoscopic adjustable gastric band surgery over gastric bypass. Doing well overall. She has lost a total of 5 pounds since surgery. Body mass index is 44.33 kg/m². Currently on a liquid diet without difficulty. Taking in 60 oz water daily. Sources of protein include protein shakes. 30 min of activity 5 days a week, including walking. Bowel movements are constipated. Pain is controlled without any medications at the port incision site. The patient is compliant with multivitamins. Surgery related complications: NA.  Liver bx report reviewed with patient. Weight Loss Metrics 11/23/2020 11/18/2020 11/10/2020 11/3/2020 10/26/2020 6/22/2020 12/30/2019   Today's Wt 266 lb 6.4 oz 266 lb 8 oz 271 lb 4 oz 276 lb 277 lb 9.6 oz 263 lb 252 lb   BMI 44.33 kg/m2 44.35 kg/m2 45.14 kg/m2 45.93 kg/m2 46.2 kg/m2 43.77 kg/m2 41.93 kg/m2          Comorbidities:    Hypertension: improved, on Rx, denies lightheadedness or dizziness  Diabetes: not applicable  Obstructive Sleep Apnea: not applicable  Hyperlipidemia: not applicable  Stress Urinary Incontinence: not applicable  Gastroesophageal Reflux: not applicable  Weight related arthropathy:unchanged    Denies diagnosis of COVID-19 since surgery.      Patient Active Problem List   Diagnosis Code    Spondylolisthesis of lumbar region M43.16    DDD (degenerative disc disease), lumbar M51.36    Status post lumbar surgery Z98.890    Hypertension I10    Chronic pain G89.29    Arthritis M19.90    Thromboembolus ([address] I74.9    Intestinal malabsorption K90.9    Morbid obesity with BMI of 40.0-44.9, adult ([address] E66.01, Z68.41    Morbid obesity (HCC) E66.01    Arthritis of left knee M17.12    Pulmonary embolism (HCC) I26.99    Status post gastric bypass for obesity Z98.84    H/O laparoscopic adjustable gastric banding Z98.84    S/P bariatric surgery Z98.84        Past Medical History: Diagnosis Date    Arthritis     osteo-lower back    Arthritis of left knee     Chronic pain     back    Hypertension     10 years    Intestinal malabsorption     Morbid [facility] [address]     Morbid obesity with BMI of 40.0-44.9, adult ([facility]     Pulmonary embolism ([facility] 1977    Status post gastric bypass for obesity     [place]    Thromboembolus [facility])        Past Surgical History:   Procedure Laterality Date    HX  SECTION      X 2    HX GASTRIC BYPASS  2005    HX KNEE REPLACEMENT Left 2019    Dr. Marielle Mcgarry    HX ORTHOPAEDIC  4-12    L4-L5 surgery    HX ROTATOR CUFF REPAIR Left     HX TUBAL LIGATION         Current Outpatient Medications   Medication Sig Dispense Refill    ondansetron (ZOFRAN ODT) 4 mg disintegrating tablet Take 1 Tab by mouth every six (6) hours as needed for Nausea or Vomiting. 60 Tab 0    GARLIC PO Take  by mouth as needed.  echinacea 400 mg cap Take  by mouth as needed.  diclofenac (SOLARAZE) 3 % topical gel Apply  to affected area two (2) times a day.  acetaminophen (TylenoL) 325 mg tablet Take 650 mg by mouth every four (4) hours as needed for Pain.  cyanocobalamin (VITAMIN B-12) 1,000 mcg sublingual tablet Take 1,000 mcg by mouth daily.  elderberry fruit (ELDERBERRY PO) Take  by mouth.  ergocalciferol (Vitamin D2) 1,250 mcg (50,000 unit) capsule TAKE 1 CAPSULE BY MOUTH TWICE A WEEK 8 Cap 0    diclofenac potassium (CATAFLAM) 50 mg tablet Take 50 mg by mouth three (3) times daily.  calcium carbonate (TUMS) 200 mg calcium (500 mg) chew Take 2 Tabs by mouth as needed.  losartan (COZAAR) 100 mg tablet Take 100 mg by mouth daily. Indications: hypertension      amLODIPine (NORVASC) 10 mg tablet Take 10 mg by mouth daily. Indications: hypertension      multivitamin (ONE A DAY) tablet Take 1 Tab by mouth daily.            No Known Allergies    Review of Symptoms:       General - No history or complaints of unexpected fever or chills  Head/Neck - No history or complaints of headache or dizziness  Cardiac - No history or complaints of chest pain, palpitations, or shortness of breath  Pulmonary - No history or complaints of shortness of breath or productive cough  Gastrointestinal - as noted above  Genitourinary - No history or complaints of hematuria/dysuria or renal lithiasis  Musculoskeletal - No history or complaints of joint  muscular weakness  Hematologic - No history of any bleeding episodes  Neurologic - No history or complaints of  migraine headaches or neurologic symptoms        Objective:     Visit Vitals  /70 (BP 1 Location: Left arm, BP Patient Position: Sitting)   Temp 97.6 °F (36.4 °C)   Ht 5' 5\" (1.651 m)   Wt 120.8 kg (266 lb 6.4 oz)   SpO2 100%   BMI 44.33 kg/m²       General:  alert, cooperative, no distress, appears stated age   Chest: lungs clear to auscultation, breath sounds equal and symmetric, no rhonchi, rales or wheezes, no accessory muscle use   Cor:   Regular rate and rhythm, S1S2 present or without murmur or extra heart sounds   Abdomen: soft, bowel sounds active, non-tender, no masses or organomegaly   Incisions:   healing well, no drainage, no erythema, no hernia, no seroma, no swelling, no dehiscence, incision well approximated       LIVER, WEDGE BIOPSY:   MILD STEATOSIS. NEGATIVE FOR ACTIVE HEPATITIS, FIBROSIS, AND HEMOSIDEROSIS. Assessment:   History of Morbid obesity, status post laparoscopic adjustable gastric band surgery. Doing well postoperatively. Constipation - add Miralax BID, can use milk of magnesia 1-2x/week    Plan:     1. Increase activity to the goal of 30 minutes daily and Increase fluids  2. Advance diet to soft solid phase. Reminded to measure portions, continue high protein, low carbohydrate diet. Reminded to eat regularly, to eat slowly & not to drink with meals. Refer to the handbook given in class. 3. Continue multivitamin   4.  Continue current medications and follow up with PCP for management of regimen. 5. Encouraged to attend support group   6. I have discussed this plan with patient and they verbalized understanding  7. Follow up in 2 weeks or sooner if patient has questions, concerns or worsening of condition, if unable to reach our office, patient should report to the ED. 8. Ms. Osei Schneider has a reminder for a \"due or due soon\" health maintenance. I have asked that she contact her primary care provider for a follow-up on this health maintenance.

## 2020-11-23 NOTE — PATIENT INSTRUCTIONS
Continue focus on hydration. May advance to soft diet. Please review material in your binder. Remember - your motto is \"soft foods with protein\". Eat regularly. Continue to use protein shakes. Remember to eat slowly & not to drink fluids with your meals. Increase activity as able - cardio (walking) only. Continue to take multi-vitamins. Continue regular follow-up with your PCP. Return to office in 2 weeks for your next appointment. Call the office if you have any questions or concerns.          May use docusate sodium as stool softener 2-3 x/week

## 2020-12-11 ENCOUNTER — OFFICE VISIT (OUTPATIENT)
Dept: SURGERY | Age: 65
End: 2020-12-11
Payer: MEDICARE

## 2020-12-11 VITALS
HEART RATE: 90 BPM | DIASTOLIC BLOOD PRESSURE: 100 MMHG | BODY MASS INDEX: 44.22 KG/M2 | WEIGHT: 265.4 LBS | HEIGHT: 65 IN | RESPIRATION RATE: 16 BRPM | SYSTOLIC BLOOD PRESSURE: 144 MMHG | OXYGEN SATURATION: 90 %

## 2020-12-11 DIAGNOSIS — E66.01 MORBID OBESITY (HCC): Primary | ICD-10-CM

## 2020-12-11 DIAGNOSIS — Z98.84 STATUS POST GASTRIC BANDING: ICD-10-CM

## 2020-12-11 PROCEDURE — G8755 DIAS BP > OR = 90: HCPCS | Performed by: SPECIALIST

## 2020-12-11 PROCEDURE — 3017F COLORECTAL CA SCREEN DOC REV: CPT | Performed by: SPECIALIST

## 2020-12-11 PROCEDURE — G8428 CUR MEDS NOT DOCUMENT: HCPCS | Performed by: SPECIALIST

## 2020-12-11 PROCEDURE — G8432 DEP SCR NOT DOC, RNG: HCPCS | Performed by: SPECIALIST

## 2020-12-11 PROCEDURE — G8753 SYS BP > OR = 140: HCPCS | Performed by: SPECIALIST

## 2020-12-11 PROCEDURE — S2083 ADJUSTMENT GASTRIC BAND: HCPCS | Performed by: SPECIALIST

## 2020-12-11 PROCEDURE — G8417 CALC BMI ABV UP PARAM F/U: HCPCS | Performed by: SPECIALIST

## 2020-12-11 PROCEDURE — 99213 OFFICE O/P EST LOW 20 MIN: CPT | Performed by: SPECIALIST

## 2020-12-11 NOTE — PROGRESS NOTES
Subjective:     Susan Westfall  is a 63 y.o. female who presents for follow-up about 1 month following band over existing gastric bypass. She has lost a total of 12 pounds since surgery. Body mass index is 44.16 kg/m². .    Weight Loss Metrics 12/11/2020 11/23/2020 11/18/2020 11/10/2020 11/3/2020 10/26/2020 6/22/2020   Today's Wt 265 lb 6.4 oz 266 lb 6.4 oz 266 lb 8 oz 271 lb 4 oz 276 lb 277 lb 9.6 oz 263 lb   BMI 44.16 kg/m2 44.33 kg/m2 44.35 kg/m2 45.14 kg/m2 45.93 kg/m2 46.2 kg/m2 43.77 kg/m2       Surgery related complication: NA       She reports no real issues and denies vomiting, abdominal pain, diarrhea and difficulty breathing. Weight Loss Metrics 12/11/2020 11/23/2020 11/18/2020 11/10/2020 11/3/2020 10/26/2020 6/22/2020   Today's Wt 265 lb 6.4 oz 266 lb 6.4 oz 266 lb 8 oz 271 lb 4 oz 276 lb 277 lb 9.6 oz 263 lb   BMI 44.16 kg/m2 44.33 kg/m2 44.35 kg/m2 45.14 kg/m2 45.93 kg/m2 46.2 kg/m2 43.77 kg/m2        The patient's exercise plan has been discussed with them and is unrestricted at this time. Changes in her medical history and medications have been reviewed.     Patient Active Problem List   Diagnosis Code    Spondylolisthesis of lumbar region M43.16    DDD (degenerative disc disease), lumbar M51.36    Status post lumbar surgery Z98.890    Hypertension I10    Chronic pain G89.29    Arthritis M19.90    Thromboembolus ([address] I74.9    Intestinal malabsorption K90.9    Morbid obesity with BMI of 40.0-44.9, adult (HCC) E66.01, Z68.41    Morbid obesity ([facility]) E66.01    Arthritis of left knee M17.12    Pulmonary embolism ([facility]) I26.99    Status post gastric bypass for obesity Z98.84    H/O laparoscopic adjustable gastric banding Z98.84    S/P bariatric surgery Z98.84     Past Medical History:   Diagnosis Date    Arthritis     osteo-lower back    Arthritis of left knee     Chronic pain     back    Hypertension     10 years    Intestinal malabsorption     Morbid obesity ([address]     Morbid obesity with BMI of 40.0-44.9, adult ([facility]     Pulmonary embolism ([facility]     Status post gastric bypass for [place]     [place]    Thromboembolus [facility])      Past Surgical History:   Procedure Laterality Date    HX  SECTION      X 2    HX GASTRIC BYPASS  2005    HX KNEE REPLACEMENT Left 2019    Dr. Lisseth Tavares    HX ORTHOPAEDIC  4-12    L4-L5 surgery    HX ROTATOR CUFF REPAIR Left     HX TUBAL LIGATION       Current Outpatient Medications   Medication Sig Dispense Refill    ondansetron (ZOFRAN ODT) 4 mg disintegrating tablet Take 1 Tab by mouth every six (6) hours as needed for Nausea or Vomiting. 60 Tab 0    GARLIC PO Take  by mouth as needed.  echinacea 400 mg cap Take  by mouth as needed.  diclofenac (SOLARAZE) 3 % topical gel Apply  to affected area two (2) times a day.  acetaminophen (TylenoL) 325 mg tablet Take 650 mg by mouth every four (4) hours as needed for Pain.  cyanocobalamin (VITAMIN B-12) 1,000 mcg sublingual tablet Take 1,000 mcg by mouth daily.  elderberry fruit (ELDERBERRY PO) Take  by mouth.  ergocalciferol (Vitamin D2) 1,250 mcg (50,000 unit) capsule TAKE 1 CAPSULE BY MOUTH TWICE A WEEK 8 Cap 0    diclofenac potassium (CATAFLAM) 50 mg tablet Take 50 mg by mouth three (3) times daily.  calcium carbonate (TUMS) 200 mg calcium (500 mg) chew Take 2 Tabs by mouth as needed.  losartan (COZAAR) 100 mg tablet Take 100 mg by mouth daily. Indications: hypertension      amLODIPine (NORVASC) 10 mg tablet Take 10 mg by mouth daily. Indications: hypertension      multivitamin (ONE A DAY) tablet Take 1 Tab by mouth daily.            Objective:     Visit Vitals  BP (!) 144/100 (BP 1 Location: Left arm, BP Patient Position: Sitting)   Pulse 90   Resp 16   Ht 5' 5\" (1.651 m)   Wt 120.4 kg (265 lb 6.4 oz)   SpO2 90%   BMI 44.16 kg/m²        Physical Exam:    General:  alert, cooperative, no distress, appears stated age   Heart:  Regular rate and rhythm   Abdomen:   abdomen is soft without significant tenderness, masses, organomegaly or guarding; Incisions: healing well       Assessment:     1. History of Morbid obesity, status post  band over existing bypass. The patient will get her first office adjustment today. Plan:     1. Remember to measure portions, continue low carbohydrate diet  2. See smart set for plan  3. Remember vitamin supplements. Start adult MVI, B-Complex, B-12, and Calcium supplements. Discussed importance as it relates to their malabsorptive state pertaining to vitamins. 4. Start Ursoforte for gastric bypass patients. 5. Increase aerobic exercise. 6. Attend support group  7. Follow-up in 5 week(s) in the clinic. 8. Total time spent with the patient was 20 minutes. [facility]  OFFICE PROCEDURE PROGRESS NOTE        Chart reviewed for the following:   ISuha MD, have reviewed the History, Physical and updated the Allergic reactions for [address] performed immediately prior to start of procedure:   Sheryl Mandujano MD, have performed the following reviews on Erica Love prior to the start of the procedure:            * Patient was identified by name and date of birth   * Agreement on procedure being performed was verified  * Risks and Benefits explained to the patient  * Procedure site verified and marked as necessary  * Patient was positioned for comfort  * Consent was signed and verified     Time: 1135      Date of procedure: 12/11/2020    Procedure performed by: Suha Kam MD    Patient assisted by: self    How tolerated by patient: tolerated the procedure well with no complications    Post Procedural Pain Scale: 0 - No Hurt    Comments: none              Subjective:   Erica Love is a 63 y.o. female with previous lap band surgery, who is here today needing lap band adjustment because of need for first adjustment.   Dietary compliance is good. Objective:     Visit Vitals  BP (!) 144/100 (BP 1 Location: Left arm, BP Patient Position: Sitting)   Pulse 90   Resp 16   Ht 5' 5\" (1.651 m)   Wt 120.4 kg (265 lb 6.4 oz)   SpO2 90%   BMI 44.16 kg/m²      Estimated body mass index is 44.16 kg/m² as calculated from the following:    Height as of this encounter: 5' 5\" (1.651 m). Weight as of this encounter: 120.4 kg (265 lb 6.4 oz). Wt Readings from Last 3 Encounters:   12/11/20 120.4 kg (265 lb 6.4 oz)   11/23/20 120.8 kg (266 lb 6.4 oz)   11/18/20 120.9 kg (266 lb 8 oz)     Her change in weight since last visit: lost,  12 lbs. since surgery    The surgical area looks well healed and the port is properly located. Band Position: NA. Assessment/Plan: Morbid Obesity, status post lap-band surgery, needing fill adjustment    Lap Band FiIl Procedure    The port area was cleaned with alcohol and a 25 gauge needle was inserted. Previous Fill Volume:  0 cc. Added:  2 cc   Total amount now in Band 2.0 ml    Patient tolerated the procedure well. Follow up in 4 to 6 weeks as needed.

## 2021-01-27 ENCOUNTER — APPOINTMENT (OUTPATIENT)
Dept: GENERAL RADIOLOGY | Age: 66
End: 2021-01-27
Attending: SPECIALIST
Payer: MEDICARE

## 2021-01-27 ENCOUNTER — HOSPITAL ENCOUNTER (OUTPATIENT)
Age: 66
Setting detail: OUTPATIENT SURGERY
Discharge: HOME | End: 2021-01-27
Attending: SPECIALIST | Admitting: SPECIALIST
Payer: MEDICARE

## 2021-01-27 VITALS
OXYGEN SATURATION: 99 % | SYSTOLIC BLOOD PRESSURE: 142 MMHG | HEIGHT: 65 IN | WEIGHT: 264.8 LBS | BODY MASS INDEX: 44.12 KG/M2 | DIASTOLIC BLOOD PRESSURE: 100 MMHG | RESPIRATION RATE: 18 BRPM | TEMPERATURE: 97.6 F | HEART RATE: 61 BPM

## 2021-01-27 DIAGNOSIS — E66.01 MORBID OBESITY (HCC): ICD-10-CM

## 2021-01-27 DIAGNOSIS — E66.01 MORBID OBESITY WITH BMI OF 40.0-44.9, ADULT (HCC): ICD-10-CM

## 2021-01-27 DIAGNOSIS — Z46.51 FITTING AND ADJUSTMENT OF GASTRIC LAP BAND: ICD-10-CM

## 2021-01-27 PROCEDURE — 76040000019: Performed by: SPECIALIST

## 2021-01-27 PROCEDURE — S2083 ADJUSTMENT GASTRIC BAND: HCPCS | Performed by: SPECIALIST

## 2021-01-27 PROCEDURE — 76000 FLUOROSCOPY <1 HR PHYS/QHP: CPT | Performed by: SPECIALIST

## 2021-01-27 PROCEDURE — 74011000255 HC RX REV CODE- 255: Performed by: SPECIALIST

## 2021-01-27 PROCEDURE — 76000 FLUOROSCOPY <1 HR PHYS/QHP: CPT

## 2021-01-27 PROCEDURE — 43999 UNLISTED PROCEDURE STOMACH: CPT | Performed by: SPECIALIST

## 2021-01-27 PROCEDURE — 74011000250 HC RX REV CODE- 250: Performed by: SPECIALIST

## 2021-01-27 RX ORDER — LIDOCAINE HYDROCHLORIDE 10 MG/ML
INJECTION INFILTRATION; PERINEURAL AS NEEDED
Status: DISCONTINUED | OUTPATIENT
Start: 2021-01-27 | End: 2021-01-27 | Stop reason: HOSPADM

## 2021-01-27 NOTE — PROCEDURES
Lap Band Encounter (fluroscopy clinic)    Miles Jacobsen is gastric banding patient who had her procedure on  .  her weight today is 120.1 kg (264 lb 12.8 oz), which correlates to  % EBW loss. she is here today for Lap Band Adjustment / Fill with Fluoroscopy Guidance. she notes the following issues related to the banding procedure; - here for first fluro adjustment.       Surgery related complications; band over bypass    Visit Vitals  BP (!) 142/100   Pulse 61   Temp 97.6 °F (36.4 °C)   Resp 18   Ht 5' 5\" (1.651 m)   Wt 120.1 kg (264 lb 12.8 oz)   SpO2 99%   BMI 44.07 kg/m²       Past Medical History:   Diagnosis Date    Arthritis     osteo-lower back    Arthritis of left knee     Chronic pain     back    Hypertension     10 years    Intestinal malabsorption     Morbid obesity ([address]     Morbid obesity with BMI of 40.0-44.9, adult ([address]     Pulmonary embolism ([address]     Status post gastric [place] for [place]     [place]    Thromboembolus [facility])      Past Surgical History:   Procedure Laterality Date    HX  SECTION      X 2    HX GASTRIC BYPASS  2005    HX KNEE REPLACEMENT Left 2019    Dr. Delta Escoto    HX ORTHOPAEDIC  4-12    L4-L5 surgery    HX ROTATOR CUFF REPAIR Left     HX TUBAL LIGATION       Current Facility-Administered Medications   Medication Dose Route Frequency Provider Last Rate Last Admin    barium sulfate (EZ PAQUE) 60 % (w/v) contrast susp    PRN Kevin Gallego MD   100 mL at 21 1521    lidocaine (XYLOCAINE) 10 mg/mL (1 %) injection    PRN Kevin Gallego MD   1.5 mL at 21 1523          Review of Symptoms:     General - No history or complaints of unexpected fever or chills  Cardiac - No history or complaints of chest pain, palpitations, or shortness of breath  Pulmonary - No history or complaints of shortness of breath or productive cough  Gastrointestinal - as noted above        Physical Exam:    General:  alert, cooperative, no distress, appears stated age   Abdomen:   abdomen is soft without significant tenderness, masses, organomegaly or guarding; port in place   Incisions: healing well, no significant drainage       Assessment:     1. History of Morbid obesity, status post gastric banding, given the fluro findings we will proceed with the following adjustment. Plan:     Previous Fill Volume: 2 ml   Removed:       Total fill volume after today's adjustment: 4.4  Added: 2.4 ml                Follow-up in PRN

## 2021-04-28 ENCOUNTER — HOSPITAL ENCOUNTER (OUTPATIENT)
Age: 66
Setting detail: OUTPATIENT SURGERY
Discharge: HOME | End: 2021-04-28
Attending: SPECIALIST | Admitting: SPECIALIST
Payer: MEDICARE

## 2021-04-28 ENCOUNTER — APPOINTMENT (OUTPATIENT)
Dept: GENERAL RADIOLOGY | Age: 66
End: 2021-04-28
Attending: SPECIALIST
Payer: MEDICARE

## 2021-04-28 ENCOUNTER — APPOINTMENT (OUTPATIENT)
Dept: SURGERY | Age: 66
End: 2021-04-28

## 2021-04-28 VITALS
WEIGHT: 263.13 LBS | RESPIRATION RATE: 20 BRPM | BODY MASS INDEX: 43.84 KG/M2 | DIASTOLIC BLOOD PRESSURE: 92 MMHG | TEMPERATURE: 98.6 F | OXYGEN SATURATION: 100 % | HEART RATE: 95 BPM | HEIGHT: 65 IN | SYSTOLIC BLOOD PRESSURE: 172 MMHG

## 2021-04-28 DIAGNOSIS — E66.01 MORBID OBESITY (HCC): ICD-10-CM

## 2021-04-28 DIAGNOSIS — Z46.51 FITTING AND ADJUSTMENT OF GASTRIC LAP BAND: ICD-10-CM

## 2021-04-28 DIAGNOSIS — K90.9 INTESTINAL MALABSORPTION, UNSPECIFIED TYPE: Primary | ICD-10-CM

## 2021-04-28 LAB
25(OH)D3 SERPL-MCNC: 64 NG/ML (ref 30–100)
ALBUMIN SERPL-MCNC: 3.7 G/DL (ref 3.4–5)
ALBUMIN/GLOB SERPL: 1.2 {RATIO} (ref 0.8–1.7)
ALP SERPL-CCNC: 90 U/L (ref 45–117)
ALT SERPL-CCNC: 34 U/L (ref 13–56)
ANION GAP SERPL CALC-SCNC: 4 MMOL/L (ref 3–18)
AST SERPL-CCNC: 22 U/L (ref 10–38)
BASOPHILS # BLD: 0.1 K/UL (ref 0–0.1)
BASOPHILS NFR BLD: 1 % (ref 0–2)
BILIRUB SERPL-MCNC: 0.4 MG/DL (ref 0.2–1)
BUN SERPL-MCNC: 16 MG/DL (ref 7–18)
BUN/CREAT SERPL: 23 (ref 12–20)
CALCIUM SERPL-MCNC: 9.3 MG/DL (ref 8.5–10.1)
CHLORIDE SERPL-SCNC: 104 MMOL/L (ref 100–111)
CO2 SERPL-SCNC: 33 MMOL/L (ref 21–32)
CREAT SERPL-MCNC: 0.71 MG/DL (ref 0.6–1.3)
DIFFERENTIAL METHOD BLD: ABNORMAL
EOSINOPHIL # BLD: 0.2 K/UL (ref 0–0.4)
EOSINOPHIL NFR BLD: 2 % (ref 0–5)
ERYTHROCYTE [DISTWIDTH] IN BLOOD BY AUTOMATED COUNT: 14.7 % (ref 11.6–14.5)
FERRITIN SERPL-MCNC: 45 NG/ML (ref 8–388)
FOLATE SERPL-MCNC: >20 NG/ML (ref 3.1–17.5)
GLOBULIN SER CALC-MCNC: 3.2 G/DL (ref 2–4)
GLUCOSE SERPL-MCNC: 89 MG/DL (ref 74–99)
HCT VFR BLD AUTO: 43.2 % (ref 35–45)
HGB BLD-MCNC: 13.6 G/DL (ref 12–16)
IRON SERPL-MCNC: 66 UG/DL (ref 50–175)
LYMPHOCYTES # BLD: 2.4 K/UL (ref 0.9–3.6)
LYMPHOCYTES NFR BLD: 28 % (ref 21–52)
MCH RBC QN AUTO: 27.4 PG (ref 24–34)
MCHC RBC AUTO-ENTMCNC: 31.5 G/DL (ref 31–37)
MCV RBC AUTO: 86.9 FL (ref 74–97)
MONOCYTES # BLD: 0.7 K/UL (ref 0.05–1.2)
MONOCYTES NFR BLD: 8 % (ref 3–10)
NEUTS SEG # BLD: 5.2 K/UL (ref 1.8–8)
NEUTS SEG NFR BLD: 61 % (ref 40–73)
PLATELET # BLD AUTO: 219 K/UL (ref 135–420)
PMV BLD AUTO: 11.1 FL (ref 9.2–11.8)
POTASSIUM SERPL-SCNC: 3.8 MMOL/L (ref 3.5–5.5)
PROT SERPL-MCNC: 6.9 G/DL (ref 6.4–8.2)
RBC # BLD AUTO: 4.97 M/UL (ref 4.2–5.3)
SODIUM SERPL-SCNC: 141 MMOL/L (ref 136–145)
VIT B12 SERPL-MCNC: >2000 PG/ML (ref 211–911)
WBC # BLD AUTO: 8.5 K/UL (ref 4.6–13.2)

## 2021-04-28 PROCEDURE — 80053 COMPREHEN METABOLIC PANEL: CPT

## 2021-04-28 PROCEDURE — 83540 ASSAY OF IRON: CPT

## 2021-04-28 PROCEDURE — 85025 COMPLETE CBC W/AUTO DIFF WBC: CPT

## 2021-04-28 PROCEDURE — 36415 COLL VENOUS BLD VENIPUNCTURE: CPT

## 2021-04-28 PROCEDURE — 76000 FLUOROSCOPY <1 HR PHYS/QHP: CPT

## 2021-04-28 PROCEDURE — 76040000019: Performed by: SPECIALIST

## 2021-04-28 PROCEDURE — 43999 UNLISTED PROCEDURE STOMACH: CPT | Performed by: SPECIALIST

## 2021-04-28 PROCEDURE — 99024 POSTOP FOLLOW-UP VISIT: CPT | Performed by: SPECIALIST

## 2021-04-28 PROCEDURE — 82306 VITAMIN D 25 HYDROXY: CPT

## 2021-04-28 PROCEDURE — 82728 ASSAY OF FERRITIN: CPT

## 2021-04-28 PROCEDURE — 82607 VITAMIN B-12: CPT

## 2021-04-28 PROCEDURE — 74011000250 HC RX REV CODE- 250: Performed by: SPECIALIST

## 2021-04-28 PROCEDURE — 84425 ASSAY OF VITAMIN B-1: CPT

## 2021-04-28 RX ORDER — LIDOCAINE HYDROCHLORIDE 10 MG/ML
INJECTION INFILTRATION; PERINEURAL AS NEEDED
Status: DISCONTINUED | OUTPATIENT
Start: 2021-04-28 | End: 2021-04-28 | Stop reason: HOSPADM

## 2021-04-28 NOTE — PROCEDURES
Lap Band Encounter (fluroscopy clinic)    Misty Lakhani is gastric banding patient who had her procedure on  .  her weight today is 119.4 kg (263 lb 2 oz), which correlates to  % EBW loss. she is here today for Lap Band Adjustment / Fill with Fluoroscopy Guidance. she notes the following issues related to the banding procedure; - here for routine adjustment.       Surgery related complications; NA    Visit Vitals  BP (!) 172/92   Pulse 95   Temp 98.6 °F (37 °C)   Resp 20   Ht 5' 5\" (1.651 m)   Wt 119.4 kg (263 lb 2 oz)   SpO2 100%   BMI 43.79 kg/m²       Past Medical History:   Diagnosis Date    Arthritis     osteo-lower back    Arthritis of left knee     Chronic pain     back    Hypertension     10 years    Intestinal malabsorption     Morbid obesity ([address]     Morbid obesity with BMI of 40.0-44.9, adult ([address]     [facility]     Status post gastric bypass for [place]    Thromboembolus [facility])      Past Surgical History:   Procedure Laterality Date    HX  SECTION      X 2    HX GASTRIC BYPASS  2005    HX KNEE REPLACEMENT Left 2019    Dr. Shavonne Cassidy    HX ORTHOPAEDIC  4-12    L4-L5 surgery    HX ROTATOR CUFF REPAIR Left     HX TUBAL LIGATION       Current Facility-Administered Medications   Medication Dose Route Frequency Provider Last Rate Last Admin    barium sulfate (EZ PAQUE) 60 % (w/v) contrast susp    PRN Roselyn Okeefe MD   100 mL at 21 1523    lidocaine (XYLOCAINE) 10 mg/mL (1 %) injection    PRN Roselyn Okeefe MD   1.5 mL at 21 1523          Review of Symptoms:     General - No history or complaints of unexpected fever or chills  Cardiac - No history or complaints of chest pain, palpitations, or shortness of breath  Pulmonary - No history or complaints of shortness of breath or productive cough  Gastrointestinal - as noted above        Physical Exam:    General:  alert, cooperative, no distress, appears stated age Abdomen:   abdomen is soft without significant tenderness, masses, organomegaly or guarding; port in place   Incisions: healing well, no significant drainage       Assessment:     1. History of Morbid obesity, status post gastric banding, given the fluro findings we will proceed with the following adjustment. Plan:     Previous Fill Volume: 4.4 ml   Removed:       Total fill volume after today's adjustment: 6.2  Added: 1.8 ml                Follow-up in PRN

## 2021-05-02 LAB — VIT B1 BLD-SCNC: 210.9 NMOL/L (ref 66.5–200)

## 2021-10-19 ENCOUNTER — DOCUMENTATION ONLY (OUTPATIENT)
Dept: SURGERY | Age: 66
End: 2021-10-19

## 2021-10-19 NOTE — PROGRESS NOTES
Per [facility] requirements;  E-mail and letter sent for follow up appointment. [facility]  [address]   [address], [address]                 [facility] [place]  [facility]  [facility]      Dear Patient,    Your health is our main concern. It is important for your health to have follow-up lab work and to see your surgeon at 2 months, 4 months, 6 months, 9 months and annually after your weight loss surgery. Additionally, the Department of Bariatric Surgery at our hospital is a member of the Metabolic and Bariatric Surgery Accreditation and Quality Improvement Program [facility]). As a participant in this program, we gather information on the outcomes of our patients after surgery. Please call the office for a follow up appointment at [phone]. If you have moved out of the area or have changed surgeons please call us and let us know the name of your doctor. Your health and feedback are important to us. We greatly appreciate your response.        Thank you,  [facility] [place] [facility] [address]

## 2022-03-18 PROBLEM — K90.9 INTESTINAL MALABSORPTION: Status: ACTIVE | Noted: 2019-08-26

## 2022-03-19 PROBLEM — Z98.890 STATUS POST LUMBAR SURGERY: Status: ACTIVE | Noted: 2017-11-15

## 2022-03-19 PROBLEM — Z98.84 H/O LAPAROSCOPIC ADJUSTABLE GASTRIC BANDING: Status: ACTIVE | Noted: 2020-11-10

## 2022-03-19 PROBLEM — Z98.84 S/P BARIATRIC SURGERY: Status: ACTIVE | Noted: 2020-11-23

## 2022-05-25 ENCOUNTER — HOSPITAL ENCOUNTER (OUTPATIENT)
Age: 67
Setting detail: OUTPATIENT SURGERY
Discharge: HOME | End: 2022-05-25
Attending: SPECIALIST | Admitting: SPECIALIST
Payer: MEDICARE

## 2022-05-25 ENCOUNTER — APPOINTMENT (OUTPATIENT)
Dept: SURGERY | Age: 67
End: 2022-05-25

## 2022-05-25 ENCOUNTER — APPOINTMENT (OUTPATIENT)
Dept: GENERAL RADIOLOGY | Age: 67
End: 2022-05-25
Attending: SPECIALIST
Payer: MEDICARE

## 2022-05-25 VITALS
TEMPERATURE: 97.9 F | DIASTOLIC BLOOD PRESSURE: 118 MMHG | BODY MASS INDEX: 44.3 KG/M2 | HEIGHT: 65 IN | HEART RATE: 61 BPM | OXYGEN SATURATION: 100 % | SYSTOLIC BLOOD PRESSURE: 184 MMHG | RESPIRATION RATE: 18 BRPM | WEIGHT: 265.9 LBS

## 2022-05-25 DIAGNOSIS — E66.01 MORBID OBESITY WITH BMI OF 40.0-44.9, ADULT (HCC): ICD-10-CM

## 2022-05-25 DIAGNOSIS — Z46.51 FITTING AND ADJUSTMENT OF GASTRIC LAP BAND: ICD-10-CM

## 2022-05-25 DIAGNOSIS — E66.01 MORBID OBESITY (HCC): ICD-10-CM

## 2022-05-25 PROCEDURE — 43999 UNLISTED PROCEDURE STOMACH: CPT | Performed by: SPECIALIST

## 2022-05-25 PROCEDURE — 74011000250 HC RX REV CODE- 250: Performed by: SPECIALIST

## 2022-05-25 PROCEDURE — 76000 FLUOROSCOPY <1 HR PHYS/QHP: CPT

## 2022-05-25 PROCEDURE — S2083 ADJUSTMENT GASTRIC BAND: HCPCS | Performed by: SPECIALIST

## 2022-05-25 RX ORDER — LIDOCAINE HYDROCHLORIDE 10 MG/ML
INJECTION INFILTRATION; PERINEURAL AS NEEDED
Status: DISCONTINUED | OUTPATIENT
Start: 2022-05-25 | End: 2022-05-25 | Stop reason: HOSPADM

## 2022-05-25 NOTE — PROCEDURES
Lap Band Encounter (fluroscopy clinic)    Milo Perera is gastric banding patient who had her procedure on  .  her weight today is 120.6 kg (265 lb 14.4 oz), which correlates to  % EBW loss. she is here today for Lap Band Adjustment / Fill with Fluoroscopy Guidance. she notes the following issues related to the banding procedure; - here for routine adjustment. Surgery related complications; band over bypass    Visit Vitals  BP (!) 184/118 Comment: Patient was advised to go to ED for evaluation.     Pulse 61   Temp 97.9 °F (36.6 °C)   Resp 18   Ht 5' 5\" (1.651 m)   Wt 120.6 kg (265 lb 14.4 oz)   SpO2 100%   BMI 44.25 kg/m²       Past Medical History:   Diagnosis Date    Arthritis     osteo-lower back    Arthritis of left knee     Chronic pain     back    Hypertension     10 years    Intestinal malabsorption     Morbid obesity ([address]     Morbid obesity with BMI of 40.0-44.9, adult ([address]     Pulmonary embolism ([address]     [place] post gastric [place] for [place]    Th[facility]boembolus [facility])      Past Surgical History:   Procedure Laterality Date    HX  SECTION      X 2    HX GASTRIC BYPASS  2005    HX KNEE REPLACEMENT Left 2019    Dr. Kyle Thomas    HX ORTHOPAEDIC  4-12    L4-L5 surgery    HX ROTATOR CUFF REPAIR Left     HX TUBAL LIGATION       Current Facility-Administered Medications   Medication Dose Route Frequency Provider Last Rate Last Admin    lidocaine (XYLOCAINE) 10 mg/mL (1 %) injection    PRN Elder Ramires MD   1.5 mL at 22 1406          Review of Symptoms:     General - No history or complaints of unexpected fever or chills  Cardiac - No history or complaints of chest pain, palpitations, or shortness of breath  Pulmonary - No history or complaints of shortness of breath or productive cough  Gastrointestinal - as noted above        Physical Exam:    General:  alert, cooperative, no distress, appears stated age   Abdomen:   abdomen is soft without significant tenderness, masses, organomegaly or guarding; port in place   Incisions: healing well, no significant drainage       Assessment:     1. History of Morbid obesity, status post gastric banding, given the fluro findings we will proceed with the following adjustment. Plan:     Previous Fill Volume: 4.4 ml   Removed:       Total fill volume after today's adjustment: 5.8  Added: 1.4 ml                Follow-up in PRN

## 2022-09-21 ENCOUNTER — HOSPITAL ENCOUNTER (OUTPATIENT)
Age: 67
Setting detail: OUTPATIENT SURGERY
Discharge: HOME | End: 2022-09-21
Attending: SPECIALIST | Admitting: SPECIALIST
Payer: MEDICARE

## 2022-09-21 ENCOUNTER — APPOINTMENT (OUTPATIENT)
Dept: SURGERY | Age: 67
End: 2022-09-21

## 2022-09-21 ENCOUNTER — APPOINTMENT (OUTPATIENT)
Dept: GENERAL RADIOLOGY | Age: 67
End: 2022-09-21
Attending: SPECIALIST
Payer: MEDICARE

## 2022-09-21 VITALS
OXYGEN SATURATION: 99 % | WEIGHT: 256.4 LBS | HEIGHT: 65 IN | BODY MASS INDEX: 42.72 KG/M2 | HEART RATE: 63 BPM | SYSTOLIC BLOOD PRESSURE: 148 MMHG | DIASTOLIC BLOOD PRESSURE: 93 MMHG | RESPIRATION RATE: 20 BRPM

## 2022-09-21 DIAGNOSIS — E66.01 MORBID OBESITY (HCC): ICD-10-CM

## 2022-09-21 DIAGNOSIS — Z46.51 FITTING AND ADJUSTMENT OF GASTRIC LAP BAND: Primary | ICD-10-CM

## 2022-09-21 DIAGNOSIS — E66.01 MORBID OBESITY WITH BODY MASS INDEX (BMI) OF 40.0 TO 44.9 IN ADULT (HCC): ICD-10-CM

## 2022-09-21 PROCEDURE — 76000 FLUOROSCOPY <1 HR PHYS/QHP: CPT

## 2022-09-21 PROCEDURE — 43999 UNLISTED PROCEDURE STOMACH: CPT | Performed by: SPECIALIST

## 2022-09-21 PROCEDURE — 74011000250 HC RX REV CODE- 250: Performed by: SPECIALIST

## 2022-09-21 RX ORDER — LIDOCAINE HYDROCHLORIDE 10 MG/ML
INJECTION INFILTRATION; PERINEURAL AS NEEDED
Status: DISCONTINUED | OUTPATIENT
Start: 2022-09-21 | End: 2022-09-21 | Stop reason: HOSPADM

## 2022-09-21 NOTE — PROCEDURES
Lap Band Encounter (fluroscopy clinic)    Su Camarena is gastric banding patient who had her procedure on  .  her weight today is 116.3 kg (256 lb 6.4 oz), which correlates to  % EBW loss. she is here today for Lap Band Adjustment / Fill with Fluoroscopy Guidance. she notes the following issues related to the banding procedure; - here for routine adjustment.       Surgery related complications; band over bypass    Visit Vitals  BP (!) 148/93   Pulse 63   Resp 20   Ht 5' 5\" (1.651 m)   Wt 116.3 kg (256 lb 6.4 oz)   SpO2 99%   BMI 42.67 kg/m²       Past Medical History:   Diagnosis Date    Arthritis     osteo-lower back    Arthritis of left knee     Chronic pain     back    Hypertension     10 years    Intestinal malabsorption     Morbid obesity ([address]     Morbid obesity with BMI of 40.0-44.9, adult ([address]     Pulmonary embolism ([address]     Status post gastric bypass for obesity     [place]    Thromboembolus [facility])      Past Surgical History:   Procedure Laterality Date    HX  SECTION      X 2    HX GASTRIC BYPASS  2005    HX KNEE REPLACEMENT Left 2019    Dr. Brennon Dobbs ORTHOPAEDIC  4-12    L4-L5 surgery    HX ROTATOR CUFF REPAIR Left     HX TUBAL LIGATION       Current Facility-Administered Medications   Medication Dose Route Frequency Provider Last Rate Last Admin    barium sulfate (EZ PAQUE) 60 % (w/v) contrast susp    PRN Juancho Recinos MD   100 mL at 22 1453    lidocaine (XYLOCAINE) 10 mg/mL (1 %) injection    PRN Juancho Recinos MD   1.5 mL at 22 1453          Review of Symptoms:     General - No history or complaints of unexpected fever or chills  Cardiac - No history or complaints of chest pain, palpitations, or shortness of breath  Pulmonary - No history or complaints of shortness of breath or productive cough  Gastrointestinal - as noted above        Physical Exam:    General:  alert, cooperative, no distress, appears stated age   Abdomen:   abdomen is soft without significant tenderness, masses, organomegaly or guarding; port in place   Incisions: healing well       Assessment:     1. History of Morbid obesity, status post gastric banding, given the fluro findings we will proceed with the following adjustment. Plan:     Previous Fill Volume: 5.8 ml   Removed:       Total fill volume after today's adjustment: 6.6  Added: 0.8 ml                Follow-up in PRN

## 2023-03-22 ENCOUNTER — OFFICE VISIT (OUTPATIENT)
Age: 68
End: 2023-03-22

## 2023-03-22 VITALS
WEIGHT: 271.9 LBS | HEIGHT: 65 IN | HEART RATE: 70 BPM | OXYGEN SATURATION: 100 % | SYSTOLIC BLOOD PRESSURE: 121 MMHG | TEMPERATURE: 97.5 F | RESPIRATION RATE: 16 BRPM | DIASTOLIC BLOOD PRESSURE: 66 MMHG | BODY MASS INDEX: 45.3 KG/M2

## 2023-03-22 DIAGNOSIS — R13.10 DYSPHAGIA, UNSPECIFIED TYPE: Primary | ICD-10-CM

## 2023-03-22 PROCEDURE — 3078F DIAST BP <80 MM HG: CPT | Performed by: SPECIALIST

## 2023-03-22 RX ORDER — ALBUTEROL SULFATE 2.5 MG/3ML
SOLUTION RESPIRATORY (INHALATION)
COMMUNITY
Start: 2023-01-23

## 2023-03-22 RX ORDER — LOSARTAN POTASSIUM AND HYDROCHLOROTHIAZIDE 12.5; 1 MG/1; MG/1
TABLET ORAL
COMMUNITY
Start: 2023-02-03

## 2023-03-22 RX ORDER — IBUPROFEN 800 MG/1
TABLET ORAL
COMMUNITY
Start: 2023-03-20

## 2023-03-22 RX ORDER — ALBUTEROL SULFATE 90 UG/1
AEROSOL, METERED RESPIRATORY (INHALATION)
COMMUNITY
Start: 2023-01-23

## 2023-03-22 ASSESSMENT — PATIENT HEALTH QUESTIONNAIRE - PHQ9
2. FEELING DOWN, DEPRESSED OR HOPELESS: 0
SUM OF ALL RESPONSES TO PHQ QUESTIONS 1-9: 0
1. LITTLE INTEREST OR PLEASURE IN DOING THINGS: 0
SUM OF ALL RESPONSES TO PHQ9 QUESTIONS 1 & 2: 0
SUM OF ALL RESPONSES TO PHQ QUESTIONS 1-9: 0

## 2023-04-07 NOTE — PATIENT INSTRUCTIONS
increase your chances of quitting for good. Limit alcohol to 2 drinks a day for men and 1 drink a day for women. Too much alcohol can cause health problems. If you have a BMI higher than 25  Your doctor may do other tests to check your risk for weight-related health problems. This may include measuring the distance around your waist. A waist measurement of more than 40 inches in men or 35 inches in women can increase the risk of weight-related health problems. Talk with your doctor about steps you can take to stay healthy or improve your health. You may need to make lifestyle changes to lose weight and stay healthy, such as changing your diet and getting regular exercise. If you have a BMI lower than 18.5  Your doctor may do other tests to check your risk for health problems. Talk with your doctor about steps you can take to stay healthy or improve your health. You may need to make lifestyle changes to gain or maintain weight and stay healthy, such as getting more healthy foods in your diet and doing exercises to build muscle. Where can you learn more? Go to http://lauren-dragan.info/. Enter S176 in the search box to learn more about \"Body Mass Index: Care Instructions. \"  Current as of: June 26, 2018  Content Version: 11.8  © 5569-1924 [employer]. Care instructions adapted under license by [employer] (which disclaims liability or warranty for this information). If you have questions about a medical condition or this instruction, always ask your healthcare professional. [place] [address] any warranty or liability for your use of this information.

## 2023-11-06 ENCOUNTER — OFFICE VISIT (OUTPATIENT)
Age: 68
End: 2023-11-06
Payer: MEDICARE

## 2023-11-06 VITALS
HEIGHT: 65 IN | DIASTOLIC BLOOD PRESSURE: 87 MMHG | WEIGHT: 273.1 LBS | TEMPERATURE: 96.8 F | BODY MASS INDEX: 45.5 KG/M2 | SYSTOLIC BLOOD PRESSURE: 129 MMHG | HEART RATE: 80 BPM | OXYGEN SATURATION: 100 %

## 2023-11-06 DIAGNOSIS — Z98.84 H/O LAPAROSCOPIC ADJUSTABLE GASTRIC BANDING: ICD-10-CM

## 2023-11-06 DIAGNOSIS — Z98.84 S/P GASTRIC BYPASS: ICD-10-CM

## 2023-11-06 DIAGNOSIS — E55.9 HYPOVITAMINOSIS D: ICD-10-CM

## 2023-11-06 DIAGNOSIS — K90.9 INTESTINAL MALABSORPTION, UNSPECIFIED TYPE: Primary | ICD-10-CM

## 2023-11-06 PROCEDURE — G8417 CALC BMI ABV UP PARAM F/U: HCPCS | Performed by: NURSE PRACTITIONER

## 2023-11-06 PROCEDURE — 3079F DIAST BP 80-89 MM HG: CPT | Performed by: NURSE PRACTITIONER

## 2023-11-06 PROCEDURE — G8427 DOCREV CUR MEDS BY ELIG CLIN: HCPCS | Performed by: NURSE PRACTITIONER

## 2023-11-06 PROCEDURE — 1123F ACP DISCUSS/DSCN MKR DOCD: CPT | Performed by: NURSE PRACTITIONER

## 2023-11-06 PROCEDURE — 1036F TOBACCO NON-USER: CPT | Performed by: NURSE PRACTITIONER

## 2023-11-06 PROCEDURE — 1090F PRES/ABSN URINE INCON ASSESS: CPT | Performed by: NURSE PRACTITIONER

## 2023-11-06 PROCEDURE — G8484 FLU IMMUNIZE NO ADMIN: HCPCS | Performed by: NURSE PRACTITIONER

## 2023-11-06 PROCEDURE — 99214 OFFICE O/P EST MOD 30 MIN: CPT | Performed by: NURSE PRACTITIONER

## 2023-11-06 PROCEDURE — G8400 PT W/DXA NO RESULTS DOC: HCPCS | Performed by: NURSE PRACTITIONER

## 2023-11-06 PROCEDURE — 3074F SYST BP LT 130 MM HG: CPT | Performed by: NURSE PRACTITIONER

## 2023-11-06 PROCEDURE — 3017F COLORECTAL CA SCREEN DOC REV: CPT | Performed by: NURSE PRACTITIONER

## 2023-11-06 RX ORDER — TIZANIDINE 2 MG/1
TABLET ORAL
COMMUNITY
Start: 2023-10-30

## 2023-11-06 RX ORDER — DULOXETIN HYDROCHLORIDE 30 MG/1
CAPSULE, DELAYED RELEASE ORAL
COMMUNITY
Start: 2023-11-03

## 2023-11-06 NOTE — PATIENT INSTRUCTIONS
Patient Instructions      Remember hydration goals - minimum of 64 ounces of liquids per day (dehydration is the number one reason for hospital readmission). Continue to monitor carbohydrate and protein intake you need a minimum of  Grams of protein daily- remember to keep your total carbohydrates to 50 grams or less per day for best results. Continue to work towards exercise goals - 60-90 minutes, 5 times a week minimum of deliberate, aerobic exercise is the ultimate goal with strength training 2 times each week. Refer to [employer] for  information. Remember to take vitamins as directed. Attend support group the 2nd Thursday of each month. 6.  Constipation: Milk of Magnesia is for immediate relief only. Miralax is to be used every day if constipation is a chronic problem. 7.  Diarrhea: patients will occasionally develop lactose intolerance after surgery. Check to see if your protein shake has whey in it. If it does try a protein powder or drink that does not have whey and stop all yogurts, cheeses and milks to see if the diarrhea goes away. 8.  If you have had labs drawn. We will only call you if you have abnormal results. Otherwise you can access the lab results in \"[facility]\". You will only need the access code the first time you sign on. 9.  Call us at [phone] or email us through [employer]" with questions,     concerns or worsening of condition, we have someone on call 24 hours a day. If you are unable to reach our office, you are to go to your Primary Care Physician or the Emergency Department.      NOTE TO GASTRIC BYPASS PATIENTS:  (SAME APPLIES TO GASTRIC SLEEVE PATIENTS FOR FIRST TWO MONTHS)  Remember that for the rest of your life, you are not able to take the following:  - NSAIDs (ibuprofen, goody powder, BC powder, Motrin, Advil, Mobic, Voltaren, Excedrin, etc.)  - Steroid pills or injections  - Smoke (cigarettes or recreational drugs)  - Alcohol  Use of

## 2023-11-08 ENCOUNTER — OFFICE VISIT (OUTPATIENT)
Age: 68
End: 2023-11-08
Payer: MEDICARE

## 2023-11-08 ENCOUNTER — HOSPITAL ENCOUNTER (OUTPATIENT)
Facility: HOSPITAL | Age: 68
Setting detail: OUTPATIENT SURGERY
Discharge: HOME | End: 2023-11-11
Payer: MEDICARE

## 2023-11-08 ENCOUNTER — HOSPITAL ENCOUNTER (OUTPATIENT)
Facility: HOSPITAL | Age: 68
Discharge: HOME | End: 2023-11-11
Payer: MEDICARE

## 2023-11-08 VITALS
WEIGHT: 269.6 LBS | HEIGHT: 65 IN | SYSTOLIC BLOOD PRESSURE: 138 MMHG | BODY MASS INDEX: 44.92 KG/M2 | OXYGEN SATURATION: 98 % | RESPIRATION RATE: 18 BRPM | DIASTOLIC BLOOD PRESSURE: 75 MMHG | HEART RATE: 77 BPM

## 2023-11-08 VITALS
HEIGHT: 65 IN | DIASTOLIC BLOOD PRESSURE: 86 MMHG | WEIGHT: 267.8 LBS | OXYGEN SATURATION: 100 % | HEART RATE: 66 BPM | TEMPERATURE: 98.6 F | SYSTOLIC BLOOD PRESSURE: 135 MMHG | BODY MASS INDEX: 44.62 KG/M2

## 2023-11-08 DIAGNOSIS — E66.01 MORBID OBESITY (HCC): ICD-10-CM

## 2023-11-08 DIAGNOSIS — R13.10 DYSPHAGIA, UNSPECIFIED TYPE: Primary | ICD-10-CM

## 2023-11-08 PROCEDURE — G8428 CUR MEDS NOT DOCUMENT: HCPCS | Performed by: SPECIALIST

## 2023-11-08 PROCEDURE — 43999 UNLISTED PROCEDURE STOMACH: CPT | Performed by: SPECIALIST

## 2023-11-08 PROCEDURE — 2500000003 HC RX 250 WO HCPCS: Performed by: SPECIALIST

## 2023-11-08 PROCEDURE — 43999 UNLISTED PROCEDURE STOMACH: CPT

## 2023-11-08 PROCEDURE — 74220 X-RAY XM ESOPHAGUS 1CNTRST: CPT

## 2023-11-08 PROCEDURE — G8484 FLU IMMUNIZE NO ADMIN: HCPCS | Performed by: SPECIALIST

## 2023-11-08 PROCEDURE — 3079F DIAST BP 80-89 MM HG: CPT | Performed by: SPECIALIST

## 2023-11-08 PROCEDURE — 3017F COLORECTAL CA SCREEN DOC REV: CPT | Performed by: SPECIALIST

## 2023-11-08 PROCEDURE — 99214 OFFICE O/P EST MOD 30 MIN: CPT | Performed by: SPECIALIST

## 2023-11-08 PROCEDURE — 1036F TOBACCO NON-USER: CPT | Performed by: SPECIALIST

## 2023-11-08 PROCEDURE — 1123F ACP DISCUSS/DSCN MKR DOCD: CPT | Performed by: SPECIALIST

## 2023-11-08 PROCEDURE — G8400 PT W/DXA NO RESULTS DOC: HCPCS | Performed by: SPECIALIST

## 2023-11-08 PROCEDURE — G8417 CALC BMI ABV UP PARAM F/U: HCPCS | Performed by: SPECIALIST

## 2023-11-08 PROCEDURE — 99213 OFFICE O/P EST LOW 20 MIN: CPT | Performed by: SPECIALIST

## 2023-11-08 PROCEDURE — 3075F SYST BP GE 130 - 139MM HG: CPT | Performed by: SPECIALIST

## 2023-11-08 PROCEDURE — 1090F PRES/ABSN URINE INCON ASSESS: CPT | Performed by: SPECIALIST

## 2023-11-08 RX ORDER — VITAMIN B COMPLEX
1 CAPSULE ORAL DAILY
COMMUNITY

## 2023-11-08 RX ORDER — LIDOCAINE HYDROCHLORIDE 10 MG/ML
1 INJECTION, SOLUTION EPIDURAL; INFILTRATION; INTRACAUDAL; PERINEURAL ONCE
Status: COMPLETED | OUTPATIENT
Start: 2023-11-08 | End: 2023-11-08

## 2023-11-08 RX ADMIN — LIDOCAINE HYDROCHLORIDE 1 ML: 10 INJECTION, SOLUTION EPIDURAL; INFILTRATION; INTRACAUDAL; PERINEURAL at 14:50

## 2023-11-08 RX ADMIN — BARIUM SULFATE 100 ML: 960 POWDER, FOR SUSPENSION ORAL at 14:49

## 2023-11-08 NOTE — PROGRESS NOTES
[facility] UGI/LAP BAND FOCUS NOTE      Date:  11/8/2023  Time:  2:45 PM    Patient:  Hector Bunting  Procedure:  UGI/Lap Band Fill      Patient Questions  Lap Band Adjustment Patient Questionnaire: If female, are you pregnant?  []Yes     [x]No  Tolerates thick liquids:  []Well   []1     [x]2     []3     []4     []5     []Poorly  Tolerates red meat:  []Well   []1     []2     [x]3     []4     []5     [] Poorly  Tolerates chicken:  []Well   []1     [x]2     []3     []4     []5     []Poorly  Tolerates fish:   []Well   [x]1     []2     []3     []4     []5     []Poorly  Hunger is:   []Well Controlled     []1     []2     [x]3     []4     []5      [] Poorly Controlled  Nightime regurgitation:  []Never     [x]1     []2     []3     []4     []5     []Frequently    Lap Band Info:  Band Type  []Realize     []Realize-C     []AP     []VG     []10cm     []Unknown  []Other      Comments:        Arneta Kawasaki, RN

## 2024-11-20 ENCOUNTER — OFFICE VISIT (OUTPATIENT)
Age: 69
End: 2024-11-20
Payer: MEDICARE

## 2024-11-20 ENCOUNTER — HOSPITAL ENCOUNTER (OUTPATIENT)
Facility: HOSPITAL | Age: 69
Discharge: HOME | End: 2024-11-23
Payer: MEDICARE

## 2024-11-20 VITALS
OXYGEN SATURATION: 100 % | BODY MASS INDEX: 38.2 KG/M2 | HEART RATE: 72 BPM | TEMPERATURE: 97.3 F | SYSTOLIC BLOOD PRESSURE: 130 MMHG | DIASTOLIC BLOOD PRESSURE: 76 MMHG | HEIGHT: 65 IN | WEIGHT: 229.3 LBS

## 2024-11-20 DIAGNOSIS — K90.9 INTESTINAL MALABSORPTION, UNSPECIFIED TYPE: ICD-10-CM

## 2024-11-20 DIAGNOSIS — K90.9 INTESTINAL MALABSORPTION, UNSPECIFIED TYPE: Primary | ICD-10-CM

## 2024-11-20 LAB
25(OH)D3 SERPL-MCNC: 99.5 NG/ML (ref 30–100)
ALBUMIN SERPL-MCNC: 3.9 G/DL (ref 3.4–5)
ALBUMIN/GLOB SERPL: 1.2 (ref 0.8–1.7)
ALP SERPL-CCNC: 94 U/L (ref 45–117)
ALT SERPL-CCNC: 31 U/L (ref 13–56)
ANION GAP SERPL CALC-SCNC: 3 MMOL/L (ref 3–18)
AST SERPL-CCNC: 32 U/L (ref 10–38)
BASOPHILS # BLD: 0.1 K/UL (ref 0–0.1)
BASOPHILS NFR BLD: 1 % (ref 0–2)
BILIRUB SERPL-MCNC: 0.4 MG/DL (ref 0.2–1)
BUN SERPL-MCNC: 35 MG/DL (ref 7–18)
BUN/CREAT SERPL: 43 (ref 12–20)
CALCIUM SERPL-MCNC: 10.7 MG/DL (ref 8.5–10.1)
CHLORIDE SERPL-SCNC: 105 MMOL/L (ref 100–111)
CO2 SERPL-SCNC: 35 MMOL/L (ref 21–32)
CREAT SERPL-MCNC: 0.82 MG/DL (ref 0.6–1.3)
DIFFERENTIAL METHOD BLD: NORMAL
EOSINOPHIL # BLD: 0.2 K/UL (ref 0–0.4)
EOSINOPHIL NFR BLD: 2 % (ref 0–5)
ERYTHROCYTE [DISTWIDTH] IN BLOOD BY AUTOMATED COUNT: 13.9 % (ref 11.6–14.5)
FERRITIN SERPL-MCNC: 57 NG/ML (ref 8–388)
FOLATE SERPL-MCNC: >20 NG/ML (ref 3.1–17.5)
GLOBULIN SER CALC-MCNC: 3.2 G/DL (ref 2–4)
GLUCOSE SERPL-MCNC: 95 MG/DL (ref 74–99)
HCT VFR BLD AUTO: 40.8 % (ref 35–45)
HGB BLD-MCNC: 13.1 G/DL (ref 12–16)
IMM GRANULOCYTES # BLD AUTO: 0 K/UL (ref 0–0.04)
IMM GRANULOCYTES NFR BLD AUTO: 0 % (ref 0–0.5)
IRON SERPL-MCNC: 48 UG/DL (ref 50–175)
LYMPHOCYTES # BLD: 2.2 K/UL (ref 0.9–3.6)
LYMPHOCYTES NFR BLD: 25 % (ref 21–52)
MCH RBC QN AUTO: 28.1 PG (ref 24–34)
MCHC RBC AUTO-ENTMCNC: 32.1 G/DL (ref 31–37)
MCV RBC AUTO: 87.4 FL (ref 78–100)
MONOCYTES # BLD: 0.7 K/UL (ref 0.05–1.2)
MONOCYTES NFR BLD: 7 % (ref 3–10)
NEUTS SEG # BLD: 5.9 K/UL (ref 1.8–8)
NEUTS SEG NFR BLD: 65 % (ref 40–73)
NRBC # BLD: 0 K/UL (ref 0–0.01)
NRBC BLD-RTO: 0 PER 100 WBC
PLATELET # BLD AUTO: 252 K/UL (ref 135–420)
PMV BLD AUTO: 10.9 FL (ref 9.2–11.8)
POTASSIUM SERPL-SCNC: 3.4 MMOL/L (ref 3.5–5.5)
PROT SERPL-MCNC: 7.1 G/DL (ref 6.4–8.2)
RBC # BLD AUTO: 4.67 M/UL (ref 4.2–5.3)
SODIUM SERPL-SCNC: 143 MMOL/L (ref 136–145)
VIT B12 SERPL-MCNC: 574 PG/ML (ref 211–911)
WBC # BLD AUTO: 9 K/UL (ref 4.6–13.2)

## 2024-11-20 PROCEDURE — 82306 VITAMIN D 25 HYDROXY: CPT

## 2024-11-20 PROCEDURE — 85025 COMPLETE CBC W/AUTO DIFF WBC: CPT

## 2024-11-20 PROCEDURE — 83540 ASSAY OF IRON: CPT

## 2024-11-20 PROCEDURE — 1090F PRES/ABSN URINE INCON ASSESS: CPT | Performed by: SPECIALIST

## 2024-11-20 PROCEDURE — 84425 ASSAY OF VITAMIN B-1: CPT

## 2024-11-20 PROCEDURE — 82746 ASSAY OF FOLIC ACID SERUM: CPT

## 2024-11-20 PROCEDURE — 3017F COLORECTAL CA SCREEN DOC REV: CPT | Performed by: SPECIALIST

## 2024-11-20 PROCEDURE — 3078F DIAST BP <80 MM HG: CPT | Performed by: SPECIALIST

## 2024-11-20 PROCEDURE — G8417 CALC BMI ABV UP PARAM F/U: HCPCS | Performed by: SPECIALIST

## 2024-11-20 PROCEDURE — G8400 PT W/DXA NO RESULTS DOC: HCPCS | Performed by: SPECIALIST

## 2024-11-20 PROCEDURE — 1036F TOBACCO NON-USER: CPT | Performed by: SPECIALIST

## 2024-11-20 PROCEDURE — 3074F SYST BP LT 130 MM HG: CPT | Performed by: SPECIALIST

## 2024-11-20 PROCEDURE — 82728 ASSAY OF FERRITIN: CPT

## 2024-11-20 PROCEDURE — G8427 DOCREV CUR MEDS BY ELIG CLIN: HCPCS | Performed by: SPECIALIST

## 2024-11-20 PROCEDURE — 36415 COLL VENOUS BLD VENIPUNCTURE: CPT

## 2024-11-20 PROCEDURE — 82607 VITAMIN B-12: CPT

## 2024-11-20 PROCEDURE — 1159F MED LIST DOCD IN RCRD: CPT | Performed by: SPECIALIST

## 2024-11-20 PROCEDURE — 80053 COMPREHEN METABOLIC PANEL: CPT

## 2024-11-20 PROCEDURE — G8484 FLU IMMUNIZE NO ADMIN: HCPCS | Performed by: SPECIALIST

## 2024-11-20 PROCEDURE — 99215 OFFICE O/P EST HI 40 MIN: CPT | Performed by: SPECIALIST

## 2024-11-20 PROCEDURE — 1160F RVW MEDS BY RX/DR IN RCRD: CPT | Performed by: SPECIALIST

## 2024-11-20 PROCEDURE — 1123F ACP DISCUSS/DSCN MKR DOCD: CPT | Performed by: SPECIALIST

## 2024-11-20 PROCEDURE — 1126F AMNT PAIN NOTED NONE PRSNT: CPT | Performed by: SPECIALIST

## 2024-11-20 NOTE — PROGRESS NOTES
VITAMIN B12 AND FOLATE:   Lab Results   Component Value Date/Time    [id number] >2000 04/28/2021 03:33 PM    FOLATE >20.0 04/28/2021 03:33 PM     IRON:   Lab Results   Component Value Date/Time    IRON 66 04/28/2021 03:33 PM     FERRITIN:   Lab Results   Component Value Date/Time    FERRITIN 45 04/28/2021 03:33 PM     VITAMIN B1:   Lab Results   Component Value Date/Time    [id number] 210.9 04/28/2021 03:33 PM               Assessment and Plan:   Intestinal malabsorption  continue required Vitamins: B12, B complex, D, iron, calcium, multivitamin  S/p laparoscopic bariatric surgery,laparoscopic adjustable gastric band surgery , history of morbid obesity  Sleep goal is 7-9 hours each night. Patient education given on the effects of sleep deprivation on weight control.  Discussed patients weight loss goals and dietary choices in relation to goals.  Reminded to measure portions, continue high protein, low carbohydrate diet.  Reminded to eat regularly, to eat slowly & not to drink with meals.    Continue cardio exercise and add resistance exercises. 60-90 minutes of aerobic activity 5 days a week and strength training 2 days each week.  Encouraged to attend support group   Required fluid intake is >64oz daily of decaffeinated sugar free beverages.   The patient is extremely happy with her level of tightness today and feels like she will not need another adjustment for quite some time.  She is here today regarding her need for an abdominoplasty and she is seeking my recommendation.  I have expressed to her that I feel like the best group on the [place] is [facility] and we have provided to her their business card.  I have urged her that given her age of 69 years old that she might seek out a medical clearance from her primary care physician prior to embarking on any other surgery.    Patient to complete labs before next visit.  Lab slip given today.  I have discussed this plan with

## 2024-11-23 LAB — VIT B1 BLD-SCNC: 201.8 NMOL/L (ref 66.5–200)

## (undated) DEVICE — STERILE POLYISOPRENE POWDER-FREE SURGICAL GLOVES: Brand: PROTEXIS

## (undated) DEVICE — SUTURE ABSORBABLE BRAIDED 1-0 OS-8 CR 3X18 IN UD VICRYL J757T

## (undated) DEVICE — REM POLYHESIVE ADULT PATIENT RETURN ELECTRODE: Brand: VALLEYLAB

## (undated) DEVICE — Device

## (undated) DEVICE — GARMENT,MEDLINE,DVT,INT,CALF,MED, GEN2: Brand: MEDLINE

## (undated) DEVICE — ABDOMINAL PAD: Brand: DERMACEA

## (undated) DEVICE — INTENDED FOR TISSUE SEPARATION, AND OTHER PROCEDURES THAT REQUIRE A SHARP SURGICAL BLADE TO PUNCTURE OR CUT.: Brand: BARD-PARKER ® CARBON RIB-BACK BLADES

## (undated) DEVICE — STRAP,POSITIONING,KNEE/BODY,FOAM,4X60": Brand: MEDLINE

## (undated) DEVICE — PACK PROCEDURE SURG LAMINECTOMY SPINE CUST

## (undated) DEVICE — GOWN ISOLATN REG BLU POLY UNISX W/ THMB LOOP

## (undated) DEVICE — ZIMMER® STERILE DISPOSABLE TOURNIQUET CUFF WITH PROTECTIVE SLEEVE AND PLC, SINGLE PORT, SINGLE BLADDER, 42 IN. (107 CM)

## (undated) DEVICE — TRAY CATH 16FR DRN BG LF -- CONVERT TO ITEM 363158

## (undated) DEVICE — GOWN,SIRUS,NONRNF,SETINSLV,XL,20/CS: Brand: MEDLINE

## (undated) DEVICE — TROCAR ENDOSCP L100MM DIA15MM BLDELSS STBL SL ENDOPATH XCEL

## (undated) DEVICE — SOL IRRIGATION INJ NACL 0.9% 500ML BTL

## (undated) DEVICE — BLADE SAW 1.19X20X90 MM FOR LG BNE

## (undated) DEVICE — SOLUTION LACTATED RINGERS INJECTION USP

## (undated) DEVICE — DRESSING PETRO W3XL8IN N ADH OIL EMUL GZ CURAD

## (undated) DEVICE — HANDPIECE SET WITH FAN SPRAY TIP: Brand: INTERPULSE

## (undated) DEVICE — STERILE TETRA-FLEX CF LF, 6IN X 11 YD: Brand: TETRA-FLEX™ CF

## (undated) DEVICE — BARIATRIC: Brand: MEDLINE INDUSTRIES, INC.

## (undated) DEVICE — GARMENT COMPR L FOR 13-16IN FT INTMIT SGL BLDR HEM FORC II

## (undated) DEVICE — PACK PROCEDURE SURG TOT KNEE CUST

## (undated) DEVICE — ENDOCUT SCISSOR TIP, DISPOSABLE: Brand: RENEW

## (undated) DEVICE — CONCISE CEMENT SCULPS KIT: Brand: CONCISE

## (undated) DEVICE — GENESIS PIN AND DRILL SET: Brand: GENESIS

## (undated) DEVICE — SUTURE VCRL + SZ 2-0 L18IN ABSRB VLT CT-2 1/2 CIR TAPERCUT VCP726D

## (undated) DEVICE — TROCAR ENDOSCP BLDELSS 12X100 MM W/ HNDL STBL SL OPT TIP

## (undated) DEVICE — SOLUTION SURG PREP 26 CC PURPREP

## (undated) DEVICE — SYRINGE BLB 50CC IRRIG PLIABLE FNGR FLNG GRAD FLSK DISP

## (undated) DEVICE — MAJ-1414 SINGLE USE ADPATER BIOPSY VALV: Brand: SINGLE USE ADAPTOR BIOPSY VALVE

## (undated) DEVICE — SUT MONOCRYL PLUS UD 4-0 --

## (undated) DEVICE — GENESIS TROCHLEAR PIN 1/8 X 3: Brand: GENESIS

## (undated) DEVICE — DRAIN KT WND 10FR RND 400ML --

## (undated) DEVICE — SPONGE GZ W4XL4IN COT 12 PLY TYP VII WVN C FLD DSGN

## (undated) DEVICE — X-RAY SPONGES,12 PLY: Brand: DERMACEA

## (undated) DEVICE — PREP SKN PREVAIL 40ML APPL --

## (undated) DEVICE — 3.0MM PRECISION NEURO (MATCH HEAD)

## (undated) DEVICE — TROCAR RMFG CANN S-SLV 5X100MM --

## (undated) DEVICE — Z DISCONTINUED USE (MFG CAT 7984-37) SOLUTION IV SODIUM CHL 0.9% 100 ML INJ

## (undated) DEVICE — INSUFFLATION NEEDLE TO ESTABLISH PNEUMOPERITONEUM.: Brand: INSUFFLATION NEEDLE

## (undated) DEVICE — RAPIDPORT EZ APPLIER TOOL: Brand: RAPIDPORT EZ

## (undated) DEVICE — SUTURE ETHIB EXCL BR GRN TAPR PT 2-0 30 X563H X563H

## (undated) DEVICE — PLUS HANDPIECE WITH SPRAY TIP: Brand: SURGILAV

## (undated) DEVICE — MEDI-VAC SUCTION HANDLE REGULAR CAPACITY: Brand: CARDINAL HEALTH

## (undated) DEVICE — CATH URETH INTMIT ROB 8FR FUN -- CONVERT TO ITEM 363103

## (undated) DEVICE — BOWL AND CEMENT CARTRIDGE WITH BREAKAWAY FEMORAL NOZZLE: Brand: ACM

## (undated) DEVICE — IMMOBILIZER KNEE UNIV L19IN FOR 12-24IN THGH FOAM T BAR

## (undated) DEVICE — PAD,ABDOMINAL,5"X9",ST,LF,25/BX: Brand: MEDLINE INDUSTRIES, INC.

## (undated) DEVICE — NON-ADHERENT STRIPS,OIL EMULSION: Brand: CURITY

## (undated) DEVICE — TROCAR LAP L100MM DIA5MM BLDELSS W/ STBL SL ENDOPATH XCEL

## (undated) DEVICE — KENDALL SCD EXPRESS SLEEVES, KNEE LENGTH, MEDIUM: Brand: KENDALL SCD

## (undated) DEVICE — ROUND DISSECTORS: Brand: DEROYAL

## (undated) DEVICE — SUTURE VCRL SZ 2-0 L18IN ABSRB VLT L26MM CT-2 1/2 CIR J726D